# Patient Record
Sex: FEMALE | Race: WHITE | Employment: OTHER | URBAN - METROPOLITAN AREA
[De-identification: names, ages, dates, MRNs, and addresses within clinical notes are randomized per-mention and may not be internally consistent; named-entity substitution may affect disease eponyms.]

---

## 2018-04-09 ENCOUNTER — HOSPITAL ENCOUNTER (OUTPATIENT)
Age: 73
Discharge: HOME | End: 2018-04-11
Payer: MEDICARE

## 2018-04-09 ENCOUNTER — HOSPITAL ENCOUNTER (OUTPATIENT)
Dept: GENERAL RADIOLOGY | Age: 73
Discharge: HOME | End: 2018-04-11
Payer: MEDICARE

## 2018-04-09 DIAGNOSIS — M19.011 OSTEOARTHRITIS OF RIGHT SHOULDER, UNSPECIFIED OSTEOARTHRITIS TYPE: ICD-10-CM

## 2018-04-09 PROCEDURE — 73030 X-RAY EXAM OF SHOULDER: CPT

## 2018-11-14 ENCOUNTER — HOSPITAL ENCOUNTER (OUTPATIENT)
Age: 73
Discharge: HOME | End: 2018-11-16
Payer: MEDICARE

## 2018-11-14 PROCEDURE — 88304 TISSUE EXAM BY PATHOLOGIST: CPT

## 2019-10-31 ENCOUNTER — PREP FOR PROCEDURE (OUTPATIENT)
Dept: SURGERY | Age: 74
End: 2019-10-31

## 2019-10-31 ENCOUNTER — OFFICE VISIT (OUTPATIENT)
Dept: SURGERY | Age: 74
End: 2019-10-31
Payer: MEDICARE

## 2019-10-31 VITALS
OXYGEN SATURATION: 96 % | DIASTOLIC BLOOD PRESSURE: 68 MMHG | TEMPERATURE: 98.7 F | WEIGHT: 117 LBS | RESPIRATION RATE: 18 BRPM | BODY MASS INDEX: 22.97 KG/M2 | HEART RATE: 65 BPM | HEIGHT: 60 IN | SYSTOLIC BLOOD PRESSURE: 135 MMHG

## 2019-10-31 DIAGNOSIS — K59.02 CONSTIPATION DUE TO OUTLET DYSFUNCTION: Primary | ICD-10-CM

## 2019-10-31 PROCEDURE — 99203 OFFICE O/P NEW LOW 30 MIN: CPT | Performed by: SURGERY

## 2019-10-31 RX ORDER — SODIUM CHLORIDE 0.9 % (FLUSH) 0.9 %
10 SYRINGE (ML) INJECTION PRN
Status: CANCELLED | OUTPATIENT
Start: 2019-10-31

## 2019-10-31 RX ORDER — SODIUM CHLORIDE 0.9 % (FLUSH) 0.9 %
10 SYRINGE (ML) INJECTION EVERY 12 HOURS SCHEDULED
Status: CANCELLED | OUTPATIENT
Start: 2019-10-31

## 2019-10-31 RX ORDER — SODIUM CHLORIDE 9 MG/ML
INJECTION, SOLUTION INTRAVENOUS CONTINUOUS
Status: CANCELLED | OUTPATIENT
Start: 2019-10-31

## 2019-11-13 ENCOUNTER — HOSPITAL ENCOUNTER (OUTPATIENT)
Age: 74
Discharge: HOME | End: 2019-11-15

## 2019-11-13 PROCEDURE — 88305 TISSUE EXAM BY PATHOLOGIST: CPT

## 2019-11-27 RX ORDER — ACETAMINOPHEN 325 MG/1
650 TABLET ORAL EVERY 6 HOURS PRN
COMMUNITY

## 2019-12-04 ENCOUNTER — ANESTHESIA EVENT (OUTPATIENT)
Dept: ENDOSCOPY | Age: 74
End: 2019-12-04
Payer: MEDICARE

## 2019-12-04 ENCOUNTER — ANESTHESIA (OUTPATIENT)
Dept: ENDOSCOPY | Age: 74
End: 2019-12-04
Payer: MEDICARE

## 2019-12-04 ENCOUNTER — HOSPITAL ENCOUNTER (OUTPATIENT)
Age: 74
Setting detail: OUTPATIENT SURGERY
Discharge: HOME | End: 2019-12-04
Attending: SURGERY | Admitting: SURGERY
Payer: MEDICARE

## 2019-12-04 VITALS
OXYGEN SATURATION: 100 % | SYSTOLIC BLOOD PRESSURE: 168 MMHG | RESPIRATION RATE: 18 BRPM | DIASTOLIC BLOOD PRESSURE: 77 MMHG

## 2019-12-04 VITALS
RESPIRATION RATE: 16 BRPM | DIASTOLIC BLOOD PRESSURE: 88 MMHG | HEIGHT: 60 IN | WEIGHT: 112 LBS | HEART RATE: 61 BPM | OXYGEN SATURATION: 95 % | TEMPERATURE: 97.2 F | BODY MASS INDEX: 21.99 KG/M2 | SYSTOLIC BLOOD PRESSURE: 160 MMHG

## 2019-12-04 PROCEDURE — 7100000010 HC PHASE II RECOVERY - FIRST 15 MIN: Performed by: SURGERY

## 2019-12-04 PROCEDURE — 2580000003 HC RX 258: Performed by: SURGERY

## 2019-12-04 PROCEDURE — 3700000001 HC ADD 15 MINUTES (ANESTHESIA): Performed by: SURGERY

## 2019-12-04 PROCEDURE — 2500000003 HC RX 250 WO HCPCS: Performed by: NURSE ANESTHETIST, CERTIFIED REGISTERED

## 2019-12-04 PROCEDURE — 88305 TISSUE EXAM BY PATHOLOGIST: CPT

## 2019-12-04 PROCEDURE — 2709999900 HC NON-CHARGEABLE SUPPLY: Performed by: SURGERY

## 2019-12-04 PROCEDURE — 3700000000 HC ANESTHESIA ATTENDED CARE: Performed by: SURGERY

## 2019-12-04 PROCEDURE — 3609010300 HC COLONOSCOPY W/BIOPSY SINGLE/MULTIPLE: Performed by: SURGERY

## 2019-12-04 PROCEDURE — 7100000011 HC PHASE II RECOVERY - ADDTL 15 MIN: Performed by: SURGERY

## 2019-12-04 PROCEDURE — 6360000002 HC RX W HCPCS: Performed by: NURSE ANESTHETIST, CERTIFIED REGISTERED

## 2019-12-04 PROCEDURE — 45380 COLONOSCOPY AND BIOPSY: CPT | Performed by: SURGERY

## 2019-12-04 RX ORDER — PROPOFOL 10 MG/ML
INJECTION, EMULSION INTRAVENOUS PRN
Status: DISCONTINUED | OUTPATIENT
Start: 2019-12-04 | End: 2019-12-04 | Stop reason: SDUPTHER

## 2019-12-04 RX ORDER — LIDOCAINE HYDROCHLORIDE 10 MG/ML
INJECTION, SOLUTION EPIDURAL; INFILTRATION; INTRACAUDAL; PERINEURAL PRN
Status: DISCONTINUED | OUTPATIENT
Start: 2019-12-04 | End: 2019-12-04 | Stop reason: SDUPTHER

## 2019-12-04 RX ORDER — SODIUM CHLORIDE 9 MG/ML
INJECTION, SOLUTION INTRAVENOUS CONTINUOUS
Status: DISCONTINUED | OUTPATIENT
Start: 2019-12-04 | End: 2019-12-04 | Stop reason: HOSPADM

## 2019-12-04 RX ORDER — SODIUM CHLORIDE 0.9 % (FLUSH) 0.9 %
10 SYRINGE (ML) INJECTION EVERY 12 HOURS SCHEDULED
Status: DISCONTINUED | OUTPATIENT
Start: 2019-12-04 | End: 2019-12-04 | Stop reason: HOSPADM

## 2019-12-04 RX ORDER — SODIUM CHLORIDE 0.9 % (FLUSH) 0.9 %
10 SYRINGE (ML) INJECTION PRN
Status: DISCONTINUED | OUTPATIENT
Start: 2019-12-04 | End: 2019-12-04 | Stop reason: HOSPADM

## 2019-12-04 RX ADMIN — PROPOFOL 30 MG: 10 INJECTION, EMULSION INTRAVENOUS at 09:14

## 2019-12-04 RX ADMIN — PROPOFOL 50 MG: 10 INJECTION, EMULSION INTRAVENOUS at 09:35

## 2019-12-04 RX ADMIN — PROPOFOL 100 MG: 10 INJECTION, EMULSION INTRAVENOUS at 09:12

## 2019-12-04 RX ADMIN — LIDOCAINE HYDROCHLORIDE 20 MG: 10 INJECTION, SOLUTION EPIDURAL; INFILTRATION; INTRACAUDAL; PERINEURAL at 09:12

## 2019-12-04 RX ADMIN — PROPOFOL 50 MG: 10 INJECTION, EMULSION INTRAVENOUS at 09:23

## 2019-12-04 RX ADMIN — PROPOFOL 20 MG: 10 INJECTION, EMULSION INTRAVENOUS at 09:21

## 2019-12-04 RX ADMIN — SODIUM CHLORIDE: 9 INJECTION, SOLUTION INTRAVENOUS at 08:26

## 2019-12-04 RX ADMIN — PROPOFOL 20 MG: 10 INJECTION, EMULSION INTRAVENOUS at 09:16

## 2019-12-04 ASSESSMENT — PAIN SCALES - GENERAL
PAINLEVEL_OUTOF10: 0

## 2019-12-04 ASSESSMENT — PAIN DESCRIPTION - LOCATION
LOCATION: ABDOMEN

## 2019-12-04 ASSESSMENT — PAIN - FUNCTIONAL ASSESSMENT: PAIN_FUNCTIONAL_ASSESSMENT: 0-10

## 2019-12-10 ENCOUNTER — TELEPHONE (OUTPATIENT)
Dept: SURGERY | Age: 74
End: 2019-12-10

## 2020-02-07 RX ORDER — LANOLIN ALCOHOL/MO/W.PET/CERES
3 CREAM (GRAM) TOPICAL NIGHTLY PRN
COMMUNITY
End: 2021-04-13

## 2020-02-07 RX ORDER — DOCUSATE SODIUM 100 MG/1
100 CAPSULE, LIQUID FILLED ORAL 2 TIMES DAILY PRN
COMMUNITY
End: 2021-10-27 | Stop reason: ALTCHOICE

## 2020-02-10 ENCOUNTER — ANESTHESIA EVENT (OUTPATIENT)
Dept: OPERATING ROOM | Age: 75
End: 2020-02-10
Payer: MEDICARE

## 2020-02-10 ENCOUNTER — PREP FOR PROCEDURE (OUTPATIENT)
Dept: SURGERY | Age: 75
End: 2020-02-10

## 2020-02-10 RX ORDER — SODIUM CHLORIDE 0.9 % (FLUSH) 0.9 %
10 SYRINGE (ML) INJECTION EVERY 12 HOURS SCHEDULED
Status: CANCELLED | OUTPATIENT
Start: 2020-02-10

## 2020-02-10 RX ORDER — SODIUM CHLORIDE, SODIUM LACTATE, POTASSIUM CHLORIDE, CALCIUM CHLORIDE 600; 310; 30; 20 MG/100ML; MG/100ML; MG/100ML; MG/100ML
INJECTION, SOLUTION INTRAVENOUS CONTINUOUS
Status: CANCELLED | OUTPATIENT
Start: 2020-02-10

## 2020-02-11 ENCOUNTER — ANESTHESIA (OUTPATIENT)
Dept: OPERATING ROOM | Age: 75
End: 2020-02-11
Payer: MEDICARE

## 2020-02-11 ENCOUNTER — HOSPITAL ENCOUNTER (OUTPATIENT)
Dept: GENERAL RADIOLOGY | Age: 75
Setting detail: OUTPATIENT SURGERY
Discharge: HOME | End: 2020-02-13
Attending: SURGERY
Payer: MEDICARE

## 2020-02-11 ENCOUNTER — APPOINTMENT (OUTPATIENT)
Dept: GENERAL RADIOLOGY | Age: 75
End: 2020-02-11
Attending: SURGERY
Payer: MEDICARE

## 2020-02-11 ENCOUNTER — HOSPITAL ENCOUNTER (OUTPATIENT)
Age: 75
Setting detail: OUTPATIENT SURGERY
Discharge: HOME | End: 2020-02-11
Attending: SURGERY | Admitting: SURGERY
Payer: MEDICARE

## 2020-02-11 VITALS
RESPIRATION RATE: 16 BRPM | HEART RATE: 70 BPM | OXYGEN SATURATION: 95 % | DIASTOLIC BLOOD PRESSURE: 61 MMHG | WEIGHT: 113 LBS | BODY MASS INDEX: 22.78 KG/M2 | HEIGHT: 59 IN | TEMPERATURE: 96.6 F | SYSTOLIC BLOOD PRESSURE: 134 MMHG

## 2020-02-11 VITALS — DIASTOLIC BLOOD PRESSURE: 52 MMHG | OXYGEN SATURATION: 98 % | SYSTOLIC BLOOD PRESSURE: 111 MMHG

## 2020-02-11 PROCEDURE — 6360000002 HC RX W HCPCS: Performed by: SURGERY

## 2020-02-11 PROCEDURE — 71045 X-RAY EXAM CHEST 1 VIEW: CPT

## 2020-02-11 PROCEDURE — 7100000010 HC PHASE II RECOVERY - FIRST 15 MIN: Performed by: SURGERY

## 2020-02-11 PROCEDURE — 6360000002 HC RX W HCPCS

## 2020-02-11 PROCEDURE — 2709999900 HC NON-CHARGEABLE SUPPLY: Performed by: SURGERY

## 2020-02-11 PROCEDURE — 3700000000 HC ANESTHESIA ATTENDED CARE: Performed by: SURGERY

## 2020-02-11 PROCEDURE — 3600000002 HC SURGERY LEVEL 2 BASE: Performed by: SURGERY

## 2020-02-11 PROCEDURE — 2580000003 HC RX 258: Performed by: SURGERY

## 2020-02-11 PROCEDURE — 3600000012 HC SURGERY LEVEL 2 ADDTL 15MIN: Performed by: SURGERY

## 2020-02-11 PROCEDURE — C1788 PORT, INDWELLING, IMP: HCPCS | Performed by: SURGERY

## 2020-02-11 PROCEDURE — 3700000001 HC ADD 15 MINUTES (ANESTHESIA): Performed by: SURGERY

## 2020-02-11 PROCEDURE — 7100000011 HC PHASE II RECOVERY - ADDTL 15 MIN: Performed by: SURGERY

## 2020-02-11 PROCEDURE — 3209999900 FLUORO FOR SURGICAL PROCEDURES

## 2020-02-11 PROCEDURE — 2500000003 HC RX 250 WO HCPCS: Performed by: SURGERY

## 2020-02-11 DEVICE — PORT INFUS 8FR PWR INJ CT FOR VASC ACCS CATH: Type: IMPLANTABLE DEVICE | Site: CHEST | Status: FUNCTIONAL

## 2020-02-11 RX ORDER — PROPOFOL 10 MG/ML
INJECTION, EMULSION INTRAVENOUS CONTINUOUS PRN
Status: DISCONTINUED | OUTPATIENT
Start: 2020-02-11 | End: 2020-02-11 | Stop reason: SDUPTHER

## 2020-02-11 RX ORDER — SODIUM CHLORIDE 9 MG/ML
INJECTION INTRAVENOUS PRN
Status: DISCONTINUED | OUTPATIENT
Start: 2020-02-11 | End: 2020-02-11 | Stop reason: ALTCHOICE

## 2020-02-11 RX ORDER — CEFAZOLIN SODIUM 2 G/50ML
2 SOLUTION INTRAVENOUS
Status: COMPLETED | OUTPATIENT
Start: 2020-02-11 | End: 2020-02-11

## 2020-02-11 RX ORDER — FENTANYL CITRATE 50 UG/ML
50 INJECTION, SOLUTION INTRAMUSCULAR; INTRAVENOUS EVERY 5 MIN PRN
Status: DISCONTINUED | OUTPATIENT
Start: 2020-02-11 | End: 2020-02-11 | Stop reason: HOSPADM

## 2020-02-11 RX ORDER — OXYCODONE HYDROCHLORIDE AND ACETAMINOPHEN 5; 325 MG/1; MG/1
1 TABLET ORAL
Status: DISCONTINUED | OUTPATIENT
Start: 2020-02-11 | End: 2020-02-11 | Stop reason: HOSPADM

## 2020-02-11 RX ORDER — FENTANYL CITRATE 50 UG/ML
INJECTION, SOLUTION INTRAMUSCULAR; INTRAVENOUS PRN
Status: DISCONTINUED | OUTPATIENT
Start: 2020-02-11 | End: 2020-02-11 | Stop reason: SDUPTHER

## 2020-02-11 RX ORDER — FENTANYL CITRATE 50 UG/ML
25 INJECTION, SOLUTION INTRAMUSCULAR; INTRAVENOUS EVERY 5 MIN PRN
Status: DISCONTINUED | OUTPATIENT
Start: 2020-02-11 | End: 2020-02-11 | Stop reason: HOSPADM

## 2020-02-11 RX ORDER — MEPERIDINE HYDROCHLORIDE 25 MG/ML
12.5 INJECTION INTRAMUSCULAR; INTRAVENOUS; SUBCUTANEOUS EVERY 5 MIN PRN
Status: DISCONTINUED | OUTPATIENT
Start: 2020-02-11 | End: 2020-02-11 | Stop reason: HOSPADM

## 2020-02-11 RX ORDER — HYDROCODONE BITARTRATE AND ACETAMINOPHEN 5; 325 MG/1; MG/1
1 TABLET ORAL EVERY 4 HOURS PRN
Qty: 18 TABLET | Refills: 0 | Status: SHIPPED | OUTPATIENT
Start: 2020-02-11 | End: 2020-02-14

## 2020-02-11 RX ORDER — DIPHENHYDRAMINE HYDROCHLORIDE 50 MG/ML
12.5 INJECTION INTRAMUSCULAR; INTRAVENOUS
Status: DISCONTINUED | OUTPATIENT
Start: 2020-02-11 | End: 2020-02-11 | Stop reason: HOSPADM

## 2020-02-11 RX ORDER — ONDANSETRON 2 MG/ML
INJECTION INTRAMUSCULAR; INTRAVENOUS PRN
Status: DISCONTINUED | OUTPATIENT
Start: 2020-02-11 | End: 2020-02-11 | Stop reason: SDUPTHER

## 2020-02-11 RX ORDER — MIDAZOLAM HYDROCHLORIDE 1 MG/ML
INJECTION INTRAMUSCULAR; INTRAVENOUS PRN
Status: DISCONTINUED | OUTPATIENT
Start: 2020-02-11 | End: 2020-02-11 | Stop reason: SDUPTHER

## 2020-02-11 RX ORDER — HEPARIN SODIUM (PORCINE) LOCK FLUSH IV SOLN 100 UNIT/ML 100 UNIT/ML
SOLUTION INTRAVENOUS PRN
Status: DISCONTINUED | OUTPATIENT
Start: 2020-02-11 | End: 2020-02-11 | Stop reason: ALTCHOICE

## 2020-02-11 RX ORDER — SODIUM CHLORIDE 0.9 % (FLUSH) 0.9 %
10 SYRINGE (ML) INJECTION EVERY 12 HOURS SCHEDULED
Status: DISCONTINUED | OUTPATIENT
Start: 2020-02-11 | End: 2020-02-11 | Stop reason: HOSPADM

## 2020-02-11 RX ORDER — LIDOCAINE HYDROCHLORIDE 10 MG/ML
INJECTION, SOLUTION INFILTRATION; PERINEURAL PRN
Status: DISCONTINUED | OUTPATIENT
Start: 2020-02-11 | End: 2020-02-11 | Stop reason: ALTCHOICE

## 2020-02-11 RX ORDER — PROMETHAZINE HYDROCHLORIDE 25 MG/ML
6.25 INJECTION, SOLUTION INTRAMUSCULAR; INTRAVENOUS
Status: DISCONTINUED | OUTPATIENT
Start: 2020-02-11 | End: 2020-02-11 | Stop reason: HOSPADM

## 2020-02-11 RX ORDER — SODIUM CHLORIDE, SODIUM LACTATE, POTASSIUM CHLORIDE, CALCIUM CHLORIDE 600; 310; 30; 20 MG/100ML; MG/100ML; MG/100ML; MG/100ML
INJECTION, SOLUTION INTRAVENOUS CONTINUOUS
Status: DISCONTINUED | OUTPATIENT
Start: 2020-02-11 | End: 2020-02-11 | Stop reason: HOSPADM

## 2020-02-11 RX ADMIN — PROPOFOL 75 MCG/KG/MIN: 10 INJECTION, EMULSION INTRAVENOUS at 10:22

## 2020-02-11 RX ADMIN — FENTANYL CITRATE 50 MCG: 50 INJECTION, SOLUTION INTRAMUSCULAR; INTRAVENOUS at 10:19

## 2020-02-11 RX ADMIN — FENTANYL CITRATE 25 MCG: 50 INJECTION, SOLUTION INTRAMUSCULAR; INTRAVENOUS at 10:36

## 2020-02-11 RX ADMIN — FENTANYL CITRATE 25 MCG: 50 INJECTION, SOLUTION INTRAMUSCULAR; INTRAVENOUS at 10:30

## 2020-02-11 RX ADMIN — CEFAZOLIN SODIUM 2 G: 2 SOLUTION INTRAVENOUS at 10:17

## 2020-02-11 RX ADMIN — ONDANSETRON HYDROCHLORIDE 4 MG: 2 INJECTION, SOLUTION INTRAMUSCULAR; INTRAVENOUS at 10:40

## 2020-02-11 RX ADMIN — MIDAZOLAM 2 MG: 1 INJECTION INTRAMUSCULAR; INTRAVENOUS at 10:17

## 2020-02-11 RX ADMIN — PROPOFOL 50 MCG/KG/MIN: 10 INJECTION, EMULSION INTRAVENOUS at 10:19

## 2020-02-11 RX ADMIN — SODIUM CHLORIDE, POTASSIUM CHLORIDE, SODIUM LACTATE AND CALCIUM CHLORIDE: 600; 310; 30; 20 INJECTION, SOLUTION INTRAVENOUS at 10:18

## 2020-02-11 ASSESSMENT — PAIN SCALES - GENERAL
PAINLEVEL_OUTOF10: 0

## 2020-02-11 NOTE — PROGRESS NOTES
Explained discharge paperwork to patient and family members. All verbalized that they understood. Took out patient IV before being discharged.
registration    [x] You can expect a call the business day prior to procedure to notify you if your arrival time changes    [x] If you receive a survey after surgery we would greatly appreciate your comments    [] Parent/guardian of a minor must accompany their child and remain on the premises  the entire time they are under our care     [] Pediatric patients may bring favorite toy, blanket or comfort item with them    [] A caregiver or family member must remain with the patient during their stay if they are mentally handicapped, have dementia, disoriented or unable to use a call light or would be a safety concern if left unattended    [x] Please notify surgeon if you develop any illness between now and time of surgery (cold, cough, sore throat, fever, nausea, vomiting) or any signs of infections  including skin, wounds, and dental.    [x]  The Outpatient Pharmacy is available to fill your prescription here on your day of surgery, ask your preop nurse for details    [x] Other instructions: Wear comfortable clothing    EDUCATIONAL MATERIALS PROVIDED:    [] PAT Preoperative Education Packet/Booklet     [] Medication List    [] Fluoroscopy Information Pamphlet    [] Transfusion bracelet applied with instructions    [] Joint replacement video reviewed    [] Shower with soap, lather and rinse well, and use CHG wipes provided the evening before surgery as instructed

## 2020-02-11 NOTE — H&P
History and Physical    Patient's Name/Date of Birth: Janeen Solano / [birth year], 76 y.o. yo    Date: February 11, 2020     PCP: Jerald Valdez DO     Chief Complaint: endometrial cancer      History of Present Illness: 76year old female, right hand dominant. Recent surgery for serous carcinoma of uterus. Due to start chemotherapy and radiation. Past Medical History:   Diagnosis Date    Babesiosis 2009    tick disease; treated    Endometrial cancer ([address] 11/21 2019 approx    new dx    Hyperlipidemia     Sleep apnea       Past Surgical History:   Procedure Laterality Date    BUNIONECTOMY  2006 2007    bilateral    CARPAL TUNNEL RELEASE Bilateral     COLONOSCOPY      COLONOSCOPY N/A 12/4/2019    COLONOSCOPY WITH [facility]OPSY [facility]  2010    right hand    FINGER TRIGGER RELEASE Left 12/18/2018    left ring finger.  [facility]    OTHER SURGICAL HISTORY Right 7/3/15    excision mass right upper arm    TONSILLECTOMY  1947    TUBAL LIGATION  1980      Family History   Problem Relation Age of Onset    Other Mother     Other Father      Allergies: Azmacort [triamcinolone];  Doxycycline; Mepron [atovaquone]; and Zithromax [azithromycin]     Current Facility-Administered Medications   Medication Dose Route Frequency Provider Last Rate Last Dose    ceFAZolin (ANCEF) 2 g in dextrose 3 % 50 mL IVPB (duplex)  2 g Intravenous On Call to [address] [place] [address] MD Ivonne        lactated ringers infusion   Intravenous Continuous Theodor MD Mark        sodium chloride flush 0.9 % injection 10 mL  10 mL Intravenous 2 times per day Theodor MD Mark        fentaNYL (SUBLIMAZE) injection 25 mcg  25 mcg Intravenous Q5 Min PRN Sami Yanez MD        fentaNYL (SUBLIMAZE) injection 50 mcg  50 mcg Intravenous Q5 Min PRN Sami Yanez MD        HYDROmorphone (DILAUDID) injection 0.25 mg  0.25 mg Intravenous Q5 Min PRN MD Soy Bonilla HYDROmorphone (DILAUDID) injection 0.5 mg  0.5 mg Intravenous Q5 Min PRN Sami Yanez MD        oxyCODONE-acetaminophen (PERCOCET) 5-325 MG per tablet 1 tablet  1 tablet Oral Once PRN Matteo Pearce MD        diphenhydrAMINE (BENADRYL) injection 12.5 mg  12.5 mg Intravenous Once PRN Sami Yanez MD        promethazine (PHENERGAN) injection 6.25 mg  6.25 mg Intravenous Q15 Min PRN Sami Yanez MD        meperidine (DEMEROL) injection 12.5 mg  12.5 mg Intravenous Q5 Min PRN Matteo Pearce MD           Social History     Tobacco Use    Smoking status: Never Smoker    Smokeless tobacco: Never Used   Substance Use Topics    Alcohol use: Yes     Alcohol/week: 0.0 standard drinks     Comment: rarely        Review of Systems:    Other than stated above in the HPI is negative      Physical exam:     Patient Vitals for the past 24 hrs:   BP Temp Temp src Pulse Resp SpO2 Height Weight   02/11/20 0758 (!) 171/75 98.5 °F (36.9 °C) Temporal 67 18 98 % 4' 11\" (1.499 m) 113 lb (51.3 kg)       General appearance: no acute distress  Head: NCAT, PERRLA, EOMI  Neck: supple, no masses  Lungs: CTABL  Heart: RRR  Abdomen: soft, nondistended, nontender, no guarding or peritoneal signs. Extremities: no rash cyanosis edema or jaundice    Labs:    No results for input(s): WBC, HGB, HCT, PLT in the last 72 hours. No results for input(s): CREATININE, BUN, NA, K, CL, CO2 in the last 72 hours. No results for input(s): AST, ALT, ALB, BILITOT, ALKPHOS in the last 72 hours. No results for input(s): LIPASE, AMYLASE in the last 72 hours. No results for input(s): LACTATE in the last 72 hours. No results for input(s): INR, PTT in the last 72 hours. Invalid input(s): PT    Films:  No results found.         Assessment:  Endometrial cancer      Plan:  Port placement        Ozzy Baumann MD 2/11/2020 at 9:42 AM

## 2020-02-11 NOTE — ANESTHESIA PRE PROCEDURE
Department of Anesthesiology  Preprocedure Note       Name:  Antonietta Hammond   Age:  76 y.o.  :  1945                                          MRN:  [id number]         Date:  2020      Surgeon: Alfredo Sparks):  Julia Olivo MD    Procedure: INSERTION OF A POWER PORT (N/A Chest)    Medications prior to admission:   Prior to Admission medications    Medication Sig Start Date End Date Taking? Authorizing Provider   melatonin 3 MG TABS tablet Take 3 mg by mouth nightly as needed Last dose 20   Yes Historical Provider, MD   docusate sodium (COLACE) 100 MG capsule Take 100 mg by mouth 2 times daily as needed for Constipation   Yes Historical Provider, MD   acetaminophen (TYLENOL) 325 MG tablet Take 650 mg by mouth every 6 hours as needed for Pain   Yes Historical Provider, MD   vitamin D-3 (CHOLECALCIFEROL) 5000 UNITS TABS Take 5,000 Units by mouth daily Last dose 20   Yes Historical Provider, MD   Magnesium 500 MG CAPS Take by mouth every evening Last dose 20   Yes Historical Provider, MD   Cetirizine HCl (ZYRTEC PO) Take 1 tablet by mouth daily    Yes Historical Provider, MD   aspirin 325 MG tablet Take 325 mg by mouth daily Per PCP; last dose 20   Yes Historical Provider, MD       Current medications:    Current Facility-Administered Medications   Medication Dose Route Frequency Provider Last Rate Last Dose    ceFAZolin (ANCEF) 2 g in dextrose 3 % 50 mL IVPB (duplex)  2 g Intravenous On Call to Malik Washington MD        lactated ringers infusion   Intravenous Continuous Julia Olivo MD        sodium chloride flush 0.9 % injection 10 mL  10 mL Intravenous 2 times per day Julia Olivo MD           Allergies:     Allergies   Allergen Reactions    Azmacort [Triamcinolone] Swelling    Doxycycline Rash    Mepron [Atovaquone] Rash    Zithromax [Azithromycin] Rash       Problem List:    Patient Active Problem List   Diagnosis Code    GENNY on CPAP G47.33, Z99.89       Past Medical History: Diagnosis Date    Babesiosis 2009    tick disease; treated    Endometrial cancer ([facility] [address] 11/21 2019 approx    new dx    Hyperlipidemia     Sleep apnea        Past Surgical History:        Procedure Laterality Date    BUNIONECTOMY  2006 2007    bilateral    CARPAL TUNNEL RELEASE Bilateral     COLONOSCOPY      COLONOSCOPY N/A 12/4/2019    COLONOSCOPY WITH BIOPSY performed by Leandro Domingo MD at [facility]  2010    right hand    FINGER TRIGGER RELEASE Left 12/18/2018    left ring finger.  [facility]  [facility]17/2019    [facility]    OTHER SURGICAL HISTORY Right 7/3/15    excision mass right upper arm    TONSILLECTOMY  1947    TUBAL LIGATION  1980       Social History:    Social History     Tobacco Use    Smoking status: Never Smoker    Smokeless tobacco: Never Used   Substance Use Topics    Alcohol use: Yes     Alcohol/week: 0.0 standard drinks     Comment: rarely                                Counseling given: Not Answered      Vital Signs (Current):   Vitals:    02/07/20 1244 02/11/20 0758   BP:  (!) 171/75   Pulse:  67   Resp:  18   Temp:  98.5 °F (36.9 °C)   TempSrc:  Temporal   SpO2:  98%   Weight: 113 lb (51.3 kg) 113 lb (51.3 kg)   Height: 4' 11\" (1.499 m) 4' 11\" (1.499 m)                                              BP Readings from Last 3 Encounters:   02/11/20 (!) 171/75   12/04/19 (!) 160/88   12/04/19 (!) 168/77       NPO Status: Time of last liquid consumption: 2100                        Time of last solid consumption: 2300                        Date of last liquid consumption: 02/10/20                        Date of last solid food consumption: 02/10/20    BMI:   Wt Readings from Last 3 Encounters:   02/11/20 113 lb (51.3 kg)   11/27/19 112 lb (50.8 kg)   10/31/19 117 lb (53.1 kg)     Body mass index is 22.82 kg/m².     CBC:   Lab Results   Component Value Date    WBC 5.6 06/20/2013    RBC 3.90 06/20/2013    HGB 12.3 06/20/2013    HCT 36.9 06/20/2013    MCV 94.6

## 2020-02-11 NOTE — ANESTHESIA POSTPROCEDURE EVALUATION
Department of Anesthesiology  Postprocedure Note    Patient: Jere Barnes  MRN: [id number]  YOB: 1945  Date of evaluation: 2/11/2020  Time:  6:42 PM     Procedure Summary     Date:  02/11/20 Room / Location:  [address]    Anesthesia Start:  1018 Anesthesia Stop:  3739    Procedure:  INSERTION OF A POWER PORT (N/A Chest) Diagnosis:  (ENDOMETRIAL CANCER)    Surgeon:  General Neha MD Responsible Provider:  Waldo Coffey MD    Anesthesia Type:  MAC ASA Status:  3          Anesthesia Type: MAC    Orlin Phase I: Orlin Score: 10    Orlin Phase II: Orlin Score: 10    Last vitals: Reviewed and per EMR flowsheets.        Anesthesia Post Evaluation    Patient location during evaluation: PACU  Patient participation: complete - patient participated  Level of consciousness: awake  Airway patency: patent  Nausea & Vomiting: no vomiting and no nausea  Complications: no  Cardiovascular status: hemodynamically stable  Respiratory status: acceptable  Hydration status: stable

## 2020-10-26 ENCOUNTER — HOSPITAL ENCOUNTER (EMERGENCY)
Age: 75
Discharge: HOME | End: 2020-10-26
Attending: EMERGENCY MEDICINE
Payer: MEDICARE

## 2020-10-26 ENCOUNTER — APPOINTMENT (OUTPATIENT)
Dept: GENERAL RADIOLOGY | Age: 75
End: 2020-10-26
Payer: MEDICARE

## 2020-10-26 ENCOUNTER — APPOINTMENT (OUTPATIENT)
Dept: ULTRASOUND IMAGING | Age: 75
End: 2020-10-26
Payer: MEDICARE

## 2020-10-26 VITALS
TEMPERATURE: 98.1 F | SYSTOLIC BLOOD PRESSURE: 184 MMHG | WEIGHT: 115 LBS | BODY MASS INDEX: 22.58 KG/M2 | HEIGHT: 60 IN | OXYGEN SATURATION: 98 % | DIASTOLIC BLOOD PRESSURE: 81 MMHG | RESPIRATION RATE: 18 BRPM | HEART RATE: 70 BPM

## 2020-10-26 PROCEDURE — 99284 EMERGENCY DEPT VISIT MOD MDM: CPT

## 2020-10-26 PROCEDURE — 73564 X-RAY EXAM KNEE 4 OR MORE: CPT

## 2020-10-26 PROCEDURE — 6370000000 HC RX 637 (ALT 250 FOR IP): Performed by: EMERGENCY MEDICINE

## 2020-10-26 PROCEDURE — 93971 EXTREMITY STUDY: CPT

## 2020-10-26 RX ORDER — NAPROXEN 500 MG/1
500 TABLET ORAL 2 TIMES DAILY
Qty: 14 TABLET | Refills: 0 | Status: SHIPPED | OUTPATIENT
Start: 2020-10-26 | End: 2021-10-27 | Stop reason: ALTCHOICE

## 2020-10-26 RX ORDER — HYDROCODONE BITARTRATE AND ACETAMINOPHEN 5; 325 MG/1; MG/1
1 TABLET ORAL EVERY 6 HOURS PRN
Qty: 6 TABLET | Refills: 0 | Status: SHIPPED | OUTPATIENT
Start: 2020-10-26 | End: 2020-10-29

## 2020-10-26 RX ORDER — NAPROXEN 500 MG/1
500 TABLET ORAL ONCE
Status: COMPLETED | OUTPATIENT
Start: 2020-10-26 | End: 2020-10-26

## 2020-10-26 RX ADMIN — NAPROXEN 500 MG: 500 TABLET ORAL at 08:25

## 2020-10-26 ASSESSMENT — PAIN DESCRIPTION - ORIENTATION: ORIENTATION: RIGHT

## 2020-10-26 ASSESSMENT — PAIN DESCRIPTION - PAIN TYPE: TYPE: ACUTE PAIN

## 2020-10-26 ASSESSMENT — PAIN SCALES - GENERAL
PAINLEVEL_OUTOF10: 3
PAINLEVEL_OUTOF10: 1

## 2020-10-26 ASSESSMENT — PAIN DESCRIPTION - LOCATION: LOCATION: KNEE

## 2020-10-26 ASSESSMENT — PAIN DESCRIPTION - DESCRIPTORS: DESCRIPTORS: DISCOMFORT

## 2020-10-26 NOTE — ED PROVIDER NOTES
HPI:  10/26/20,   Time: 8:23 AM EDT         Janny Pool is a 76 y.o. female presenting to the ED for pain behind her right knee, beginning more than 1 day ago. The complaint has been persistent, moderate in severity, and worsened by nothing. Patient has history of Baker's cyst.  Experiencing more pain today. States the pain radiates up into her thigh. There is no history of injury or fall. She does state that she has been doing a lot of climbing and has been on ladders. ROS:   Pertinent positives and negatives are stated within HPI, all other systems reviewed and are negative.  --------------------------------------------- PAST HISTORY ---------------------------------------------  Past Medical History:  has a past medical history of Babesiosis, Endomet[address] [address] Hyperlipidemia, and Sleep apnea. Past Surgical History:  has a past surgical history that includes Tubal ligation (1980); Tonsillectomy (1947); Bunionectomy (2006 2007); cyst removal (2010); Colonoscopy; other surgical history (Right, 7/3/15); Finger trigger release (Left, 12/18/2018); Colonoscopy (N/A, 12/4/2019); Carpal tunnel release (Bilateral); Hysterectomy (12/17/2019); and Port Surgery (N/A, 2/11/2020). Social History:  reports that she has never smoked. She has never used smokeless tobacco. She reports current alcohol use. She reports that she does not use drugs. Family History: family history includes Other in her father and mother. The patients home medications have been reviewed. Allergies: Azmacort [triamcinolone]; Doxycycline; Mepron [atovaquone]; and Zithromax [azithromycin]    -------------------------------------------------- RESULTS -------------------------------------------------  All laboratory and radiology results have been personally reviewed by myself   LABS:  No results found for this visit on 10/26/20.     RADIOLOGY:  Interpreted by Radiologist.  US DUP LOWER EXTREMITY RIGHT NEVA   Final Result No evidence of DVT in the right lower extremity. Popliteal fossa cyst is likely a Baker's cyst.      Fluid is seen anteriorly at the level of the knee, which could be a joint   effusion or related to bursal fluid. XR KNEE RIGHT (MIN 4 VIEWS)   Final Result   No fracture or joint dislocation.             ------------------------- NURSING NOTES AND VITALS REVIEWED ---------------------------   The nursing notes within the ED encounter and vital signs as below have been reviewed. BP (!) 189/68   Pulse 75   Temp 98.1 °F (36.7 °C) (Oral)   Resp 18   Ht 5' (1.524 m)   Wt 115 lb (52.2 kg)   SpO2 98%   BMI 22.46 kg/m²   Oxygen Saturation Interpretation: Normal      ---------------------------------------------------PHYSICAL EXAM------------------------------------  Constitutional/General: Alert and oriented x3, well appearing, non toxic in NAD  Head: NC/AT  Eyes: PERRL, EOMI  Mouth: Oropharynx clear, handling secretions, no trismus  Neck: Supple, full ROM, no meningeal signs  Pulmonary: Lungs clear to auscultation bilaterally, no wheezes, rales, or rhonchi. Not in respiratory distress  Cardiovascular:  Regular rate and rhythm, no murmurs, gallops, or rubs. 2+ distal pulses  Abdomen: Soft, non tender, non distended,   Extremities: Moves all extremities x 4. Warm and well perfused; patient has a palpable what appears to be a cyst behind her right knee approximately 3 cm's firm the right lower extremity does not appear swollen there is good color warmth. There is good capillary refill. There is no bony deformity.   Knee examination finds the knee slightly swollen there is no redness there is no instability drawer sign is negative  Skin: warm and dry without rash  Neurologic: GCS 15,  Psych: Normal Affect      ------------------------------ ED COURSE/MEDICAL DECISION MAKING----------------------  Medications   naproxen (NAPROSYN) tablet 500 mg (500 mg Oral Given 10/26/20 0626)         Medical Decision Making: We will check knee x-ray and will do ultrasound of right lower extremity to rule out DVT  X-ray shows no fracture; ultrasound is negative for DVT but does show a rather large Baker's cyst noted. Patient is advised to follow-up with orthopedics and that this may need to be drained. She will be discharged on anti-inflammatories with few pain pills     Counseling: The emergency provider has spoken with the patient and discussed todays results, in addition to providing specific details for the plan of care and counseling regarding the diagnosis and prognosis. Questions are answered at this time and they are agreeable with the plan.      --------------------------------- IMPRESSION AND DISPOSITION ---------------------------------    IMPRESSION  1. Chronic pain of right knee    2.  Baker's cyst of knee, right        DISPOSITION  Disposition: Discharge to home  Patient condition is fair                 Sohail Daniels MD  10/26/20 0003

## 2021-04-13 ENCOUNTER — OFFICE VISIT (OUTPATIENT)
Dept: FAMILY MEDICINE CLINIC | Age: 76
End: 2021-04-13
Payer: MEDICARE

## 2021-04-13 VITALS
RESPIRATION RATE: 18 BRPM | BODY MASS INDEX: 22.52 KG/M2 | HEART RATE: 66 BPM | WEIGHT: 114.7 LBS | HEIGHT: 60 IN | TEMPERATURE: 98 F | OXYGEN SATURATION: 100 % | DIASTOLIC BLOOD PRESSURE: 67 MMHG | SYSTOLIC BLOOD PRESSURE: 139 MMHG

## 2021-04-13 DIAGNOSIS — I65.23 CAROTID STENOSIS, ASYMPTOMATIC, BILATERAL: ICD-10-CM

## 2021-04-13 DIAGNOSIS — Z00.00 HEALTH MAINTENANCE EXAMINATION: Primary | ICD-10-CM

## 2021-04-13 DIAGNOSIS — E78.2 MIXED HYPERLIPIDEMIA: ICD-10-CM

## 2021-04-13 PROCEDURE — 99387 INIT PM E/M NEW PAT 65+ YRS: CPT | Performed by: NURSE PRACTITIONER

## 2021-04-13 SDOH — ECONOMIC STABILITY: TRANSPORTATION INSECURITY
IN THE PAST 12 MONTHS, HAS LACK OF TRANSPORTATION KEPT YOU FROM MEETINGS, WORK, OR FROM GETTING THINGS NEEDED FOR DAILY LIVING?: NO

## 2021-04-13 SDOH — ECONOMIC STABILITY: FOOD INSECURITY: WITHIN THE PAST 12 MONTHS, YOU WORRIED THAT YOUR FOOD WOULD RUN OUT BEFORE YOU GOT MONEY TO BUY MORE.: NEVER TRUE

## 2021-04-13 SDOH — ECONOMIC STABILITY: TRANSPORTATION INSECURITY
IN THE PAST 12 MONTHS, HAS THE LACK OF TRANSPORTATION KEPT YOU FROM MEDICAL APPOINTMENTS OR FROM GETTING MEDICATIONS?: NO

## 2021-04-13 ASSESSMENT — ENCOUNTER SYMPTOMS
CHOKING: 0
COLOR CHANGE: 0
CONSTIPATION: 0
TROUBLE SWALLOWING: 0
RECTAL PAIN: 0
RHINORRHEA: 0
EYE ITCHING: 0
PHOTOPHOBIA: 0
NAUSEA: 0
SORE THROAT: 0
ABDOMINAL DISTENTION: 0
APNEA: 0
CHEST TIGHTNESS: 0
STRIDOR: 0
ABDOMINAL PAIN: 0
SINUS PRESSURE: 0
EYE REDNESS: 0
FACIAL SWELLING: 0
COUGH: 0
VOMITING: 0
ANAL BLEEDING: 0
BLOOD IN STOOL: 0
EYE DISCHARGE: 0
DIARRHEA: 0
VOICE CHANGE: 0
EYE PAIN: 0
SHORTNESS OF BREATH: 0
WHEEZING: 0
SINUS PAIN: 0

## 2021-04-13 ASSESSMENT — PATIENT HEALTH QUESTIONNAIRE - PHQ9
SUM OF ALL RESPONSES TO PHQ QUESTIONS 1-9: 0
2. FEELING DOWN, DEPRESSED OR HOPELESS: 0
1. LITTLE INTEREST OR PLEASURE IN DOING THINGS: 0
SUM OF ALL RESPONSES TO PHQ9 QUESTIONS 1 & 2: 0

## 2021-04-13 NOTE — PROGRESS NOTES
hyperlipidemia  Advised to get Lipid Panel drawn in 3 months-see HPI  'Advised on lifestyle modifications  -     Lipid Panel; Future    Carotid stenosis, asymptomatic, bilateral  UA as ordered  -     US CAROTID ARTERY BILATERAL; Future         Call or go to ED immediately if symptoms worsen or persist.    Return in about 6 months (around 10/13/2021) for f/u Hyperlipidemia. , or sooner if necessary. Educational materials and/or home exercises printed for patient's review and were included in patient instructions on his/her After Visit Summary and given to patient at the end of visit. Counseled regarding above diagnosis, including possible risks and complications,  especially if left uncontrolled. Counseled regarding the possible side effects, risks, benefits and alternatives to treatment; patient and/or guardian verbalizes understanding, agrees, feels comfortable with and wishes to proceed with above treatment plan. Advised patient to call with any new medication issues, and read all Rx info from pharmacy to assure aware of all possible risks and side effects of medication before taking. Reviewed age and gender appropriate health screening exams and vaccinations. Advised patient regarding importance of keeping up with recommended health maintenance and to schedule as soon as possible if overdue, as this is important in assessing for undiagnosed pathology, especially cancer, as well as protecting against potentially harmful/life threatening disease. Patient and/or guardian verbalizes understanding and agrees with above counseling, assessment and plan. All questions answered.     LINO Rogers - CNP

## 2021-04-21 ENCOUNTER — HOSPITAL ENCOUNTER (OUTPATIENT)
Dept: ULTRASOUND IMAGING | Age: 76
Discharge: HOME | End: 2021-04-23
Payer: MEDICARE

## 2021-04-21 DIAGNOSIS — I65.23 CAROTID STENOSIS, ASYMPTOMATIC, BILATERAL: ICD-10-CM

## 2021-04-21 PROCEDURE — 93880 EXTRACRANIAL BILAT STUDY: CPT

## 2021-04-29 ENCOUNTER — TELEPHONE (OUTPATIENT)
Dept: FAMILY MEDICINE CLINIC | Age: 76
End: 2021-04-29

## 2021-04-29 DIAGNOSIS — Z20.822 EXPOSURE TO 2019 NOVEL CORONAVIRUS: Primary | ICD-10-CM

## 2021-04-29 NOTE — TELEPHONE ENCOUNTER
Pt is asking if you can place an order for a Covid antibody test , she has had no symptoms or don't think she has been exposed she does not want the vaccine so she would like to see if she has the antibodies.  Please advise

## 2021-10-18 ENCOUNTER — OFFICE VISIT (OUTPATIENT)
Dept: FAMILY MEDICINE CLINIC | Age: 76
End: 2021-10-18
Payer: MEDICARE

## 2021-10-18 ENCOUNTER — HOSPITAL ENCOUNTER (OUTPATIENT)
Age: 76
Discharge: HOME | End: 2021-10-18
Payer: MEDICARE

## 2021-10-18 VITALS
BODY MASS INDEX: 22.58 KG/M2 | HEART RATE: 72 BPM | OXYGEN SATURATION: 99 % | SYSTOLIC BLOOD PRESSURE: 139 MMHG | TEMPERATURE: 97.9 F | DIASTOLIC BLOOD PRESSURE: 71 MMHG | HEIGHT: 60 IN | RESPIRATION RATE: 18 BRPM | WEIGHT: 115 LBS

## 2021-10-18 DIAGNOSIS — G47.33 OSA ON CPAP: ICD-10-CM

## 2021-10-18 DIAGNOSIS — R53.82 CHRONIC FATIGUE: ICD-10-CM

## 2021-10-18 DIAGNOSIS — Z23 NEED FOR 23-POLYVALENT PNEUMOCOCCAL POLYSACCHARIDE VACCINE: ICD-10-CM

## 2021-10-18 DIAGNOSIS — R73.09 OTHER ABNORMAL GLUCOSE: ICD-10-CM

## 2021-10-18 DIAGNOSIS — M81.6 LOCALIZED OSTEOPOROSIS (LEQUESNE): ICD-10-CM

## 2021-10-18 DIAGNOSIS — C55 MALIGNANT NEOPLASM OF UTERUS, UNSPECIFIED SITE (HCC): ICD-10-CM

## 2021-10-18 DIAGNOSIS — E78.2 MIXED HYPERLIPIDEMIA: ICD-10-CM

## 2021-10-18 DIAGNOSIS — B86 SCABIES: ICD-10-CM

## 2021-10-18 DIAGNOSIS — Z99.89 OSA ON CPAP: ICD-10-CM

## 2021-10-18 DIAGNOSIS — E78.2 MIXED HYPERLIPIDEMIA: Primary | ICD-10-CM

## 2021-10-18 DIAGNOSIS — I65.23 CAROTID STENOSIS, ASYMPTOMATIC, BILATERAL: ICD-10-CM

## 2021-10-18 LAB
ALBUMIN SERPL-MCNC: 5.3 G/DL (ref 3.5–5.2)
ALP BLD-CCNC: 86 U/L (ref 35–104)
ALT SERPL-CCNC: 21 U/L (ref 0–32)
ANION GAP SERPL CALCULATED.3IONS-SCNC: 9 MMOL/L (ref 7–16)
AST SERPL-CCNC: 29 U/L (ref 0–31)
BASOPHILS ABSOLUTE: 0.03 E9/L (ref 0–0.2)
BASOPHILS RELATIVE PERCENT: 0.6 % (ref 0–2)
BILIRUB SERPL-MCNC: 0.3 MG/DL (ref 0–1.2)
BUN BLDV-MCNC: 9 MG/DL (ref 6–23)
CALCIUM SERPL-MCNC: 9.9 MG/DL (ref 8.6–10.2)
CHLORIDE BLD-SCNC: 101 MMOL/L (ref 98–107)
CHOLESTEROL, TOTAL: 247 MG/DL (ref 0–199)
CO2: 27 MMOL/L (ref 22–29)
CREAT SERPL-MCNC: 0.8 MG/DL (ref 0.5–1)
EOSINOPHILS ABSOLUTE: 0.36 E9/L (ref 0.05–0.5)
EOSINOPHILS RELATIVE PERCENT: 7 % (ref 0–6)
GFR AFRICAN AMERICAN: >60
GFR NON-AFRICAN AMERICAN: >60 ML/MIN/1.73
GLUCOSE BLD-MCNC: 95 MG/DL (ref 74–99)
HBA1C MFR BLD: 5.3 % (ref 4–5.6)
HCT VFR BLD CALC: 34.2 % (ref 34–48)
HDLC SERPL-MCNC: 76 MG/DL
HEMOGLOBIN: 11.7 G/DL (ref 11.5–15.5)
IMMATURE GRANULOCYTES #: 0.01 E9/L
IMMATURE GRANULOCYTES %: 0.2 % (ref 0–5)
LDL CHOLESTEROL CALCULATED: 151 MG/DL (ref 0–99)
LYMPHOCYTES ABSOLUTE: 1.41 E9/L (ref 1.5–4)
LYMPHOCYTES RELATIVE PERCENT: 27.5 % (ref 20–42)
MCH RBC QN AUTO: 31.8 PG (ref 26–35)
MCHC RBC AUTO-ENTMCNC: 34.2 % (ref 32–34.5)
MCV RBC AUTO: 92.9 FL (ref 80–99.9)
MONOCYTES ABSOLUTE: 0.56 E9/L (ref 0.1–0.95)
MONOCYTES RELATIVE PERCENT: 10.9 % (ref 2–12)
NEUTROPHILS ABSOLUTE: 2.76 E9/L (ref 1.8–7.3)
NEUTROPHILS RELATIVE PERCENT: 53.8 % (ref 43–80)
PDW BLD-RTO: 13.1 FL (ref 11.5–15)
PLATELET # BLD: 266 E9/L (ref 130–450)
PMV BLD AUTO: 9.1 FL (ref 7–12)
POTASSIUM SERPL-SCNC: 4.4 MMOL/L (ref 3.5–5)
RBC # BLD: 3.68 E12/L (ref 3.5–5.5)
SODIUM BLD-SCNC: 137 MMOL/L (ref 132–146)
TOTAL PROTEIN: 7.2 G/DL (ref 6.4–8.3)
TRIGL SERPL-MCNC: 101 MG/DL (ref 0–149)
TSH SERPL DL<=0.05 MIU/L-ACNC: 1.27 UIU/ML (ref 0.27–4.2)
VITAMIN B-12: 1052 PG/ML (ref 211–946)
VITAMIN D 25-HYDROXY: 71 NG/ML (ref 30–100)
VLDLC SERPL CALC-MCNC: 20 MG/DL
WBC # BLD: 5.1 E9/L (ref 4.5–11.5)

## 2021-10-18 PROCEDURE — G8420 CALC BMI NORM PARAMETERS: HCPCS | Performed by: NURSE PRACTITIONER

## 2021-10-18 PROCEDURE — G8400 PT W/DXA NO RESULTS DOC: HCPCS | Performed by: NURSE PRACTITIONER

## 2021-10-18 PROCEDURE — 80061 LIPID PANEL: CPT

## 2021-10-18 PROCEDURE — 1090F PRES/ABSN URINE INCON ASSESS: CPT | Performed by: NURSE PRACTITIONER

## 2021-10-18 PROCEDURE — 82607 VITAMIN B-12: CPT

## 2021-10-18 PROCEDURE — 84443 ASSAY THYROID STIM HORMONE: CPT

## 2021-10-18 PROCEDURE — 99214 OFFICE O/P EST MOD 30 MIN: CPT | Performed by: NURSE PRACTITIONER

## 2021-10-18 PROCEDURE — 1036F TOBACCO NON-USER: CPT | Performed by: NURSE PRACTITIONER

## 2021-10-18 PROCEDURE — G8427 DOCREV CUR MEDS BY ELIG CLIN: HCPCS | Performed by: NURSE PRACTITIONER

## 2021-10-18 PROCEDURE — 90732 PPSV23 VACC 2 YRS+ SUBQ/IM: CPT | Performed by: NURSE PRACTITIONER

## 2021-10-18 PROCEDURE — 80053 COMPREHEN METABOLIC PANEL: CPT

## 2021-10-18 PROCEDURE — 83036 HEMOGLOBIN GLYCOSYLATED A1C: CPT

## 2021-10-18 PROCEDURE — 36415 COLL VENOUS BLD VENIPUNCTURE: CPT

## 2021-10-18 PROCEDURE — G0009 ADMIN PNEUMOCOCCAL VACCINE: HCPCS | Performed by: NURSE PRACTITIONER

## 2021-10-18 PROCEDURE — 82306 VITAMIN D 25 HYDROXY: CPT

## 2021-10-18 PROCEDURE — 85025 COMPLETE CBC W/AUTO DIFF WBC: CPT

## 2021-10-18 PROCEDURE — 4040F PNEUMOC VAC/ADMIN/RCVD: CPT | Performed by: NURSE PRACTITIONER

## 2021-10-18 PROCEDURE — 1123F ACP DISCUSS/DSCN MKR DOCD: CPT | Performed by: NURSE PRACTITIONER

## 2021-10-18 PROCEDURE — G8484 FLU IMMUNIZE NO ADMIN: HCPCS | Performed by: NURSE PRACTITIONER

## 2021-10-18 RX ORDER — IVERMECTIN 3 MG/1
TABLET ORAL
Qty: 15 TABLET | Refills: 0 | Status: SHIPPED | OUTPATIENT
Start: 2021-10-18 | End: 2021-10-27 | Stop reason: ALTCHOICE

## 2021-10-18 RX ORDER — MULTIVIT WITH MINERALS/LUTEIN
1000 TABLET ORAL DAILY
COMMUNITY

## 2021-10-18 ASSESSMENT — ENCOUNTER SYMPTOMS
ABDOMINAL PAIN: 0
FACIAL SWELLING: 0
VOMITING: 0
BLOOD IN STOOL: 0
EYE ITCHING: 0
APNEA: 0
SORE THROAT: 0
WHEEZING: 0
STRIDOR: 0
CHEST TIGHTNESS: 0
COUGH: 0
SINUS PRESSURE: 0
ABDOMINAL DISTENTION: 0
EYE DISCHARGE: 0
PHOTOPHOBIA: 0
COLOR CHANGE: 0
SHORTNESS OF BREATH: 0
CHOKING: 0
ANAL BLEEDING: 0
NAUSEA: 0
DIARRHEA: 0
RECTAL PAIN: 0
EYE PAIN: 0
TROUBLE SWALLOWING: 0
EYE REDNESS: 0
CONSTIPATION: 0
VOICE CHANGE: 0
RHINORRHEA: 0
SINUS PAIN: 0

## 2021-10-18 NOTE — PROGRESS NOTES
Jackie Chang is a 68 y.o. female who presents today for   Chief Complaint   Patient presents with    Hyperlipidemia    Sleep Apnea    Rash    Fatigue     chronic    Immunizations     Pneumococcal 23         HPI    Hyperlipidemia:  Patient is  following a low fat, low cholesterol diet. She is  exercising regularly. OTC Supplements:yes. Pt is due for Lipid Panel previous labs not completed, pt has a h/o asymptomatic bilateral carotis stenosis    GENNY on CPAP:  Current treatment: cPAP. Compliance: compliant all of the time. Residual symptoms include: none. Pt is following w/Pulmonology    Rash  Pt c/o itchy reddened rash to BUE/BLE,waist band that started a few weeks ago, pt has tried OTC Hydrocortisone cream w/no improvement, denies any other family member w/similar rash, denies new soaps, foods, medications, lotions or recent travelling. Fatigue  Pt c/o chronic fatigue over the past few months, denies any changes in sleep patterns. Pt is requesting vitamin d and B12 levels today    Pt is due for Preventative Labs today, will add A1C d/t history of Elevated Glucose. Pt is also due for Pneumococcal 23 Vaccine    [facility]:  Patient's past medical, surgical, social and/or family history reviewed, updated in chart, and are non-contributory (unless otherwise stated). Medications and allergies also reviewed and updated in chart. Review of Systems  Review of Systems   Constitutional: Positive for fatigue (chronic). Negative for activity change, appetite change, chills, diaphoresis, fever and unexpected weight change. HENT: Negative for congestion, ear discharge, ear pain, facial swelling, hearing loss, mouth sores, nosebleeds, postnasal drip, rhinorrhea, sinus pressure, sinus pain, sneezing, sore throat, trouble swallowing and voice change. Eyes: Negative for photophobia, pain, discharge, redness, itching and visual disturbance.    Respiratory: Negative for apnea, cough, choking, chest tightness, shortness of breath, wheezing and stridor. GENNY on CPAP-follows w/Pulmonology   Cardiovascular: Negative for chest pain, palpitations and leg swelling. H/o asymptomatic bilateral carotid stenosis   Gastrointestinal: Negative for abdominal distention, abdominal pain, anal bleeding, blood in stool, constipation, diarrhea, nausea, rectal pain and vomiting. Endocrine: Negative for cold intolerance, heat intolerance, polydipsia, polyphagia and polyuria. H/o elevated glucose   Genitourinary: Negative for decreased urine volume, difficulty urinating, enuresis, flank pain, frequency, hematuria and urgency. Uterine Cancer   Musculoskeletal: Negative for arthralgias, joint swelling and myalgias. H/o right knee arthroscopy, h/o carpal tunnel, h/o localized Osteoporosis   Skin: Negative for color change, pallor and rash. Neurological: Negative for dizziness, tremors, seizures, syncope, facial asymmetry, speech difficulty, weakness, light-headedness, numbness and headaches. Hematological: Negative for adenopathy. Does not bruise/bleed easily. Psychiatric/Behavioral: Negative for decreased concentration, dysphoric mood, self-injury, sleep disturbance and suicidal ideas. The patient is not nervous/anxious. Physical Exam:    VS:  /71 (Site: Right Upper Arm, Position: Sitting, Cuff Size: Medium Adult)   Pulse 72   Temp 97.9 °F (36.6 °C) (Temporal)   Resp 18   Ht 5' (1.524 m)   Wt 115 lb (52.2 kg)   SpO2 99%   BMI 22.46 kg/m²   LAST WEIGHT:  Wt Readings from Last 3 Encounters:   10/18/21 115 lb (52.2 kg)   04/13/21 114 lb 11.2 oz (52 kg)   10/26/20 115 lb (52.2 kg)     Physical Exam  Vitals and nursing note reviewed. Constitutional:       General: She is not in acute distress. Appearance: Normal appearance. She is well-developed and normal weight. She is not ill-appearing, toxic-appearing or diaphoretic. HENT:      Head: Normocephalic and atraumatic.       Right Ear: Tympanic membrane, ear canal and external ear normal.      Left Ear: Tympanic membrane, ear canal and external ear normal.      Nose: Nose normal. No congestion or rhinorrhea. Mouth/Throat:      Mouth: Mucous membranes are moist.      Pharynx: Oropharynx is clear. No oropharyngeal exudate or posterior oropharyngeal erythema. Eyes:      General:         Right eye: No discharge. Left eye: No discharge. Conjunctiva/sclera: Conjunctivae normal.   Neck:      Thyroid: No thyromegaly. Vascular: No JVD. Cardiovascular:      Rate and Rhythm: Normal rate and regular rhythm. Pulses: Normal pulses. Heart sounds: Normal heart sounds. No murmur heard. No friction rub. Comments: No peripheral edema  Pulmonary:      Effort: Pulmonary effort is normal. No respiratory distress. Breath sounds: Normal breath sounds. No stridor. No wheezing, rhonchi or rales. Chest:      Chest wall: No tenderness. Abdominal:      Palpations: Abdomen is soft. Tenderness: There is no abdominal tenderness. Musculoskeletal:         General: No tenderness or deformity. Normal range of motion. Cervical back: Normal range of motion and neck supple. No rigidity. No muscular tenderness. Right lower leg: No edema. Left lower leg: No edema. Lymphadenopathy:      Cervical: No cervical adenopathy. Skin:     General: Skin is warm and dry. Coloration: Skin is not jaundiced or pale. Findings: Rash (fine, difuse,reddened rash present to BUE/BLE and waistband, tracking present) present. No bruising, erythema or lesion. Neurological:      Mental Status: She is alert and oriented to person, place, and time. Mental status is at baseline. Cranial Nerves: No cranial nerve deficit. Motor: No weakness or abnormal muscle tone. Gait: Gait normal.   Psychiatric:         Mood and Affect: Mood normal.         Behavior: Behavior normal.         Thought Content:  Thought content normal.         Judgment: Judgment normal.       Assessment / Plan:      Kalina Cullen was seen today for hyperlipidemia, sleep apnea, rash, fatigue and immunizations. Diagnoses and all orders for this visit:    Mixed hyperlipidemia  Continue Lifestyle Modifications  -     CBC Auto Differential; Future  -     Comprehensive Metabolic Panel; Future  -     TSH without Reflex; Future  -     Lipid Panel; Future    Carotid stenosis, asymptomatic, bilateral    GENNY on CPAP  continue current treatment plan according to Pulmonology    Chronic fatigue  -     Vitamin D 25 Hydroxy; Future  -     VITAMIN B12; Future  -     Hemoglobin A1C; Future    Malignant neoplasm of uterus, unspecified site (HCC)  -     CBC Auto Differential; Future  -     Comprehensive Metabolic Panel; Future  -     TSH without Reflex; Future  -     Lipid Panel; Future  -     Hemoglobin A1C; Future    Localized osteoporosis ([place])   Continue OTC Vitamin D and Calcium  Remain active daily  -     Vitamin D 25 Hydroxy; Future    Other abnormal glucose   -     Hemoglobin A1C; Future    Scabies  Will trial Ivermectin-see HPI d/t no improvement w/OTC creams  -     ivermectin 3 MG tablet; 3 tabs po daily x 5 days    Need for 23-polyvalent pneumococcal polysaccharide vaccine  -     Pneumococcal polysaccharide vaccine 23-valent greater than or equal to 1yo subcutaneous/IM         Call or go to ED immediately if symptoms worsen or persist.    Return in about 6 months (around 4/18/2022) for f/u Hyperlipidemia/GENNY. , or sooner if necessary. Educational materials and/or home exercises printed for patient's review and were included in patient instructions on his/her After Visit Summary and given to patient at the end of visit. Counseled regarding above diagnosis, including possible risks and complications,  especially if left uncontrolled.     Counseled regarding the possible side effects, risks, benefits and alternatives to treatment; patient and/or guardian verbalizes understanding, agrees, feels comfortable with and wishes to proceed with above treatment plan. Advised patient to call with any new medication issues, and read all Rx info from pharmacy to assure aware of all possible risks and side effects of medication before taking. Reviewed age and gender appropriate health screening exams and vaccinations. Advised patient regarding importance of keeping up with recommended health maintenance and to schedule as soon as possible if overdue, as this is important in assessing for undiagnosed pathology, especially cancer, as well as protecting against potentially harmful/life threatening disease. Patient and/or guardian verbalizes understanding and agrees with above counseling, assessment and plan. All questions answered.     Yarelis Carr, APRN - CNP

## 2021-10-27 PROBLEM — C54.1 ENDOMETRIAL CANCER (HCC): Status: ACTIVE | Noted: 2019-11-21

## 2021-11-17 LAB

## 2021-12-13 ENCOUNTER — NURSE ONLY (OUTPATIENT)
Dept: FAMILY MEDICINE CLINIC | Age: 76
End: 2021-12-13
Payer: MEDICARE

## 2021-12-13 DIAGNOSIS — Z23 NEED FOR PNEUMOCOCCAL VACCINATION: Primary | ICD-10-CM

## 2021-12-13 PROCEDURE — 90670 PCV13 VACCINE IM: CPT | Performed by: NURSE PRACTITIONER

## 2021-12-13 PROCEDURE — G0009 ADMIN PNEUMOCOCCAL VACCINE: HCPCS | Performed by: NURSE PRACTITIONER

## 2021-12-13 PROCEDURE — 99999 PR OFFICE/OUTPT VISIT,PROCEDURE ONLY: CPT | Performed by: NURSE PRACTITIONER

## 2021-12-30 ENCOUNTER — OFFICE VISIT (OUTPATIENT)
Dept: PRIMARY CARE CLINIC | Age: 76
End: 2021-12-30
Payer: MEDICARE

## 2021-12-30 VITALS
OXYGEN SATURATION: 99 % | DIASTOLIC BLOOD PRESSURE: 77 MMHG | TEMPERATURE: 97.2 F | HEIGHT: 60 IN | SYSTOLIC BLOOD PRESSURE: 166 MMHG | RESPIRATION RATE: 18 BRPM | HEART RATE: 75 BPM | WEIGHT: 118 LBS | BODY MASS INDEX: 23.16 KG/M2

## 2021-12-30 DIAGNOSIS — J02.9 SORE THROAT: ICD-10-CM

## 2021-12-30 DIAGNOSIS — R05.9 COUGH: ICD-10-CM

## 2021-12-30 DIAGNOSIS — J06.9 VIRAL URI: ICD-10-CM

## 2021-12-30 LAB
Lab: NORMAL
PERFORMING INSTRUMENT: NORMAL
QC PASS/FAIL: NORMAL
SARS-COV-2, POC: NORMAL

## 2021-12-30 PROCEDURE — G8420 CALC BMI NORM PARAMETERS: HCPCS | Performed by: NURSE PRACTITIONER

## 2021-12-30 PROCEDURE — G8484 FLU IMMUNIZE NO ADMIN: HCPCS | Performed by: NURSE PRACTITIONER

## 2021-12-30 PROCEDURE — 1036F TOBACCO NON-USER: CPT | Performed by: NURSE PRACTITIONER

## 2021-12-30 PROCEDURE — 1090F PRES/ABSN URINE INCON ASSESS: CPT | Performed by: NURSE PRACTITIONER

## 2021-12-30 PROCEDURE — G8400 PT W/DXA NO RESULTS DOC: HCPCS | Performed by: NURSE PRACTITIONER

## 2021-12-30 PROCEDURE — 1123F ACP DISCUSS/DSCN MKR DOCD: CPT | Performed by: NURSE PRACTITIONER

## 2021-12-30 PROCEDURE — 99213 OFFICE O/P EST LOW 20 MIN: CPT | Performed by: NURSE PRACTITIONER

## 2021-12-30 PROCEDURE — 4040F PNEUMOC VAC/ADMIN/RCVD: CPT | Performed by: NURSE PRACTITIONER

## 2021-12-30 PROCEDURE — 87426 SARSCOV CORONAVIRUS AG IA: CPT | Performed by: NURSE PRACTITIONER

## 2021-12-30 PROCEDURE — G8427 DOCREV CUR MEDS BY ELIG CLIN: HCPCS | Performed by: NURSE PRACTITIONER

## 2022-04-18 ENCOUNTER — OFFICE VISIT (OUTPATIENT)
Dept: FAMILY MEDICINE CLINIC | Age: 77
End: 2022-04-18
Payer: MEDICARE

## 2022-04-18 VITALS
OXYGEN SATURATION: 98 % | HEIGHT: 60 IN | WEIGHT: 119 LBS | SYSTOLIC BLOOD PRESSURE: 152 MMHG | RESPIRATION RATE: 20 BRPM | DIASTOLIC BLOOD PRESSURE: 70 MMHG | BODY MASS INDEX: 23.36 KG/M2 | TEMPERATURE: 97.7 F | HEART RATE: 67 BPM

## 2022-04-18 DIAGNOSIS — I10 WHITE COAT SYNDROME WITH DIAGNOSIS OF HYPERTENSION: ICD-10-CM

## 2022-04-18 DIAGNOSIS — C55 MALIGNANT NEOPLASM OF UTERUS, UNSPECIFIED SITE (HCC): ICD-10-CM

## 2022-04-18 DIAGNOSIS — Z23 NEED FOR PROPHYLACTIC VACCINATION AND INOCULATION AGAINST VARICELLA: ICD-10-CM

## 2022-04-18 DIAGNOSIS — E78.00 PURE HYPERCHOLESTEROLEMIA: ICD-10-CM

## 2022-04-18 DIAGNOSIS — Z00.00 INITIAL MEDICARE ANNUAL WELLNESS VISIT: Primary | ICD-10-CM

## 2022-04-18 PROBLEM — E78.1 PURE HYPERTRIGLYCERIDEMIA: Status: ACTIVE | Noted: 2022-04-18

## 2022-04-18 PROCEDURE — 1123F ACP DISCUSS/DSCN MKR DOCD: CPT | Performed by: STUDENT IN AN ORGANIZED HEALTH CARE EDUCATION/TRAINING PROGRAM

## 2022-04-18 PROCEDURE — G0438 PPPS, INITIAL VISIT: HCPCS | Performed by: STUDENT IN AN ORGANIZED HEALTH CARE EDUCATION/TRAINING PROGRAM

## 2022-04-18 PROCEDURE — 4040F PNEUMOC VAC/ADMIN/RCVD: CPT | Performed by: STUDENT IN AN ORGANIZED HEALTH CARE EDUCATION/TRAINING PROGRAM

## 2022-04-18 SDOH — ECONOMIC STABILITY: FOOD INSECURITY: WITHIN THE PAST 12 MONTHS, YOU WORRIED THAT YOUR FOOD WOULD RUN OUT BEFORE YOU GOT MONEY TO BUY MORE.: NEVER TRUE

## 2022-04-18 SDOH — ECONOMIC STABILITY: FOOD INSECURITY: WITHIN THE PAST 12 MONTHS, THE FOOD YOU BOUGHT JUST DIDN'T LAST AND YOU DIDN'T HAVE MONEY TO GET MORE.: NEVER TRUE

## 2022-04-18 ASSESSMENT — VISUAL ACUITY
OS_CC: 20/40
OD_CC: 20/50

## 2022-04-18 ASSESSMENT — PATIENT HEALTH QUESTIONNAIRE - PHQ9
SUM OF ALL RESPONSES TO PHQ QUESTIONS 1-9: 0
4. FEELING TIRED OR HAVING LITTLE ENERGY: 0
10. IF YOU CHECKED OFF ANY PROBLEMS, HOW DIFFICULT HAVE THESE PROBLEMS MADE IT FOR YOU TO DO YOUR WORK, TAKE CARE OF THINGS AT HOME, OR GET ALONG WITH OTHER PEOPLE: 0
8. MOVING OR SPEAKING SO SLOWLY THAT OTHER PEOPLE COULD HAVE NOTICED. OR THE OPPOSITE, BEING SO FIGETY OR RESTLESS THAT YOU HAVE BEEN MOVING AROUND A LOT MORE THAN USUAL: 0
SUM OF ALL RESPONSES TO PHQ9 QUESTIONS 1 & 2: 0
SUM OF ALL RESPONSES TO PHQ QUESTIONS 1-9: 0
6. FEELING BAD ABOUT YOURSELF - OR THAT YOU ARE A FAILURE OR HAVE LET YOURSELF OR YOUR FAMILY DOWN: 0
SUM OF ALL RESPONSES TO PHQ QUESTIONS 1-9: 0
3. TROUBLE FALLING OR STAYING ASLEEP: 0
SUM OF ALL RESPONSES TO PHQ QUESTIONS 1-9: 0
5. POOR APPETITE OR OVEREATING: 0
2. FEELING DOWN, DEPRESSED OR HOPELESS: 0
7. TROUBLE CONCENTRATING ON THINGS, SUCH AS READING THE NEWSPAPER OR WATCHING TELEVISION: 0
1. LITTLE INTEREST OR PLEASURE IN DOING THINGS: 0
9. THOUGHTS THAT YOU WOULD BE BETTER OFF DEAD, OR OF HURTING YOURSELF: 0

## 2022-04-18 ASSESSMENT — SOCIAL DETERMINANTS OF HEALTH (SDOH): HOW HARD IS IT FOR YOU TO PAY FOR THE VERY BASICS LIKE FOOD, HOUSING, MEDICAL CARE, AND HEATING?: NOT HARD AT ALL

## 2022-04-18 NOTE — PATIENT INSTRUCTIONS
Personalized Preventive Plan for Jessica Alberts - 4/18/2022  Medicare offers a range of preventive health benefits. Some of the tests and screenings are paid in full while other may be subject to a deductible, co-insurance, and/or copay. Some of these benefits include a comprehensive review of your medical history including lifestyle, illnesses that may run in your family, and various assessments and screenings as appropriate. After reviewing your medical record and screening and assessments performed today your provider may have ordered immunizations, labs, imaging, and/or referrals for you. A list of these orders (if applicable) as well as your Preventive Care list are included within your After Visit Summary for your review. Other Preventive Recommendations:    · A preventive eye exam performed by an eye specialist is recommended every 1-2 years to screen for glaucoma; cataracts, macular degeneration, and other eye disorders. · A preventive dental visit is recommended every 6 months. · Try to get at least 150 minutes of exercise per week or 10,000 steps per day on a pedometer . · Order or download the FREE \"Exercise & Physical Activity: Your Everyday Guide\" from [employer]. Call 8-545.935.5868 or search [employer] online. · You need 6635-0860 mg of calcium and 1157-5262 IU of vitamin D per day. It is possible to meet your calcium requirement with diet alone, but a vitamin D supplement is usually necessary to meet this goal.  · When exposed to the sun, use a sunscreen that protects against both UVA and UVB radiation with an SPF of 30 or greater. Reapply every 2 to 3 hours or after sweating, drying off with a towel, or swimming. · Always wear a seat belt when traveling in a car. Always wear a helmet when riding a bicycle or motorcycle.

## 2022-04-18 NOTE — PROGRESS NOTES
Medicare Annual Wellness Visit    Zahra Pablo is here for [facility] [place] Care and Medicare AWV    Assessment & Plan      1. Initial Medicare annual wellness visit  2. Malignant neoplasm of uterus, unspecified site [facility])  Chronic, following with oncology, slight twinge in her right lower quadrant, gets repeat CT scan in June, thoroughly discussed healthy lifestyle and help with recurrent cancers, she does have plans to increase activity and decrease cookies, recommended phytonutrients, healthy proteins  3. White coat syndrome with diagnosis of hypertension  Chronic, notes at home it is 120s 130s over 60s, declined starting medication, discussed using mind-body techniques when she is going to doctor visits in order to help reduce blood pressure, continue home readings intermittently, healthy lifestyle  4. Need for prophylactic vaccination and inoculation against varicella  -     zoster recombinant adjuvanted vaccine [facility]) 50 MCG/0.5ML SUSR injection; Inject 0.5 mLs into the muscle once for 1 dose, Disp-0.5 mL, R-0Print  5. Pure hypercholesterolemia  Chronic, 27% ASCVD risk, that is with her elevated blood pressure here in the office, Patient declined statin medication, discussed with patient that ASCVD risk is high for heart attack and stroke in the next 10 years and that guideline directed medical therapy based on evidence recommends the high intensity statin. Shared decision making used and evidence shared with patient as well as mechanism of action to prevent stroke and heart attack of the statin.   Patient endorsed understanding is willing to incur risk and will follow strict lifestyle modifications including cardio metabolic food plan, weight loss, 150 minutes of cardiovascular exercise a week, and stress reduction techniques        Recommendations for Preventive Services Due: see orders and patient instructions/AVS.  Recommended screening schedule for the next 5-10 years is provided to the patient in written form: see Patient Instructions/AVS.     Return in 6 months (on 10/21/2022) for 6 month for chroinc disease, Medicare Annual Wellness Visit in 1 year. Subjective   The following acute and/or chronic problems were also addressed today:  See above      Patient's complete Health Risk Assessment and screening values have been reviewed and are found in Flowsheets. The following problems were reviewed today and where indicated follow up appointments were made and/or referrals ordered. Positive Risk Factor Screenings with Interventions:    Fall Risk:  Do you feel unsteady or are you worried about falling? : (!) yes (once in a while pt states she having pain rt knee)  2 or more falls in past year?: no  Fall with injury in past year?: no     Fall Risk Interventions:  Had arthroscopy and sees ortho. Offered steroid shot.    · Home exercises provided to promote strength and balance              General Health and ACP:  General  In general, how would you say your health is?: Very Good  In the past 7 days, have you experienced any of the following: New or Increased Pain, New or Increased Fatigue, Loneliness, Social Isolation, Stress or Anger?: (!) Yes  Select all that apply: (!) New or Increased Pain  Do you get the social and emotional support that you need?: Yes  Do you have a Living Will?: Yes    Advance Directives     Power of  Living Will ACP-Advance Directive ACP-Power of     Not on File Filed on 06/20/13 Filed Not on File      General Health Risk Interventions:  · Pain issues: rigth knee pain and sees chirpproator        Hearing/Vision:  Do you or your family notice any trouble with your hearing that hasn't been managed with hearing aids?: No  Do you have difficulty driving, watching TV, or doing any of your daily activities because of your eyesight?: No  Have you had an eye exam within the past year?: Yes   Visual Acuity Screening    Right eye Left eye Both eyes   Without correction: With correction: 20/50 20/40 20/40     Hearing/Vision Interventions:  · Hearing concerns:  patient declines any further evaluation/treatment for hearing issues    Safety:  Do you have working smoke detectors?: Yes  Do you have any tripping hazards - loose or unsecured carpets or rugs?: (!) Yes  Do you have any tripping hazards - clutter in doorways, halls, or stairs?: No  Do you have either shower bars, grab bars, non-slip mats or non-slip surfaces in your shower or bathtub?: Yes  Do all of your stairways have a railing or banister?: Yes  Do you always fasten your seatbelt when you are in a car?: Yes    Safety Interventions:  · Home safety tips provided           Objective   Vitals:    04/18/22 1112 04/18/22 1113 04/18/22 1146   BP: (!) 154/82 (!) 161/74 (!) 152/70   Site: Left Upper Arm Left Upper Arm Left Upper Arm   Position: Sitting Sitting Sitting   Cuff Size: Medium Adult Medium Adult Medium Adult   Pulse: 67     Resp: 20     Temp: 97.7 °F (36.5 °C)     TempSrc: Temporal     SpO2: 98%     Weight: 119 lb (54 kg)     Height: 5' (1.524 m)        Body mass index is 23.24 kg/m².         General Appearance: alert and oriented to person, place and time, well developed and well- nourished, in no acute distress  Skin: warm and dry, no rash or erythema  Head: normocephalic and atraumatic  Eyes: pupils equal, round, and reactive to light, extraocular eye movements intact, conjunctivae normal  ENT: tympanic membrane, external ear and ear canal normal bilaterally, nose without deformity, nasal mucosa and turbinates normal without polyps  Neck: supple and non-tender without mass, no thyromegaly or thyroid nodules, no cervical lymphadenopathy  Pulmonary/Chest: clear to auscultation bilaterally- no wheezes, rales or rhonchi, normal air movement, no respiratory distress  Cardiovascular: normal rate, regular rhythm, normal S1 and S2, no murmurs, rubs, clicks, or gallops, distal pulses intact, no carotid bruits  Abdomen: soft, non-tender, non-distended, normal bowel sounds, no masses or organomegaly  Extremities: no cyanosis, clubbing or edema  Musculoskeletal: normal range of motion, no joint swelling, deformity or tenderness  Neurologic:, no cranial nerve deficit, gait, coordination and speech normal       Allergies   Allergen Reactions    Azmacort [Triamcinolone] Swelling    Doxycycline Rash    Mepron [Atovaquone] Rash    Zithromax [Azithromycin] Rash     Prior to Visit Medications    Medication Sig Taking?  Authorizing Provider   zoster recombinant adjuvanted vaccine [facility]) 50 MCG/0.5ML SUSR injection Inject 0.5 mLs into the muscle once for 1 dose Yes Erik Stroud MD   vitamin E 400 UNIT capsule Take 400 Units by mouth daily Yes Historical Provider, MD   Nutritional Supplements ([place]/SWEET POTATOES/PEACHES PO) Take by mouth daily Indications: [place] tail Yes Historical Provider, MD   Vitamin E (NUTRA-E EX) Apply topically daily Yes Historical Provider, MD   Mart[place] Stain Oil (OMEGA-3) 500 MG CAPS Take 500 mg by mouth daily Yes Historical Provider, MD   Ascorbic Acid (VITAMIN C) 1000 MG tablet Take 1,000 mg by mouth daily Yes Historical Provider, MD   QUERCETIN PO Take 800 mg by mouth Yes Historical Provider, MD   Multiple Vitamins-Minerals (THERAPEUTIC MULTIVITAMIN-MINERALS) tablet Take 1 tablet by mouth daily Yes Historical Provider, MD   acetaminophen (TYLENOL) 325 MG tablet Take 650 mg by mouth every 6 hours as needed for Pain Yes Historical Provider, MD   vitamin D-3 (CHOLECALCIFEROL) 5000 UNITS TABS Take 5,000 Units by mouth daily Last dose 2-6-20 Yes Historical Provider, MD   Magnesium 500 MG CAPS Take by mouth every evening Last dose 2-6-20 Yes Historical Provider, MD   Cetirizine HCl (ZYRTEC PO) Take 1 tablet by mouth daily  Yes Historical Provider, MD   aspirin 81 MG EC tablet Take 81 mg by mouth daily Per PCP; last dose 2-6-20 Yes Historical Provider, MD [place] (Including outside providers/suppliers regularly involved in providing care):   Patient Care Team:  Moustapha Villanueva MD as PCP - General (Family Medicine)  LINO Cottrell CNP as PCP - [facility] Ellie Flores Provider    Reviewed and updated this visit:  Tobacco  Allergies  Meds  Problems  Med Hx  Surg Hx  Soc Hx  Fam Hx        The 10-year ASCVD risk score (Patsy Yip, et al., 2013) is: 24.6%    Values used to calculate the score:      Age: 68 years      Sex: Female      Is Non- : No      Diabetic: No      Tobacco smoker: No      Systolic Blood Pressure: 935 mmHg      Is BP treated: No      HDL Cholesterol: 76 mg/dL      Total Cholesterol: 247 mg/dL           Advance Care Planning   Advanced Care Planning: Discussed the patients choices for care and treatment in case of a health event that adversely affects decision-making abilities. Also discussed the patients long-term treatment options. Reviewed with the patient the appropriate state-specific advance directive documents. Reviewed the process of designating a competent adult as an Agent (or -in-fact) that could take make health care decisions for the patient if incompetent. Patient was asked to complete the declaration forms, either acknowledge the forms by a public notary or an eligible witness and provide a signed copy to the practice office.   Time spent (minutes):1-she has documents completed and in the record

## 2022-10-06 DIAGNOSIS — I10 WHITE COAT SYNDROME WITH DIAGNOSIS OF HYPERTENSION: ICD-10-CM

## 2022-10-06 DIAGNOSIS — E78.2 MIXED HYPERLIPIDEMIA: Primary | ICD-10-CM

## 2022-10-06 DIAGNOSIS — Z00.00 HEALTH MAINTENANCE EXAMINATION: ICD-10-CM

## 2022-10-06 DIAGNOSIS — R73.09 OTHER ABNORMAL GLUCOSE: ICD-10-CM

## 2022-10-06 DIAGNOSIS — Z11.59 NEED FOR HEPATITIS C SCREENING TEST: ICD-10-CM

## 2022-10-10 ENCOUNTER — TELEPHONE (OUTPATIENT)
Dept: FAMILY MEDICINE CLINIC | Age: 77
End: 2022-10-10

## 2022-10-19 ENCOUNTER — OFFICE VISIT (OUTPATIENT)
Dept: FAMILY MEDICINE CLINIC | Age: 77
End: 2022-10-19
Payer: MEDICARE

## 2022-10-19 VITALS
DIASTOLIC BLOOD PRESSURE: 81 MMHG | HEART RATE: 78 BPM | WEIGHT: 118.8 LBS | TEMPERATURE: 96.7 F | OXYGEN SATURATION: 99 % | RESPIRATION RATE: 20 BRPM | HEIGHT: 60 IN | BODY MASS INDEX: 23.32 KG/M2 | SYSTOLIC BLOOD PRESSURE: 162 MMHG

## 2022-10-19 DIAGNOSIS — G89.29 CHRONIC BILATERAL LOW BACK PAIN WITHOUT SCIATICA: Primary | ICD-10-CM

## 2022-10-19 DIAGNOSIS — R73.09 OTHER ABNORMAL GLUCOSE: ICD-10-CM

## 2022-10-19 DIAGNOSIS — E78.2 MIXED HYPERLIPIDEMIA: ICD-10-CM

## 2022-10-19 DIAGNOSIS — I10 WHITE COAT SYNDROME WITH DIAGNOSIS OF HYPERTENSION: ICD-10-CM

## 2022-10-19 DIAGNOSIS — C54.1 ENDOMETRIAL CANCER (HCC): ICD-10-CM

## 2022-10-19 DIAGNOSIS — M54.50 CHRONIC BILATERAL LOW BACK PAIN WITHOUT SCIATICA: Primary | ICD-10-CM

## 2022-10-19 DIAGNOSIS — Z11.59 NEED FOR HEPATITIS C SCREENING TEST: ICD-10-CM

## 2022-10-19 DIAGNOSIS — M54.17 LUMBOSACRAL RADICULOPATHY: ICD-10-CM

## 2022-10-19 DIAGNOSIS — G47.33 OSA ON CPAP: ICD-10-CM

## 2022-10-19 DIAGNOSIS — Z99.89 OSA ON CPAP: ICD-10-CM

## 2022-10-19 LAB
ALBUMIN SERPL-MCNC: 4.5 G/DL (ref 3.5–5.2)
ALP BLD-CCNC: 125 U/L (ref 35–104)
ALT SERPL-CCNC: 28 U/L (ref 0–32)
ANION GAP SERPL CALCULATED.3IONS-SCNC: 14 MMOL/L (ref 7–16)
AST SERPL-CCNC: 31 U/L (ref 0–31)
BASOPHILS ABSOLUTE: 0.02 E9/L (ref 0–0.2)
BASOPHILS RELATIVE PERCENT: 0.5 % (ref 0–2)
BILIRUB SERPL-MCNC: <0.2 MG/DL (ref 0–1.2)
BUN BLDV-MCNC: 17 MG/DL (ref 6–23)
CALCIUM SERPL-MCNC: 9.9 MG/DL (ref 8.6–10.2)
CHLORIDE BLD-SCNC: 98 MMOL/L (ref 98–107)
CHOLESTEROL, TOTAL: 256 MG/DL (ref 0–199)
CO2: 23 MMOL/L (ref 22–29)
CREAT SERPL-MCNC: 0.8 MG/DL (ref 0.5–1)
EOSINOPHILS ABSOLUTE: 0.05 E9/L (ref 0.05–0.5)
EOSINOPHILS RELATIVE PERCENT: 1.2 % (ref 0–6)
GFR SERPL CREATININE-BSD FRML MDRD: >60 ML/MIN/1.73
GLUCOSE BLD-MCNC: 100 MG/DL (ref 74–99)
HBA1C MFR BLD: 5.7 % (ref 4–5.6)
HCT VFR BLD CALC: 37 % (ref 34–48)
HDLC SERPL-MCNC: 81 MG/DL
HEMOGLOBIN: 12.7 G/DL (ref 11.5–15.5)
IMMATURE GRANULOCYTES #: 0.01 E9/L
IMMATURE GRANULOCYTES %: 0.2 % (ref 0–5)
LDL CHOLESTEROL CALCULATED: 155 MG/DL (ref 0–99)
LYMPHOCYTES ABSOLUTE: 1.08 E9/L (ref 1.5–4)
LYMPHOCYTES RELATIVE PERCENT: 26 % (ref 20–42)
MCH RBC QN AUTO: 32 PG (ref 26–35)
MCHC RBC AUTO-ENTMCNC: 34.3 % (ref 32–34.5)
MCV RBC AUTO: 93.2 FL (ref 80–99.9)
MONOCYTES ABSOLUTE: 0.52 E9/L (ref 0.1–0.95)
MONOCYTES RELATIVE PERCENT: 12.5 % (ref 2–12)
NEUTROPHILS ABSOLUTE: 2.47 E9/L (ref 1.8–7.3)
NEUTROPHILS RELATIVE PERCENT: 59.6 % (ref 43–80)
PDW BLD-RTO: 12.6 FL (ref 11.5–15)
PLATELET # BLD: 313 E9/L (ref 130–450)
PMV BLD AUTO: 9.7 FL (ref 7–12)
POTASSIUM SERPL-SCNC: 4.7 MMOL/L (ref 3.5–5)
RBC # BLD: 3.97 E12/L (ref 3.5–5.5)
SODIUM BLD-SCNC: 135 MMOL/L (ref 132–146)
TOTAL PROTEIN: 7.2 G/DL (ref 6.4–8.3)
TRIGL SERPL-MCNC: 102 MG/DL (ref 0–149)
TSH SERPL DL<=0.05 MIU/L-ACNC: 1.27 UIU/ML (ref 0.27–4.2)
VLDLC SERPL CALC-MCNC: 20 MG/DL
WBC # BLD: 4.2 E9/L (ref 4.5–11.5)

## 2022-10-19 PROCEDURE — G8420 CALC BMI NORM PARAMETERS: HCPCS | Performed by: STUDENT IN AN ORGANIZED HEALTH CARE EDUCATION/TRAINING PROGRAM

## 2022-10-19 PROCEDURE — G8400 PT W/DXA NO RESULTS DOC: HCPCS | Performed by: STUDENT IN AN ORGANIZED HEALTH CARE EDUCATION/TRAINING PROGRAM

## 2022-10-19 PROCEDURE — 99214 OFFICE O/P EST MOD 30 MIN: CPT | Performed by: STUDENT IN AN ORGANIZED HEALTH CARE EDUCATION/TRAINING PROGRAM

## 2022-10-19 PROCEDURE — 1090F PRES/ABSN URINE INCON ASSESS: CPT | Performed by: STUDENT IN AN ORGANIZED HEALTH CARE EDUCATION/TRAINING PROGRAM

## 2022-10-19 PROCEDURE — 1123F ACP DISCUSS/DSCN MKR DOCD: CPT | Performed by: STUDENT IN AN ORGANIZED HEALTH CARE EDUCATION/TRAINING PROGRAM

## 2022-10-19 PROCEDURE — G8427 DOCREV CUR MEDS BY ELIG CLIN: HCPCS | Performed by: STUDENT IN AN ORGANIZED HEALTH CARE EDUCATION/TRAINING PROGRAM

## 2022-10-19 PROCEDURE — 1036F TOBACCO NON-USER: CPT | Performed by: STUDENT IN AN ORGANIZED HEALTH CARE EDUCATION/TRAINING PROGRAM

## 2022-10-19 PROCEDURE — G8484 FLU IMMUNIZE NO ADMIN: HCPCS | Performed by: STUDENT IN AN ORGANIZED HEALTH CARE EDUCATION/TRAINING PROGRAM

## 2022-10-19 ASSESSMENT — ENCOUNTER SYMPTOMS
SHORTNESS OF BREATH: 0
COUGH: 0
BACK PAIN: 1
ABDOMINAL PAIN: 0
ABDOMINAL DISTENTION: 0
WHEEZING: 0

## 2022-10-19 NOTE — PROGRESS NOTES
Isi Chin (:  1945) is a 68 y.o. female, established patient follow up , here for evaluation of the followin Month Follow-Up         ASSESSMENT/PLAN  She did have questions about today about her health, we did discuss her risk of 30% recommendation for statin, she continues to decline, did discuss signs and symptoms of heart disease which she denied and that if she does have these we could send her for stress test, also discussed of her palpitations that happen once a month became more regular we could do monitor, answered all her questions and she felt comfortable without any further cardiac work-up    1. Chronic bilateral low back pain without sciatica  Chronic, with radicular symptoms, has gone to chiropractor her 12 weeks of conservative treatment, will get x-ray to make sure she does not have any compression fractures and then order MRI, continue her current exercises from chiropractor, TENS, over-the-counter Tylenol and ibuprofen could consider vascular cause as she does have atherosclerotic plaques in her arteries on the CT scan pelvis from [facility]  -     XR LUMBAR SPINE (MIN 4 VIEWS); Future  -     MRI LUMBAR SPINE WO CONTRAST; Future  2. Lumbosacral radiculopathy  -     XR LUMBAR SPINE (MIN 4 VIEWS); Future  -     MRI LUMBAR SPINE WO CONTRAST; Future  3. GENNY on CPAP  Chronic, compliant, continue  4. Endometrial cancer ([address]  Chronic, no further signs of malignancy, continues to follow with [facility] at imaging in , mostly normal with some uptake in her jaw  5. White coat syndrome with diagnosis of hypertension  Chronic, continues to be elevated on recheck, will use her home readings, no medication changes  Return in about 6 months (around 2023) for Follow up chronic disease. Subjective   SUBJECTIVE/OBJECTIVE:  HPI    She is here for follow up. She need labs done. She has low back pain that radiates bilaterally down to back mid thigh. Started in July.  She was working outside and does nto remember specific injury possibly over use. Sometimes pain goes al the way her legs. She notes worse with walking. She has been to 12 weeks of PT, MT, conservative treatment and TENS with chiropractor. She has a not from him today. She wonders if she needs a cardiologist. She feels that she sometimes has extra beats of heart which takes her breath away. Happens once a month. She check BP at home and is 130/65 on average     Declined preventative screening identified as care gaps unless ordered through this visit    PHQ2/PHQ9      No data recorded     Past Medical History:  has a past medical history of Babesiosis, Endometrial cancer ([facility] [address] Hyperlipidemia, and Sleep apnea. Past Surgical History:  has a past surgical history that includes Tubal ligation (1980); Tonsillectomy (1947); Bunionectomy (2006 2007); cyst removal (2010); Colonoscopy; other surgical history (Right, 7/3/15); Finger trigger release (Left, 12/18/2018); Colonoscopy (N/A, 12/4/2019); Carpal tunnel release (Bilateral); Hysterectomy (12/17/2019); Port Surgery (N/A, 2/11/2020); and Injection (Right, 06/2021). Social History:  reports that she has never smoked. She has never used smokeless tobacco. She reports current alcohol use. She reports that she does not use drugs. Family History: family history includes Other in her father and mother.   Allergies: Azmacort [triamcinolone], Doxycycline, Mepron [atovaquone], and Zithromax [azithromycin]  Medications:   Current Outpatient Medications   Medication Sig Dispense Refill    Misc Natural Products (GLUCOSAMINE CHOND COMPLEX/MSM) TABS Take 1 tablet by mouth daily      vitamin E 400 UNIT capsule Take 400 Units by mouth daily      Vitamin E (NUTRA-E EX) Apply topically daily      Krill Oil (OMEGA-3) 500 MG CAPS Take 500 mg by mouth daily      Ascorbic Acid (VITAMIN C) 1000 MG tablet Take 1,000 mg by mouth daily      QUERCETIN PO Take 800 mg by mouth acetaminophen (TYLENOL) 325 MG tablet Take 650 mg by mouth every 6 hours as needed for Pain      vitamin D-3 (CHOLECALCIFEROL) 5000 UNITS TABS Take 5,000 Units by mouth daily Last dose 2-6-20      Magnesium 500 MG CAPS Take by mouth every evening Last dose 2-6-20      Cetirizine HCl (ZYRTEC PO) Take 1 tablet by mouth daily       aspirin 81 MG EC tablet Take 81 mg by mouth daily Per PCP; last dose 2-6-20       No current facility-administered medications for this visit. Allergies: Azmacort [triamcinolone], Doxycycline, Mepron [atovaquone], and Zithromax [azithromycin]     Review of Systems   Constitutional:  Negative for chills, fatigue, fever and unexpected weight change. Respiratory:  Negative for cough, shortness of breath and wheezing. Cardiovascular:  Negative for chest pain, palpitations and leg swelling. Gastrointestinal:  Negative for abdominal distention and abdominal pain. Genitourinary:  Negative for dysuria. Musculoskeletal:  Positive for back pain. Negative for arthralgias. Neurological:  Negative for weakness, light-headedness, numbness and headaches. All other systems reviewed and are negative.        Objective   BP (!) 162/81   Pulse 78   Temp (!) 96.7 °F (35.9 °C) (Temporal)   Resp 20   Ht 5' (1.524 m)   Wt 118 lb 12.8 oz (53.9 kg)   SpO2 99%   BMI 23.20 kg/m²       Lab Results   Component Value Date    LABA1C 5.3 10/18/2021     Lab Results   Component Value Date    CHOL 247 (H) 10/18/2021     Lab Results   Component Value Date    TRIG 101 10/18/2021     Lab Results   Component Value Date    HDL 76 10/18/2021     Lab Results   Component Value Date    LDLCALC 151 (H) 10/18/2021     Lab Results   Component Value Date    LABVLDL 20 10/18/2021     No results found for: CHOLHDLRATIO   Creatinine   Date Value Ref Range Status   10/18/2021 0.8 0.5 - 1.0 mg/dL Final   06/20/2013 0.6 0.5 - 1.1 mg/dL Final       The 10-year ASCVD risk score (Mary ALEJO, et al., 2019) is: 30.3% Values used to calculate the score:      Age: 68 years      Sex: Female      Is Non- : No      Diabetic: No      Tobacco smoker: No      Systolic Blood Pressure: 739 mmHg      Is BP treated: No      HDL Cholesterol: 76 mg/dL      Total Cholesterol: 247 mg/dL     Physical Exam  Constitutional:       General: She is not in acute distress. Appearance: Normal appearance. HENT:      Head: Normocephalic and atraumatic. Right Ear: External ear normal.      Nose: Nose normal.      Mouth/Throat:      Mouth: Mucous membranes are moist.   Eyes:      Extraocular Movements: Extraocular movements intact. Conjunctiva/sclera: Conjunctivae normal.   Cardiovascular:      Rate and Rhythm: Normal rate and regular rhythm. Pulses: Normal pulses. Heart sounds: No murmur heard. Pulmonary:      Effort: Pulmonary effort is normal.      Breath sounds: Normal breath sounds. No wheezing. Musculoskeletal:         General: Normal range of motion. Right lower leg: No edema. Left lower leg: No edema. Comments: No Midline tenderness of cervical thoracic lumbar spine, negative straight leg testing tenderness around sacroiliac joints neurovascularly intact, no gait abnormality   Neurological:      Mental Status: She is alert. Psychiatric:         Mood and Affect: Mood normal.         Behavior: Behavior normal.           An electronic signature was used to authenticate this note. --Maycol Winston MD       *NOTE: This report was transcribed using voice recognition software. Every effort was made to ensure accuracy; however, inadvertent computerized transcription errors may be present.

## 2022-10-20 LAB — HEPATITIS C ANTIBODY INTERPRETATION: NORMAL

## 2022-11-11 ENCOUNTER — HOSPITAL ENCOUNTER (OUTPATIENT)
Dept: MRI IMAGING | Age: 77
Discharge: HOME | End: 2022-11-13
Payer: MEDICARE

## 2022-11-11 DIAGNOSIS — M54.17 LUMBOSACRAL RADICULOPATHY: ICD-10-CM

## 2022-11-11 DIAGNOSIS — G89.29 CHRONIC BILATERAL LOW BACK PAIN WITHOUT SCIATICA: ICD-10-CM

## 2022-11-11 DIAGNOSIS — M54.50 CHRONIC BILATERAL LOW BACK PAIN WITHOUT SCIATICA: ICD-10-CM

## 2022-11-11 PROCEDURE — 72148 MRI LUMBAR SPINE W/O DYE: CPT

## 2022-11-14 DIAGNOSIS — M54.17 LUMBOSACRAL RADICULOPATHY: Primary | ICD-10-CM

## 2022-11-14 DIAGNOSIS — R93.7 ABNORMAL MRI, LUMBAR SPINE: ICD-10-CM

## 2022-11-14 NOTE — PROGRESS NOTES
I did call the patient to review her MRI with her, she does have 1 area of abnormality that recommended bone scan, she notes she did have bone scan June 28 at [place] [address], she is worried about her cancer coming back potentially with a metastasis to the back, she we will get these records and see if there is any kind of uptake or mention of the lumbar region, will refer her to neurosurgery especially with the anterolisthesis to see if she would need any kind of surgical intervention versus pain management, the MRI was printed off and she will give it to her chiropractor to make sure none of the chiropractic techniques would be exacerbating her current conditions, she would like to hold off on getting another bone scan now, she will talk with the [facility]    1. Lumbosacral radiculopathy  -     Timo Weston MD, Neurosurgery, [place]  2.  Abnormal MRI, lumbar spine  -     Timo Weston MD, Neurosurgery, [place]

## 2022-12-02 ENCOUNTER — OFFICE VISIT (OUTPATIENT)
Dept: NEUROSURGERY | Age: 77
End: 2022-12-02
Payer: MEDICARE

## 2022-12-02 VITALS
SYSTOLIC BLOOD PRESSURE: 187 MMHG | OXYGEN SATURATION: 97 % | WEIGHT: 118 LBS | HEIGHT: 60 IN | BODY MASS INDEX: 23.16 KG/M2 | DIASTOLIC BLOOD PRESSURE: 71 MMHG | HEART RATE: 70 BPM

## 2022-12-02 DIAGNOSIS — M48.062 SPINAL STENOSIS OF LUMBAR REGION WITH NEUROGENIC CLAUDICATION: Primary | ICD-10-CM

## 2022-12-02 PROCEDURE — G8420 CALC BMI NORM PARAMETERS: HCPCS | Performed by: NEUROLOGICAL SURGERY

## 2022-12-02 PROCEDURE — 1090F PRES/ABSN URINE INCON ASSESS: CPT | Performed by: NEUROLOGICAL SURGERY

## 2022-12-02 PROCEDURE — G8427 DOCREV CUR MEDS BY ELIG CLIN: HCPCS | Performed by: NEUROLOGICAL SURGERY

## 2022-12-02 PROCEDURE — G8484 FLU IMMUNIZE NO ADMIN: HCPCS | Performed by: NEUROLOGICAL SURGERY

## 2022-12-02 PROCEDURE — 99204 OFFICE O/P NEW MOD 45 MIN: CPT | Performed by: NEUROLOGICAL SURGERY

## 2022-12-02 PROCEDURE — 1036F TOBACCO NON-USER: CPT | Performed by: NEUROLOGICAL SURGERY

## 2022-12-02 PROCEDURE — G8400 PT W/DXA NO RESULTS DOC: HCPCS | Performed by: NEUROLOGICAL SURGERY

## 2022-12-02 PROCEDURE — 1123F ACP DISCUSS/DSCN MKR DOCD: CPT | Performed by: NEUROLOGICAL SURGERY

## 2022-12-02 PROCEDURE — 99202 OFFICE O/P NEW SF 15 MIN: CPT

## 2022-12-02 PROCEDURE — 3074F SYST BP LT 130 MM HG: CPT | Performed by: NEUROLOGICAL SURGERY

## 2022-12-02 PROCEDURE — 3078F DIAST BP <80 MM HG: CPT | Performed by: NEUROLOGICAL SURGERY

## 2022-12-02 RX ORDER — VIT C/B6/B5/MAGNESIUM/HERB 173 50-5-6-5MG
CAPSULE ORAL DAILY
COMMUNITY

## 2022-12-02 RX ORDER — GABAPENTIN 300 MG/1
300 CAPSULE ORAL NIGHTLY
Qty: 90 CAPSULE | Refills: 1 | Status: SHIPPED | OUTPATIENT
Start: 2022-12-02 | End: 2023-05-31

## 2022-12-02 NOTE — PROGRESS NOTES
[facility] NEUROSURGERY  [facility] [address]  [phone]       Chief Complaint:   Chief Complaint   Patient presents with    Consultation     Low back pain radiating down right leg with numbness in foot. Aggravated by standing, improved with bending, ice, Ibuprofen and tylenol. No PT or SAMUEL. HPI:     I had the pleasure of seeing Dimple eLone today in neurosurgical clinic. 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 She states that sometime back in July she started to notice a different back pain and with radiculopathy down the right lateral aspect of her thigh and leg to the ankle but not beyond. Intermittently the left side will likewise do the same. She states that standing for prolonged periods of time causes her right toes to go numb. She finds that bending forward alleviates her pain. There is no loss of bowel or bladder function. Of note she has had a history of endometrial CA. Past Medical History:   Diagnosis Date    Babesiosis 2009    tick disease; treated    Endometrial cancer ([facility] [address] 11/21 2019 approx    new dx    Hyperlipidemia     Sleep apnea      Past Surgical History:   Procedure Laterality Date    BUNIONECTOMY  2006 2007    bilateral    CARPAL TUNNEL RELEASE Bilateral     COLONOSCOPY      COLONOSCOPY N/A 12/4/2019    COLONOSCOPY WITH BIOPSY performed by Nilsa Archer MD at . [place] [address]  2010    right hand    FINGER TRIGGER RELEASE Left 12/18/2018    left ring finger.     HYSTERECTOMY (CERVIX STATUS UNKNOWN)  12/17/2019    [facility]    INJECTION Right 06/2021    Patient had right knee injection    OTHER SURGICAL HISTORY Right 7/3/15    excision mass right upper arm    PORT SURGERY N/A 2/11/2020    INSERTION OF A POWER PORT performed by Vanessa Harrington MD at [address] [place] [address]      Family History   Problem Relation Age of Onset    Other Mother     Other Father       Social History     Socioeconomic History    Marital status:      Spouse name: Not on file    Number of children: Not on file    Years of education: Not on file    Highest education level: Not on file   Occupational History    Occupation: retired-nurse   Tobacco Use    Smoking status: Never    Smokeless tobacco: Never   Vaping Use    Vaping Use: Never used   Substance and Sexual Activity    Alcohol use:  Yes     Alcohol/week: 0.0 standard drinks     Comment: rarely    Drug use: No    Sexual activity: Not Currently   Other Topics Concern    Not on file   Social History Narrative    Not on file     Social Determinants of Health     Financial Resource Strain: Low Risk     Difficulty of Paying Living Expenses: Not hard at all   Food Insecurity: No Food Insecurity    Worried About Running Out of Food in the Last Year: Never true    Ran Out of Food in the Last Year: Never true   Transportation Needs: Not on file   Physical Activity: Insufficiently Active    Days of Exercise per Week: 2 days    Minutes of Exercise per Session: 40 min   Stress: Not on file   Social Connections: Not on file   Intimate Partner Violence: Not on file   Housing Stability: Not on file       Medications:   Current Outpatient Medications   Medication Sig Dispense Refill    turmeric 500 MG CAPS Take by mouth daily      Misc Natural Products (GLUCOSAMINE CHOND COMPLEX/MSM) TABS Take 1 tablet by mouth daily      Krill Oil (OMEGA-3) 500 MG CAPS Take 500 mg by mouth daily      Ascorbic Acid (VITAMIN C) 1000 MG tablet Take 1,000 mg by mouth daily      acetaminophen (TYLENOL) 325 MG tablet Take 650 mg by mouth every 6 hours as needed for Pain      vitamin D-3 (CHOLECALCIFEROL) 5000 UNITS TABS Take 5,000 Units by mouth daily Last dose 2-6-20      Magnesium 500 MG CAPS Take by mouth every evening Last dose 2-6-20      aspirin 81 MG EC tablet Take 81 mg by mouth daily Per PCP; last dose 2-6-20       No current facility-administered medications for this visit. Allergies:    Azmacort [triamcinolone], Doxycycline, Mepron [atovaquone], and Zithromax [azithromycin]       Review of Systems:    Denies any chest pain, headache, dyspnea, recent weight loss, fevers, chills or night sweats. Physical Examination:    BP (!) 187/71   Pulse 70   Ht 5' (1.524 m)   Wt 118 lb (53.5 kg)   SpO2 97%   BMI 23.05 kg/m²      On focused neurological examination, she  is awake alert oriented and rationally conversant. Speech is clear and fluent. Pupils are equal and reactive to light bilaterally, extraocular movements are intact, visual fields are full to confrontation. Her  face is symmetric and grossly intact to fine touch. Uvula and tongue are both midline. Shoulder shrug is symmetric and strong. Cervical spine is well aligned and nontender to direct palpation. Motor examination reveals preserved power in the upper and lower extremities at 5 out of 5 throughout. Reflexes are symmetric and brisk. Plantar responses are downgoing. There is no clonus. Patient is intact to fine touch in all dermatomes throughout. Palpation of the spine reveals no kyphotic or scoliotic gross deformities. There is no pain to deep percussion nor palpation. ASSESSMENT:    I personally reviewed Caroline Sierra radiographic images, particularly her MRI of the lumbar spine dated 11 November 2022 which demonstrates significant lumbar spinal stenosis most pronounced at L4-5. There is what appears to be likely hemangioma in the body of 5.    1. Spinal stenosis of lumbar region with neurogenic claudication  -     XR LUMBAR SPINE FLEXION AND EXTENSION ONLY;  Future  -     Alexis MD Hung, Pain Medicine, [address] [facility] DECISION MAKING & PLAN:    I showed the patient and her sister present today as advocate her images and explained the findings. Certainly conservative approach at this juncture can be tried with pain management and physical therapy and both referrals were provided for her. She is scheduled to have a PET scan on the 17th and I would like to see the results of this as well to ensure that indeed this is a hemangioma and does not represent remote occult metastatic lesion. I have also ordered flexion-extension x-rays to ensure that occult listhesis is not contributing to this woman's complaints. She will return to clinic with the above results in hand including PET scan results and a trial of pain management and physical therapy. Should she worsen or develop new neurological symptoms I would be happy to see her sooner in clinic. Thank you so much for allowing us to participate in the care of this patient. Return in about 6 weeks (around 1/13/2023). Electronically signed by Alex Pineda MD on 12/2/2022 at 12:24 PM       NOTE: This report was transcribed using voice recognition software.  Every effort was made to ensure accuracy; however, inadvertent computerized transcription errors may be present

## 2022-12-14 ENCOUNTER — EVALUATION (OUTPATIENT)
Dept: PHYSICAL THERAPY | Age: 77
End: 2022-12-14

## 2022-12-14 DIAGNOSIS — M48.062 SPINAL STENOSIS OF LUMBAR REGION WITH NEUROGENIC CLAUDICATION: Primary | ICD-10-CM

## 2022-12-14 NOTE — PROGRESS NOTES
[facility]          Phone: [phone] Fax: [phone]    Physical Therapy Daily Treatment Note  Date:  2022    Patient Name:  Yeimi Nur    :  [id number]  MRN: [id number]    Evaluating therapist: Ayleen Adame PT, DPT  [id number]    Restrictions/Precautions:      Diagnosis:     Diagnosis Orders   1.  Spinal stenosis of lumbar region with neurogenic claudication          Treatment Diagnosis:    Insurance/Certification information:  [employer], [employer]  Referring Physician:  Ara Spence MD  Plan of care signed (Y/N):    Visit# / total visits:    Pain level: 0/10   Time In:  1000  Time Out:  1040    Subjective:  See initial eval    Exercises:  Exercise/Equipment Resistance/Repetitions Other comments            Supine hamstring stretch 30 sec x2 ea LE PFB     Supine piriformis stretch 30 sec x2 ea LE     TRA bracing 10x 5 sec hold             SLR with TRA       Bridges       Seated ball marches with TRA       S/L hip abduction with TRA         Lifting activities with mechanics training                                                                       Other Therapeutic Activities:  Pt tolerated TE's reports most pronounced stretching with piriformis stretch, difficulty isolating TRA with supine positioning; recommended trial in sitting as part of HEP    Home Exercise Program:  supine hamstring stretch, piriformis stretch (supine or sitting), TRA btracing    Manual Treatments:  N/A    Modalities:  N/A     Time-in Time-out Total Time   51594  Evaluation Low Complexity 1000 1020 20   96019  Evaluation Med Complexity      74105  Evaluation High Complexity      86742  Ther Ex 1020 1040 20   19810  Neuro Re-ed        25995  Ther Activities        06544  Manual Therapy       22498  E-stim       52963  Ultrasound            Session 1000 1040 40       Treatment/Activity Tolerance:  [x] Patient tolerated treatment well [] Patient limited by fatigue  [] Patient limited by pain  [] Patient limited by other medical complications  [] Other:     Prognosis: [x] Good [] Fair  [] Poor    Patient Requires Follow-up: [x] Yes  [] No    Plan:   [x] Continue per plan of care [] Alter current plan (see comments)  [] Plan of care initiated [] Hold pending MD visit [] Discharge    Plan for Next Session:  progress core stability as tolerated      Electronically signed by:  Merlinda Saner, PT, DPT  [id number]

## 2022-12-14 NOTE — PROGRESS NOTES
[facility]   Phone: [phone]   Fax: [phone]           Date:  2022   Patient: Kim Lozano  :   MRN: [id number]  Referring Provider: Ankita Peñaloza MD  [address] [place] [address]. [place],  [address] [place] [address]     Medical Diagnosis:      Diagnosis Orders   1. Spinal stenosis of lumbar region with neurogenic claudication            SUBJECTIVE:     Onset date: End     Onset: Insidious onset    Mechanism of Injury: No identified injury. Pt reports cleaning out dirt from [place]; seated on stool and spreading rubber mulch over summer -- but does not remember specific incident. Previous PT: none    Medical Management for Current Problem:  regular chiropractic interventions. Has used TENS unit with minimal relief. Chief complaint: pain, difficulty with static standing    Behavior: condition is getting worse    Pain: constant  Current: 0/10     Best: 0/10     Worst:7/10    Symptom Type/Quality: sharp  Location[de-identified] Back: B lumbar region-- radiates laterally down B LE and wraps to anterior shin to calves and into toes; cold to touch and sensation of coolness of toes. Provoking Activities/Positions: standing-- worse with standing still >5 min, rolling over in bed                 Relieving Activitie/Positions: sitting-- semi-firm chair    Disturbed Sleep: no  Bladder Dysfunction: no  Bowel Dysfunction: no     Imaging results: No results found.     Past Medical History:  Past Medical History:   Diagnosis Date    Babesiosis 2009    tick disease; treated    [facility] 2019 approx    new dx    Hyperlipidemia     Sleep apnea      Past Surgical History:   Procedure Laterality Date    BUNIONECTOMY   2007    bilateral    CARPAL TUNNEL RELEASE Bilateral     COLONOSCOPY      COLONOSCOPY N/A 2019    COLONOSCOPY WITH BIOPSY performed by Sheree Jimenez MD at . [place] [id number]      right hand    FINGER TRIGGER RELEASE Left 12/18/2018    left ring finger. HYSTERECTOMY (CERVIX STATUS UNKNOWN)  12/17/2019    [facility]    INJECTION Right 06/2021    Patient had right knee injection    OTHER SURGICAL HISTORY Right 7/3/15    excision mass right upper arm    PORT SURGERY N/A 2/11/2020    INSERTION OF A POWER PORT performed by Caitlyn Montaño MD at [address] [place] [address]       Medications:   Current Outpatient Medications   Medication Sig Dispense Refill    turmeric 500 MG CAPS Take by mouth daily      gabapentin (NEURONTIN) 300 MG capsule Take 1 capsule by mouth nightly for 180 days. Intended supply: 90 days 90 capsule 1    Misc Natural Products (GLUCOSAMINE CHOND COMPLEX/MSM) TABS Take 1 tablet by mouth daily      Krill Oil (OMEGA-3) 500 MG CAPS Take 500 mg by mouth daily      Ascorbic Acid (VITAMIN C) 1000 MG tablet Take 1,000 mg by mouth daily      acetaminophen (TYLENOL) 325 MG tablet Take 650 mg by mouth every 6 hours as needed for Pain      vitamin D-3 (CHOLECALCIFEROL) 5000 UNITS TABS Take 5,000 Units by mouth daily Last dose 2-6-20      Magnesium 500 MG CAPS Take by mouth every evening Last dose 2-6-20      aspirin 81 MG EC tablet Take 81 mg by mouth daily Per PCP; last dose 2-6-20       No current facility-administered medications for this visit. Occupation: retired nurse Physical demands include:  N/A . Status:  N/A    Exercise regimen: walking    Hobbies: yard work, sewing    Patient Goals: pain relief, get back to normal, learn how to manage condition , be able to spend time and play with great granddaughter. Contraindications/Precautions:  hx uterine cancer in 2019 s/p hysterectomy. OBJECTIVE:     Observations: well nourished female    Inspection: normal orthopedic exam         Gait: normal    Functional Strength:  WNL    Range of Motion:    Trunk:    Flexion:  [x] Normal   [] Limited 95*   Extension:  [] Normal   [x] Limited 10*    Right Rotation: [x] Normal   [] Limited    Left Rotation:  [x] Normal   [] Limited    Right Side Bending: [x] Normal   [] Limited 28*   Left Side Bending: [x] Normal   [] Limited 28*    Lower Extremity:   Right:   [x] Normal   [] Limited    Left:   [x] Normal   [] Limited       Strength:     Trunk: adequate ROM, decreased strength   R LE: 4/5   L LE: 4/5    Palpation: No tenderness     Sensation: intact to light touch and temperature to exam; reports sensation of paraesthesias    Special Tests:   [] Nerve Root Compression           Right []+ / [] -    Left []+ / [] -  [] Slump           Right []+ / [] -    Left []+ / [] -  [] FADIR          Right []+ / [] -    Left []+ / [] -  [] S-I Distraction          Right []+ / [] -    Left []+ / [] -     [] SLR           Right []+ / [] -    Left []+ / [] -     [] CARLIE          Right []+ / [] -    Left []+ / [] -  [] S-I Compression          Right []+ / [] -    Left []+ / [] -   [] Leg Length: []+ / [] -       Special Test Comments: + SLR with pain in lumbar bilaterally. ASSESSMENT     Outcome Measure:   Modified Oswestry 36% disability    Problems:   Pain reported 0-7/10  ROM decreased in lumbar extension  Strength decreased in core stabilizers, slightly in B LEs  Decreased functional ability with walking, standing, lifting, carrying    Reason for Skilled Care: Pt presents to therapy with back pain persisting several months with B LE radiculopathy. Pt will benefit from skilled PT services to improve ROM, core stability, lifting mechanics, and pain management techniques. [x] There are no barriers affecting plan of care or recovery    [] Barriers to this patient's plan of care or recovery include.     Domestic Concerns:  [x] No  [] Yes:    Long Term goals (4-6 weeks)  Decrease reported pain to 0-2/10  Increase ROM to 15* lumbar extension  Increase Strength to 5/5 B LE   Able to perform/complete the following functions/tasks: stand >10 min with back pain <3/10, play with great granddaughter >45 minutes with back pain <3/10  Oswestry Low Back Disability Questionnaire 22% disability   Independent with Home Exercise Programs    Rehab Potential: [x] Good  [] Fair  [] Poor    PLAN       Treatment Plan:   [x] Therapeutic Exercise  [x] Therapeutic Activity  [x] Neuromuscular Re-education   [x] Gait Training  [x] Balance Training  [] Aerobic conditioning  [x] Manual Therapy  [] Massage/Fascial release   [] Work/Sport specific activities    [] Pain Neuroscience [x] Cold/hotpack  [] Vasocompression  [x] Electrical Stimulation  [] Lumbar/Cervical Traction  [] Ultrasound   [] Iontophoresis: 4 mg/mL Dexamethasone Sodium Phosphate 40-80 mAmin  [] Dry Needling      [x] Instruction in HEP      []  Medication allergies reviewed for use of Dexamethasone Sodium Phosphate 4mg/ml  with iontophoresis treatments. Patient is not allergic. The following CPT codes are likely to be used in the care of this patient: 00847 PT Evaluation: Low Complexity, 66296 PT Re-Evaluation, 81419 Therapeutic Exercise, 25853 Neuromuscular Re-Education, 81403 Therapeutic Activities, 15370 Manual Therapy, 53500 Gait Training, and 45933 Electric Stimulation      Suggested Professional Referral: [x] No  [] Yes:     Patient Education:  [x] Plans/Goals, Risks/Benefits discussed  [x] Home exercise program  Method of Education: [x] Verbal  [x] Demo  [x] Written  Comprehension of Education:  [x] Verbalizes understanding. [x] Demonstrates understanding. [] Needs Review. [] Demonstrates/verbalizes understanding of HEP/Ed previously given. Frequency:  1-2 days per week for 4-6 weeks    Patient understands diagnosis/prognosis and consents to treatment, plan and goals: [x] Yes    [] No     Thank you for the opportunity to work with your patient. If you have questions or comments, please contact me at numbers listed above. Electronically signed by: Tosin Gaytan, PT, DPT  [id number]    Medicare Patients Only     Please sign Physician's Certification and return to:   [facility] [address] [place] [address] PHYSICAL THERAPY  [address] [place] [phone]  Dept: [phone]  Dept Fax: [phone]  Loc: [phone] Certification / Comments     Frequency/Duration 1-2 days per week for 4-6 weeks. Certification period from 12/14/2022  to 3/24/2023. I have reviewed the Plan of Care established for skilled therapy services and certify that the services are required and that they will be provided while the patient is under my care.     Physician's Comments/Revisions:               Physician's Printed Name:                                           [de-identified] Signature:                                                               Date:

## 2022-12-27 ENCOUNTER — OFFICE VISIT (OUTPATIENT)
Dept: PAIN MANAGEMENT | Age: 77
End: 2022-12-27

## 2022-12-27 VITALS
HEART RATE: 65 BPM | DIASTOLIC BLOOD PRESSURE: 75 MMHG | TEMPERATURE: 98.1 F | WEIGHT: 120 LBS | BODY MASS INDEX: 23.56 KG/M2 | HEIGHT: 60 IN | SYSTOLIC BLOOD PRESSURE: 166 MMHG | OXYGEN SATURATION: 98 %

## 2022-12-27 DIAGNOSIS — Z85.42 HISTORY OF ENDOMETRIAL CANCER: ICD-10-CM

## 2022-12-27 DIAGNOSIS — M48.062 SPINAL STENOSIS OF LUMBAR REGION WITH NEUROGENIC CLAUDICATION: Primary | ICD-10-CM

## 2022-12-27 DIAGNOSIS — M54.16 LUMBAR RADICULOPATHY: ICD-10-CM

## 2022-12-27 RX ORDER — SODIUM CHLORIDE 9 MG/ML
INJECTION, SOLUTION INTRAVENOUS PRN
OUTPATIENT
Start: 2022-12-27

## 2022-12-27 RX ORDER — SODIUM CHLORIDE 0.9 % (FLUSH) 0.9 %
5-40 SYRINGE (ML) INJECTION PRN
OUTPATIENT
Start: 2022-12-27

## 2022-12-27 RX ORDER — SODIUM CHLORIDE 0.9 % (FLUSH) 0.9 %
5-40 SYRINGE (ML) INJECTION EVERY 12 HOURS SCHEDULED
OUTPATIENT
Start: 2022-12-27

## 2022-12-27 NOTE — PROGRESS NOTES
[id number]  [address]  SHAVON CARLSON [facility] - BEHAVIORAL HE[facility] SERVICES, [facility]  Pain Medicine Consult Note    Patient:  DONALD Gunn [id number]  Date of Service:  22  Requesting Physician:  Laura Gr MD  Chief Complaint: New Patient      HISTORY OF PRESENT ILLNESS:      Ms. Dheeraj Euceda is a 68 y.o. female presented today for evaluation of  low back pain and b/l Leg pain  that has been ongoing for the past 6 months     She   has a past medical history of Babesiosis, Endometrial cancer s/p CHERIE BSO 2019 h/o chemotherapy  (6months) c/b chemotherapy induced neuropathy, Hyperlipidemia, and Sleep apnea. She is not diabetic. Seen by NSG (dr. Constance Aj) and discussed MRI, ordered FlexEX, PT and referred to us. Recently seen by Dr. Soliman Faster [facility]) and reviewed PET scan and was told no evidence of FDG disease. Pain is constant and is described as aching, shooting, and sharp. She rates the pain as a 8/10 on her worst day , 0/10 on her best day, and a 7/10 on average on the VAS scale. Pain does radiate to both lower extremities to the toes. She  has tingling, weakness of the both lower extremities. Alleviating factors include: ice and rest.  Aggravating factors include:  movement. She states that the pain does not keep her from sleeping at night. She took her last dose of Neurontin last night. She is  on NSAIDS and  is not on anticoagulation medications to include none and is managed by none. Patient does not have bladder or bowel dysfunction.         Pain causes functional limitations/ limits Adl's : No       Previous treatments:      Physical Therapy : yes, just started     Chiropractic treatment: yes, recent visit, no relief recently    Current Medications:  NSAID's : yes - ibuprofen    Membrane stabilizers : yes - Gabapentin 300 mg QHS    Opioids : no   Adjuvants or Others : no    TENS Unit: no   Surgeries: no   Interventional Pain procedures/ nerve blocks: yes, Right Knee IA injection      Current Medications:   Glucosamine Chond Complex/MSM, Magnesium, Omega-3, acetaminophen, aspirin, gabapentin, ibuprofen, turmeric, vitamin C, and vitamin D-3     Social History:  Work Hx: Retired - RN   Currently in Litigation: no    H/O Smoking: no   H/O alcohol abuse : no   H/O Illicit drug use : no   Social History     Substance and Sexual Activity   Drug Use No        Personal Expectations from this treatment:  Decrease pain and/or swelling Pain control   Opioid Agreement:   Renewal date:      Last Urine Screen:   No results found for: Ilana Points, Ace Viry, PHENCYCLIDINESCREENURINE, LABMETH, Larraine Raspberry, [address]    Imagin2022 PET/CT   IMPRESSION:   Head and Neck:   * No evidence of FDG avid neoplastic process   Chest:   * No evidence of FDG avid neoplastic process   Abdomen and pelvis:   * No evidence of FDG avid neoplastic process   Musculoskeletal:   * No neoplastic hypermetabolic lesions. Further evaluation of the findings on the 2022 bone scan, and reported lumbar spine abnormality may be performed, as clinically indicated. 12/15/2022 XR LUMBAR SPINE FLEXION AND EXTENSION ONLY ([facility])  IMPRESSION:  No fracture. Grade 1 spondylolisthesis again seen at L3 over L4, whencompared to the previous radiographs performed 10/19/2022 and MRI of thelumbar spine performed 2022. There is some instability, as describedabove. Mild osteo-degenerative changes again noted throughout the lumbar  spine, as well as mild discogenic disc disease at L3-4. MRI Result (most recent): MRI LUMBAR SPINE WO CONTRAST 2022    Narrative  EXAMINATION:  MRI OF THE LUMBAR SPINE WITHOUT CONTRAST, 2022 1:45 pm    TECHNIQUE:  Multiplanar multisequence MRI of the lumbar spine was performed without the  administration of intravenous contrast.    COMPARISON:  None.     HISTORY:  ORDERING SYSTEM PROVIDED HISTORY: Chronic bilateral low back pain without  sciatica  TECHNOLOGIST PROVIDED HISTORY:  Reason for exam:->low back pain failed 12 weeks PT. OMT, heat. OTC meds  tylenol and ibuprofen  What is the sedation requirement?->None  What reading provider will be dictating this exam?->CRC    FINDINGS:  BONES/ALIGNMENT:  No fracture or joint dislocation. In the L5 vertebral  body, there is an area of signal abnormality, which measures approximately  1.8 x 1.3 cm and is predominantly T1 hypointense and T2 hyperintense. The  remainder of the marrow signal is unremarkable. SPINAL CORD: The conus terminates normally. SOFT TISSUES: No paraspinal mass identified. L1-L2: Disc desiccation with a small disc bulge. Mild facet hypertrophy. No  significant central canal, lateral recess or neural foraminal stenoses. L2-L3: Disc desiccation with a small disc bulge. Mild facet and ligamentous  hypertrophy. No significant central canal or lateral recess stenosis. Mild  neural foraminal stenoses. L3-L4: Grade 1 anterolisthesis of L3 over L4 by 6 mm. Mild loss of disc  height and a disc bulge. Mild facet and ligamentous hypertrophy. Moderate  to severe central canal stenosis, with narrowing of the AP diameter of the  thecal sac in the midsagittal plane to 6.5 mm. Moderate lateral recess and  neural foraminal stenoses. L4-L5: Mild loss of disc height with a left-sided disc bulge. Severe central  canal stenosis, with narrowing of the AP diameter of the thecal sac in the  midsagittal plane to 5 mm. Moderate to severe right and severe left lateral  recess stenoses. Moderate neural foraminal stenoses. L5-S1: Disc desiccation with mild loss of disc height and a disc bulge. Mild  facet and ligamentous hypertrophy. No significant central canal stenosis. Mild-to-moderate lateral recess stenoses. Moderate to severe neural  foraminal stenoses. Impression  1. No fracture or joint dislocation.   2. Nonspecific predominantly T1 hypointense focus in the L5 vertebral body is  of an unclear etiology. It may be of a benign etiology (atypical, lipid poor  hemangioma) or new metastatic lesion. Further evaluation with bone scan is  recommended. 3. Severe central canal stenosis at L4-5. Moderate to severe stenosis at  L3-4.  4.  Multilevel neural foraminal stenoses, worst (moderate to severe) at L5-S1.  5. Multilevel lateral recess stenoses, worse (moderate to severe) at the left  L4-5 level. RECOMMENDATIONS:  Unavailable      CT Result (most recent):  CT ABDOMEN PELVIS WO CONTRAST     Narrative  \"\"  Exam- CT of the abdomen and pelvis without contrast.    Comparison- None. History- Low back pain, left lower quadrant pain. Findings- No evidence of bowel obstruction, free air, or free fluid in  the abdomen and pelvis. Large amount of stool present throughout the  colon. No retroperitoneal lymphadenopathy. No renal or ureteric calculus. No hydronephrosis. The liver,  gallbladder, pancreas, and spleen are unremarkable. Bilateral adrenal  glands are unremarkable. Urinary bladder is nondistended. Appendix is  visualized and unremarkable. View of the lung bases shows no airspace opacities or pleural effusion. Impression-  1. No evidence of bowel obstruction, free air, or free fluid in the  abdomen or pelvis. 2. No renal or ureteric calculus. 3. Large amount of stool throughout the colon. If there are any physician concerns regarding this report, a  Radiologist can be reached by calling the following number [phone]. - RXM  Read Aleksandra RUIZ Released ByShital RUIZ   Released Date Time- 06/20/13 1014  ------------------------------------------------------------------------------  # CTs in 12 months  - 0      Pertinent Labs:   Lab Results   Component Value Date/Time    BUN 17 10/19/2022 12:00 PM    CREATININE 0.8 10/19/2022 12:00 PM    GLUCOSE 100 10/19/2022 12:00 PM AST 31 10/19/2022 12:00 PM    ALT 28 10/19/2022 12:00 PM    LABA1C 5.7 10/19/2022 12:00 PM     10/19/2022 12:00 PM       Past Medical History:   Diagnosis Date    Arthritis     Babesiosis 2009    tick disease; treated    Endometrial cancer ([facility] [address] 11/21 2019 approx    new dx    Hyperlipidemia     Sleep apnea        Past Surgical History:   Procedure Laterality Date    BUNIONECTOMY  2006 2007    bilateral    CARPAL TUNNEL RELEASE Bilateral     COLONOSCOPY      COLONOSCOPY N/A 12/4/2019    COLONOSCOPY WITH BIOPSY performed by Scotty Arteaga MD at . [facility]  2010    right hand    FINGER TRIGGER RELEASE Left 12/18/2018    left ring finger. HYSTERECTOMY (CERVIX STATUS UNKNOWN)  12/17/2019    [facility]    INJECTION Right 06/2021    Patient had right knee injection    OTHER SURGICAL HISTORY Right 7/3/15    excision mass right upper arm    PORT SURGERY N/A 2/11/2020    INSERTION OF A POWER PORT performed by Maximilian Almazan MD at [address] [place] [address]       Prior to Admission medications    Medication Sig Start Date End Date Taking? Authorizing Provider   ibuprofen (ADVIL;MOTRIN) 100 MG/5ML suspension  11/5/22  Yes Historical Provider, MD   turmeric 500 MG CAPS Take by mouth daily   Yes Historical Provider, MD   gabapentin (NEURONTIN) 300 MG capsule Take 1 capsule by mouth nightly for 180 days.  Intended supply: 90 days 12/2/22 5/31/23 Yes Alex Pineda MD   Misc Natural Products (GLUCOSAMINE CHOND COMPLEX/MSM) TABS Take 1 tablet by mouth daily   Yes Historical Provider, MD   acetaminophen (TYLENOL) 325 MG tablet Take 650 mg by mouth every 6 hours as needed for Pain   Yes Historical Provider, MD   vitamin D-3 (CHOLECALCIFEROL) 5000 UNITS TABS Take 5,000 Units by mouth daily Last dose 2-6-20   Yes Historical Provider, MD   Magnesium 500 MG CAPS Take by mouth every evening Last dose 2-6-20   Yes Historical Provider, MD Santoro Latha Oil (OMEGA-3) 500 MG CAPS Take 500 mg by mouth daily  Patient not taking: Reported on 12/27/2022    Historical Provider, MD   Ascorbic Acid (VITAMIN C) 1000 MG tablet Take 1,000 mg by mouth daily  Patient not taking: Reported on 12/27/2022    Historical Provider, MD   aspirin 81 MG EC tablet Take 81 mg by mouth daily Per PCP; last dose 2-6-20  Patient not taking: Reported on 12/27/2022    Historical Provider, MD       Allergies   Allergen Reactions    Azmacort [Triamcinolone] Swelling    Doxycycline Rash    Mepron [Atovaquone] Rash    Zithromax [Azithromycin] Rash       Social History     Tobacco Use    Smoking status: Never    Smokeless tobacco: Never   Vaping Use    Vaping Use: Never used   Substance Use Topics    Alcohol use: Yes     Alcohol/week: 0.0 standard drinks     Comment: rarely    Drug use: No       family history includes Other in her father and mother. REVIEW OF SYSTEMS:   Patient specifically denies fever/chills, chest pain, shortness of breath, new bowel or bladder complaints. All other review of systems was negative except as noted above. PHYSICAL EXAMINATION:    BP (!) 166/75   Pulse 65   Temp 98.1 °F (36.7 °C) (Infrared)   Ht 4' 11.5\" (1.511 m)   Wt 120 lb (54.4 kg)   SpO2 98%   BMI 23.83 kg/m²     General:  Pleasant in no distress and A & O x 3, Build:Normal Weight, Function: Rises from seated position easily     HEENT:normocephalic, atraumatic, Pupils regular, round, equal Sclera: icterus absent      Lungs: Breathing:normal breath pattern, unlabored    CVS: RRR, pulses intact b/l    Abdomen: non-distended and normal Non tender, no guarding. Cervical spine: Inspection: normal   Palpation: No tenderness over the midline and paraspinal area, bilaterally   Range of motion: Normal flexion, extension, rotation bilaterally and is not painful.   Spurling's: negative bilaterally   Hernández's: negative bilaterally     Thoracic spine: Inspection: normal   Palpation:No tenderness over the midline and paraspinal area, bilaterally  Range of motion: normal in flexion, extension rotation bilateral and is not painful. Lumbar spine: Inspection: normal   Spine Range of motion: Normal, flexion Normal, Lateral bending, extension and rotation bilaterally reduced is somewhat painful. Palpation:tenderness paravertebral muscles and midline tenderness. Facet Loading: left-negative and right-negative.     Musculoskeletal:  Sacroiliac joint tenderness No bilaterally   CARLIE test: negative bilaterally   Gaenslen's test:negative bilaterally  Piriformis tenderness: negative bilaterally   SLR : negative bilaterally   Trochanteric bursa tenderness: negative bilaterally   CVA tenderness: No      Extremities:  Tremors: None bilaterally upper and lower   Range of motion:  grossly normal  Shoulders: Generally normal shoulders ,    Hips: no pain with internal rotation of hips   Varicose veins: absent    Pulses: present Lt radial   Cyanosis: none   Edema: none x all 4 extremities     Neurological: Sensory:normal to light touch   , CN 2-12 grossly intact \    MOTOR Left (x/5) Right (x/5)   Shoulder Abduction (C5)  5 5   Biceps - Elbow Flexion (C6)  5 5   Triceps - Elbow Extension (C7)  5 5   Hand  (C8)  5 5   Iliopsoas - Hip flex /Abd (L2)  5 5   Quadriceps - Knee Ext (L3)  5 5   Ant Tibialis - Ankle Dorsiflex (L4)  5 5   Post Tibialis - Hip Ext/Abd Foot dorsiflex (L5)  5 5   Gastrocnemius - Plantar flex (S1)  5 5     REFLEXES Left Right   Brachioradialis (C5/6)  2+ 2+   Biceps (C5/6)  2+ 2+   Triceps (C7)  2+ 2+   Quadriceps / Patellar (L4)  2+ 2+   Achilles (S1)  2+ 2+     Gait:normal    Dermatology: Skin:no unusual rashes    Impression:  Assessment/Plan:  Diagnoses and all orders for this visit:  Lumbar radiculopathy  Spinal stenosis of lumbar region with neurogenic claudication  History of endometrial cancer    68 y.o. female with h/o   Babesiosis, Endometrial cancer s/p CHERIE BSO 2019 h/o chemotherapy  (6months) c/b chemotherapy induced neuropathy, Hyperlipidemia, and Sleep apnea who presents with 6-month history of chronic low back pain and now radiating bilaterally to the toes. She states that the pain is associated with occasional numbness and tingling, and the pain in the back is equal to the legs. The pain is worse with lumbar extension and improved with lumbar flexion or sitting. She does have positive shopping cart sign and does have worsening pain with prolonged walking or standing. MRI lumbar spine showed severe central canal stenosis at L4-5 with moderate to severe at L3-4, along with multilevel neuroforaminal stenosis worst at L5-S1 and L4-5. There is also a grade 1 anterior listhesis seen on L3 over L4. Fluid was also noted at multiple levels in the facet joints there was also noted a T1 hypointense focus in the L5 vertebra, hemangioma versus mets. PET scan was done and showed was negative for new FGD avid neoplastic disease. Physical exam was significant for tenderness in the lumbar midline as well as some tenderness over the bilateral paraspinals, negative straight leg raise bilaterally, intact sensation, strength, reflexes. Differential diagnosis includes spinal stenosis with neurogenic claudication, lumbar radiculopathy, lumbar spondylosis, myofascial pain. In regards to her treatment plan, she will continue to see physical therapy as she has just recently started. She is currently taking gabapentin nightly 300 mg and this can be increased to twice daily as tolerated. We did discuss side effects of increasing the frequency of the medication and she understands. She can also continue to take ibuprofen OTC as needed within limits. From an interventional point of view we will schedule her for a L4-5 LESI given her symptoms and MRI findings. R/b/a was discussed with her and she is willing to proceed. She will have to hold aspirin and ibuprofen for the procedure.   She does have a history of procedural anxiety in the past and she can have oral Valium prior to the procedure. Following the procedure we will see her back 4 weeks and determine next steps. She is understanding of this treatment plan and wishes to move forward. Plan:   Therapy:  Continue PT  Imaging: no new  Medications: can increase gabapentin 300mg to BID as tolerated   Interventions: Schedule for L4/5 LESI - r/b/a discussed. Hold ASA/ibup. Can have valium preop for anxiety   Consults: no  Follow up: 4 weeks post procedure  Urine screen today: no   Previous Records/Imaging: Obtain full pertinent and past medical records, including Imaging CDs and radiology reports. Counseling :  Patient encouraged to stay active and to watch/lose weight   Encouraged to continue Regular home exercise program as tolerated - stretching / strengthening. Smoking cessation counseling : no   Treatment plan discussed with the patient including medication and procedure side effects. We discussed with the patient that combining opioids, benzodiazepines, alcohol, illicit drugs or sleep aids increases the risk of respiratory depression including death. We discussed that these medications may cause drowsiness, sedation or dizziness and have counseled the patient not to drive or operate machinery. We have discussed that these medications will be prescribed only by one provider. We have discussed with the patient about age related risk factors and have thoroughly discussed the importance of taking these medications as prescribed. The patient verbalizes understanding. I spent a total of 49 minutes on the date of the service which included preparing to see the patient, face-to-face patient care, completing clinical documentation, obtaining and/or reviewing separately obtained history, performing a medically appropriate exam, counseling and educating the patient/family/caregiver and ordering medications, tests, or procedures.     NOTE: The above documentation was prepared using voice recognition software. Every attempt was made to ensure accuracy but there may be spelling, grammatical, and contextual errors. Ky Neff MD    Controlled Substances Monitoring:   No flowsheet data found. OARRS report reviewed 12/27/22   CC: Edvin Gutierrez MD  [address] [facility]. [facility],  [address] [place] [address]   Tom Good MD   [address] [facility] [place].  [address]  [facility] [address]

## 2022-12-27 NOTE — PROGRESS NOTES
Patient:  Reynaldo Schwartz,    Date of Service:  22      Do you currently have any of the following:    Fever: No  Headache:  No  Cough: No  Shortness of breath: No  Exposed to anyone with these symptoms: No       Patient presents to [facility] with complaints of in lower back and bilateral lower extremities pain that started 6 months ago and has been getting worse. She states the pain began following No specific cause    Pain is constant and is described as aching, shooting, and sharp. She rates the pain as a 8/10 on her worst day , 0/10 on her best day, and a 7/10 on average on the VAS scale. Pain does radiate to both lower extremities. She  has tingling, weakness of the both lower extremities. Alleviating factors include: ice and rest.  Aggravating factors include:  movement. She states that the pain does not keep her from sleeping at night. She took her last dose of Neurontin last night. She is  on NSAIDS and  is not on anticoagulation medications to include none and is managed by none. Previous treatments: Physical Therapy and medications. .      Personal Expectations from this treatment: decrease pain    There were no vitals taken for this visit. No LMP recorded. Patient has had a hysterectomy.

## 2022-12-28 ENCOUNTER — TREATMENT (OUTPATIENT)
Dept: PHYSICAL THERAPY | Age: 77
End: 2022-12-28
Payer: MEDICARE

## 2022-12-28 DIAGNOSIS — M48.062 SPINAL STENOSIS OF LUMBAR REGION WITH NEUROGENIC CLAUDICATION: Primary | ICD-10-CM

## 2022-12-28 PROCEDURE — 97110 THERAPEUTIC EXERCISES: CPT

## 2022-12-28 NOTE — PROGRESS NOTES
[facility]          Phone: [phone] Fax: [phone]    Physical Therapy Daily Treatment Note  Date:  2022    Patient Name:  Jesus Lacey    :    MRN: [id number]    Evaluating therapist: Nyla Judd, PT, DPT  [id number]    Restrictions/Precautions:      Diagnosis:     Diagnosis Orders   1. Spinal stenosis of lumbar region with neurogenic claudication          Treatment Diagnosis:    Insurance/Certification information:  [employer], [employer]  Referring Physician:  Isidro Zhao MD  Plan of care signed (Y/N):    Visit# / total visits:    Pain level: 2-3/10   Time In:  1300  Time Out:  1338    Subjective:  pt reported mild pain today, stating it gets higher when standing    Exercises:  Exercise/Equipment Resistance/Repetitions Other comments     Bike X 8 mins      Supine hamstring stretch 30 sec x2 ea LE PFB     Supine piriformis stretch 30 sec x2 ea LE     TRA bracing 10x 5 sec hold             SLR with TRA 2 sec x 10 reps B       Bridges 2 sec x 10 reps       Seated ball marches with TRA X 10 reps  Cued for TRA     S/L hip abduction with TRA 2 sec x 10 reps         Lifting activities with mechanics training NT                                                                      Other Therapeutic Activities:  Pt tolerated progressions of TE this date. No exacerbation in pain reported.  Pt compliant with [facility]    Home Exercise Program:  supine hamstring stretch, piriformis stretch (supine or sitting), TRA btracing    Manual Treatments:  N/A    Modalities:  N/A     Time-in Time-out Total Time   44086  Evaluation Low Complexity      54111  Evaluation Med Complexity      32169  Evaluation High Complexity      96264  Ther Ex 1300 1338 38   98634  Neuro Re-ed        55257  Ther Activities        29235  Manual Therapy       81946  E-stim       71633  Ultrasound            Session [id number]       Treatment/Activity Tolerance:  [x] Patient tolerated treatment well [] Patient limited by fatigue  [] Patient limited by pain  [] Patient limited by other medical complications  [] Other:     Prognosis: [x] Good [] Fair  [] Poor    Patient Requires Follow-up: [x] Yes  [] No    Plan:   [x] Continue per plan of care [] Alter current plan (see comments)  [] Plan of care initiated [] Hold pending MD visit [] Discharge    Plan for Next Session:  progress core stability as tolerated      Electronically signed by:  Jenny Tracy PTA [id number]

## 2022-12-30 ENCOUNTER — TREATMENT (OUTPATIENT)
Dept: PHYSICAL THERAPY | Age: 77
End: 2022-12-30
Payer: MEDICARE

## 2022-12-30 DIAGNOSIS — M48.062 SPINAL STENOSIS OF LUMBAR REGION WITH NEUROGENIC CLAUDICATION: Primary | ICD-10-CM

## 2022-12-30 PROCEDURE — 97110 THERAPEUTIC EXERCISES: CPT | Performed by: PHYSICAL THERAPIST

## 2022-12-30 NOTE — PROGRESS NOTES
[facility]          Phone: [phone] Fax: [phone]    Physical Therapy Daily Treatment Note  Date:  2022    Patient Name:  Jarrett Ahmadi    :  [birth year]  MRN: [id number]    Evaluating therapist: Patricia Draper PT, DPT  [id number]    Restrictions/Precautions:      Diagnosis:     Diagnosis Orders   1. Spinal stenosis of lumbar region with neurogenic claudication            Treatment Diagnosis:    Insurance/Certification information:  [facility] Medicare, [facility] [employer]  Referring Physician:  Marilyn Vaz MD  Plan of care signed (Y/N):  yes  Visit# / total visits:  3/8  Pain level: 0/10   Time In:  1342  Time Out:  1420    Subjective:  Pt reports no c/o pain this date but does endorse paraesthesias along L4-5 distribution. Exercises:  Exercise/Equipment Resistance/Repetitions Other comments    stepper X 10 mins Level 1     Supine hamstring stretch 30 sec x2 ea LE PFB     Supine piriformis stretch 30 sec x2 ea LE     TRA bracing 5x 5 sec hold             SLR with TRA 2 sec x 10 reps B       Bridges 2 sec x 15 reps       Seated ball marches with TRA X 10 reps ea LE Cued for TRA     S/L hip abduction with TRA 2 sec x 10 reps     Seated ball marches with alternating UEs 10x ea side        Lifting activities with mechanics training NT      Standing lumbar extension 10x Centralization of radicular symptoms reported                                                             Other Therapeutic Activities:  Pt tolerated progressions of TE this date without increased discomfort throughout back. Pt noted centralization of radicular symptoms with repeated extension in standing (to ROM tolerance). Pt educated in centralization vs peripheralization with lumbar extension exercise for HEP.     Home Exercise Program:  supine hamstring stretch, piriformis stretch (supine or sitting), TRA bracing, SLR with TRA, sidelying hip abduction with TRA, bridges    Manual Treatments: N/A    Modalities:  N/A     Time-in Time-out Total Time   82565  Evaluation Low Complexity      22408  Evaluation Med Complexity      39936  Evaluation High Complexity      96525  Ther Ex 1342 1420 38   14187  Neuro Re-ed        86482  Ther Activities        93389  Manual Therapy       74005  E-stim       88074  Ultrasound            Session 0814 1235 57       Treatment/Activity Tolerance:  [x] Patient tolerated treatment well [] Patient limited by fatigue  [] Patient limited by pain  [] Patient limited by other medical complications  [] Other:     Prognosis: [x] Good [] Fair  [] Poor    Patient Requires Follow-up: [x] Yes  [] No    Plan:   [x] Continue per plan of care [] Alter current plan (see comments)  [] Plan of care initiated [] Hold pending MD visit [] Discharge    Plan for Next Session:  progress core stability as tolerated      Electronically signed by:  Slick Heller, PT, DPT  [id number]

## 2023-01-04 ENCOUNTER — TREATMENT (OUTPATIENT)
Dept: PHYSICAL THERAPY | Age: 78
End: 2023-01-04

## 2023-01-04 DIAGNOSIS — M48.062 SPINAL STENOSIS OF LUMBAR REGION WITH NEUROGENIC CLAUDICATION: Primary | ICD-10-CM

## 2023-01-04 NOTE — PROGRESS NOTES
[facility]          Phone: [phone] Fax: [phone]    Physical Therapy Daily Treatment Note  Date:  2023    Patient Name:  Lynda Argueta    :  [age]  MRN: [id number]    Evaluating therapist: Aden Gilmore PT, DPT  [id number]    Restrictions/Precautions:      Diagnosis:     Diagnosis Orders   1. Spinal stenosis of lumbar region with neurogenic claudication            Treatment Diagnosis:    Insurance/Certification information:  [facility] Medicare, [facility] [employer]  Referring Physician:  Chelsea Murdock MD  Plan of care signed (Y/N):  yes  Visit# / total visits:    Pain level: 0/10   Time In:  1308  Time Out:  1343    Subjective:  Pt reports no c/o pain this date , and reports that she believes therapy is helping her     Exercises:  Exercise/Equipment Resistance/Repetitions Other comments    stepper X 10 mins Level 1     Supine hamstring stretch 30 sec x2 ea LE PFB     Supine piriformis stretch 30 sec x2 ea LE     TRA bracing 5x 5 sec hold             SLR with TRA 2 sec x 10 reps B       Bridges 2 sec x 15 reps       Seated ball marches with TRA X 10 reps ea LE Cued for TRA     S/L hip abduction with TRA 2 sec x 10 reps     Seated ball marches with alternating UEs 10x ea side        Lifting activities with mechanics training NT      Standing lumbar extension Centralization of radicular symptoms reported                                                             Other Therapeutic Activities:  Pt tolerated progressions of TE this date without increased discomfort throughout back. Pt had brief occurrence of tingling in L LE to her foot when performing R sidelying ABD. This symptoms ceased with repositioning of pt.      Home Exercise Program:  supine hamstring stretch, piriformis stretch (supine or sitting), TRA bracing, SLR with TRA, sidelying hip abduction with TRA, bridges    Manual Treatments:  N/A    Modalities:  N/A     Time-in Time-out Total Time   20551 Evaluation Low Complexity      90231  Evaluation Med Complexity      57501  Evaluation High Complexity      14992  Ther Ex 1308 1343 35   11820  Neuro Re-ed        84075  Ther Activities        53863  Manual Therapy       39736  E-stim       17329  Ultrasound            Session 4811 7146 44       Treatment/Activity Tolerance:  [x] Patient tolerated treatment well [] Patient limited by fatigue  [] Patient limited by pain  [] Patient limited by other medical complications  [] Other:     Prognosis: [x] Good [] Fair  [] Poor    Patient Requires Follow-up: [x] Yes  [] No    Plan:   [x] Continue per plan of care [] Alter current plan (see comments)  [] Plan of care initiated [] Hold pending MD visit [] Discharge    Plan for Next Session:  progress core stability as tolerated      Electronically signed by:  Héctor Hale PTA [id number]

## 2023-01-09 ENCOUNTER — TREATMENT (OUTPATIENT)
Dept: PHYSICAL THERAPY | Age: 78
End: 2023-01-09
Payer: MEDICARE

## 2023-01-09 DIAGNOSIS — M48.062 SPINAL STENOSIS OF LUMBAR REGION WITH NEUROGENIC CLAUDICATION: Primary | ICD-10-CM

## 2023-01-09 PROCEDURE — 97110 THERAPEUTIC EXERCISES: CPT

## 2023-01-09 NOTE — PROGRESS NOTES
[facility]          Phone: [phone] Fax: [phone]    Physical Therapy Daily Treatment Note  Date:  2023    Patient Name:  Paola Clark    :  89  MRN: [id number]    Evaluating therapist: Alex Gonsalez PT, DPT  [id number]    Restrictions/Precautions:      Diagnosis:     Diagnosis Orders   1. Spinal stenosis of lumbar region with neurogenic claudication            Treatment Diagnosis:    Insurance/Certification information:  [facility] Medicare, [facility] [employer]  Referring Physician:  Christa Quijano MD  Plan of care signed (Y/N):  yes  Visit# / total visits:    Pain level: 0/10   Time In:  1042  Time Out:  1117    Subjective:  Pt reports no c/o pain this date , and reports that she believes therapy is helping her. She cancelled her back injection d/t she feels she is improving enough with therapy    Exercises:  Exercise/Equipment Resistance/Repetitions Other comments    stepper X 10 mins Level 1     Supine hamstring stretch 30 sec x2 ea LE PFB     Supine piriformis stretch 30 sec x2 ea LE     TRA bracing 5x 5 sec hold             SLR with TRA 2 sec x 10 reps B       Bridges 2 sec x 15 reps       Static sitting on ball w/ eyes closed w/ TRA X 30 sec       Seated ball marches with TRA X 10 reps ea LE Cued for TRA     S/L hip abduction with TRA 2 sec x 10 reps     Seated ball marches with alternating UEs 10x ea side        Lifting activities with mechanics training NT      Standing lumbar extension Centralization of radicular symptoms reported                                                             Other Therapeutic Activities:  Pt tolerated progressions of TE this date without increased discomfort throughout back. Pt continues to report  brief occurrence of tingling in L LE to her foot when performing R sidelying ABD. This symptom ceased with repositioning of pt.      Home Exercise Program:  supine hamstring stretch, piriformis stretch (supine or sitting), TRA bracing, SLR with TRA, sidelying hip abduction with TRA, bridges    Manual Treatments:  N/A    Modalities:  N/A     Time-in Time-out Total Time   22272  Evaluation Low Complexity      12150  Evaluation Med Complexity      90988  Evaluation High Complexity      21469  Ther Ex 1042 1117 35   94620  Neuro Re-ed        94899  Ther Activities        07092  Manual Therapy       69828  E-stim       66773  Ultrasound            Session 1622 1987 35       Treatment/Activity Tolerance:  [x] Patient tolerated treatment well [] Patient limited by fatigue  [] Patient limited by pain  [] Patient limited by other medical complications  [] Other:     Prognosis: [x] Good [] Fair  [] Poor    Patient Requires Follow-up: [x] Yes  [] No    Plan:   [x] Continue per plan of care [] Alter current plan (see comments)  [] Plan of care initiated [] Hold pending MD visit [] Discharge    Plan for Next Session:  progress core stability as tolerated      Electronically signed by:  Jessee Torres PTA [id number]

## 2023-01-11 ENCOUNTER — TREATMENT (OUTPATIENT)
Dept: PHYSICAL THERAPY | Age: 78
End: 2023-01-11

## 2023-01-11 DIAGNOSIS — M48.062 SPINAL STENOSIS OF LUMBAR REGION WITH NEUROGENIC CLAUDICATION: Primary | ICD-10-CM

## 2023-01-11 NOTE — PROGRESS NOTES
[facility]          Phone: [phone] Fax: [phone]    Physical Therapy Daily Treatment Note  Date:  2023    Patient Name:  Mar Martínez    :    MRN: [id number]    Evaluating therapist: Renny Nair PT, DPT  [id number]    Restrictions/Precautions:      Diagnosis:     Diagnosis Orders   1. Spinal stenosis of lumbar region with neurogenic claudication            Treatment Diagnosis:    Insurance/Certification information:  [facility] Medicare, [facility] [employer]  Referring Physician:  Shaina Jones MD  Plan of care signed (Y/N):  yes  Visit# / total visits:    Pain level: 1-2/10   Time In:  1040  Time Out:  1115    Subjective:  Pt reports mild soreness this date with intermittent B LE paraesthesias with standing. Exercises:  Exercise/Equipment Resistance/Repetitions Other comments    stepper X 10 mins Level 1     Supine hamstring stretch 30 sec x2 ea LE PFB     Supine piriformis stretch 30 sec x2 ea LE     TRA bracing 5x 5 sec hold             SLR with TRA 2 sec x 10 reps B       Bridges 2 sec x 15 reps       Static sitting on ball w/ eyes closed w/ TRA X 30 sec       Seated ball marches with TRA X 10 reps ea LE Cued for TRA     S/L hip abduction with TRA 2 sec x 10 reps     Seated ball marches with alternating UEs 10x ea side      Standing lumbar extension 10x   Lifting  training 5x, lifting 5# weight from 6\" step to standing      Anti-rotation punches 5x ea side Red TB with handles                                               Other Therapeutic Activities:  Pt tolerated progressions of TE this date without increased discomfort throughout back. Intermittent c/o B LE paraesthesias throughout.     Home Exercise Program:  supine hamstring stretch, piriformis stretch (supine or sitting), TRA bracing, SLR with TRA, sidelying hip abduction with TRA, bridges    Manual Treatments:  N/A    Modalities:  N/A     Time-in Time-out Total Time   39152  Evaluation Low Complexity      61133  Evaluation Med Complexity      38273  Evaluation High Complexity      72625  Ther Ex 1040 1115 35   93809  Neuro Re-ed        49273  Ther Activities        83033  Manual Therapy       90360  E-stim       53998  Ultrasound            Session 6912 1195 61       Treatment/Activity Tolerance:  [x] Patient tolerated treatment well [] Patient limited by fatigue  [] Patient limited by pain  [] Patient limited by other medical complications  [] Other:     Prognosis: [x] Good [] Fair  [] Poor    Patient Requires Follow-up: [x] Yes  [] No    Plan:   [x] Continue per plan of care [] Alter current plan (see comments)  [] Plan of care initiated [] Hold pending MD visit [] Discharge    Plan for Next Session:  progress core stability as tolerated      Electronically signed by:  Aida Esqueda, PT, DPT  [id number]

## 2023-01-13 ENCOUNTER — TELEPHONE (OUTPATIENT)
Dept: NEUROSURGERY | Age: 78
End: 2023-01-13

## 2023-01-13 NOTE — TELEPHONE ENCOUNTER
FYI  Pt called today, said she has done her testing, and following with other orders. Checking to see if she needs appt, as she is feeling well;  based pt's observation that testing had gone well and on ov note, told pt it would be alright not to schedule right now.

## 2023-01-16 ENCOUNTER — OFFICE VISIT (OUTPATIENT)
Dept: FAMILY MEDICINE CLINIC | Age: 78
End: 2023-01-16
Payer: MEDICARE

## 2023-01-16 ENCOUNTER — TREATMENT (OUTPATIENT)
Dept: PHYSICAL THERAPY | Age: 78
End: 2023-01-16
Payer: MEDICARE

## 2023-01-16 VITALS
DIASTOLIC BLOOD PRESSURE: 77 MMHG | TEMPERATURE: 96.7 F | SYSTOLIC BLOOD PRESSURE: 145 MMHG | WEIGHT: 121.2 LBS | BODY MASS INDEX: 23.8 KG/M2 | OXYGEN SATURATION: 99 % | RESPIRATION RATE: 20 BRPM | HEIGHT: 60 IN | HEART RATE: 67 BPM

## 2023-01-16 DIAGNOSIS — M54.17 LUMBOSACRAL RADICULOPATHY: ICD-10-CM

## 2023-01-16 DIAGNOSIS — M25.511 ACUTE PAIN OF RIGHT SHOULDER: ICD-10-CM

## 2023-01-16 DIAGNOSIS — G47.33 OSA ON CPAP: ICD-10-CM

## 2023-01-16 DIAGNOSIS — Z23 NEED FOR PROPHYLACTIC VACCINATION AND INOCULATION AGAINST VARICELLA: ICD-10-CM

## 2023-01-16 DIAGNOSIS — E78.1 PURE HYPERTRIGLYCERIDEMIA: Primary | ICD-10-CM

## 2023-01-16 DIAGNOSIS — Z85.42 HISTORY OF ENDOMETRIAL CANCER: ICD-10-CM

## 2023-01-16 DIAGNOSIS — Z99.89 OSA ON CPAP: ICD-10-CM

## 2023-01-16 DIAGNOSIS — M48.062 SPINAL STENOSIS OF LUMBAR REGION WITH NEUROGENIC CLAUDICATION: Primary | ICD-10-CM

## 2023-01-16 DIAGNOSIS — M79.601 RIGHT ARM PAIN: ICD-10-CM

## 2023-01-16 DIAGNOSIS — R93.7 ABNORMAL MRI, LUMBAR SPINE: ICD-10-CM

## 2023-01-16 PROBLEM — C54.1 ENDOMETRIAL CANCER (HCC): Status: RESOLVED | Noted: 2019-11-21 | Resolved: 2023-01-16

## 2023-01-16 PROCEDURE — 1123F ACP DISCUSS/DSCN MKR DOCD: CPT | Performed by: STUDENT IN AN ORGANIZED HEALTH CARE EDUCATION/TRAINING PROGRAM

## 2023-01-16 PROCEDURE — 3077F SYST BP >= 140 MM HG: CPT | Performed by: STUDENT IN AN ORGANIZED HEALTH CARE EDUCATION/TRAINING PROGRAM

## 2023-01-16 PROCEDURE — 3078F DIAST BP <80 MM HG: CPT | Performed by: STUDENT IN AN ORGANIZED HEALTH CARE EDUCATION/TRAINING PROGRAM

## 2023-01-16 PROCEDURE — 97110 THERAPEUTIC EXERCISES: CPT

## 2023-01-16 PROCEDURE — 99214 OFFICE O/P EST MOD 30 MIN: CPT | Performed by: STUDENT IN AN ORGANIZED HEALTH CARE EDUCATION/TRAINING PROGRAM

## 2023-01-16 RX ORDER — PREDNISONE 20 MG/1
40 TABLET ORAL DAILY
Qty: 10 TABLET | Refills: 0 | Status: SHIPPED | OUTPATIENT
Start: 2023-01-16 | End: 2023-01-21

## 2023-01-16 SDOH — ECONOMIC STABILITY: INCOME INSECURITY: IN THE LAST 12 MONTHS, WAS THERE A TIME WHEN YOU WERE NOT ABLE TO PAY THE MORTGAGE OR RENT ON TIME?: NO

## 2023-01-16 SDOH — ECONOMIC STABILITY: HOUSING INSECURITY: IN THE LAST 12 MONTHS, HOW MANY PLACES HAVE YOU LIVED?: 0

## 2023-01-16 SDOH — ECONOMIC STABILITY: HOUSING INSECURITY
IN THE LAST 12 MONTHS, WAS THERE A TIME WHEN YOU DID NOT HAVE A STEADY PLACE TO SLEEP OR SLEPT IN A SHELTER (INCLUDING NOW)?: NO

## 2023-01-16 ASSESSMENT — PATIENT HEALTH QUESTIONNAIRE - PHQ9
SUM OF ALL RESPONSES TO PHQ QUESTIONS 1-9: 0
SUM OF ALL RESPONSES TO PHQ QUESTIONS 1-9: 0
SUM OF ALL RESPONSES TO PHQ9 QUESTIONS 1 & 2: 0
SUM OF ALL RESPONSES TO PHQ QUESTIONS 1-9: 0
1. LITTLE INTEREST OR PLEASURE IN DOING THINGS: 0
SUM OF ALL RESPONSES TO PHQ QUESTIONS 1-9: 0
2. FEELING DOWN, DEPRESSED OR HOPELESS: 0

## 2023-01-16 ASSESSMENT — LIFESTYLE VARIABLES
HOW OFTEN DO YOU HAVE A DRINK CONTAINING ALCOHOL: MONTHLY OR LESS
HOW MANY STANDARD DRINKS CONTAINING ALCOHOL DO YOU HAVE ON A TYPICAL DAY: PATIENT DOES NOT DRINK

## 2023-01-16 ASSESSMENT — ENCOUNTER SYMPTOMS
SHORTNESS OF BREATH: 0
ABDOMINAL DISTENTION: 0
WHEEZING: 0
ABDOMINAL PAIN: 0
COUGH: 0

## 2023-01-16 ASSESSMENT — SOCIAL DETERMINANTS OF HEALTH (SDOH)
WITHIN THE LAST YEAR, HAVE YOU BEEN HUMILIATED OR EMOTIONALLY ABUSED IN OTHER WAYS BY YOUR PARTNER OR EX-PARTNER?: NO
WITHIN THE LAST YEAR, HAVE YOU BEEN KICKED, HIT, SLAPPED, OR OTHERWISE PHYSICALLY HURT BY YOUR PARTNER OR EX-PARTNER?: NO
DO YOU BELONG TO ANY CLUBS OR ORGANIZATIONS SUCH AS CHURCH GROUPS UNIONS, FRATERNAL OR ATHLETIC GROUPS, OR SCHOOL GROUPS?: YES
HOW OFTEN DO YOU ATTENT MEETINGS OF THE CLUB OR ORGANIZATION YOU BELONG TO?: MORE THAN 4 TIMES PER YEAR
WITHIN THE LAST YEAR, HAVE TO BEEN RAPED OR FORCED TO HAVE ANY KIND OF SEXUAL ACTIVITY BY YOUR PARTNER OR EX-PARTNER?: NO
WITHIN THE LAST YEAR, HAVE YOU BEEN AFRAID OF YOUR PARTNER OR EX-PARTNER?: NO
IN A TYPICAL WEEK, HOW MANY TIMES DO YOU TALK ON THE PHONE WITH FAMILY, FRIENDS, OR NEIGHBORS?: MORE THAN THREE TIMES A WEEK
HOW OFTEN DO YOU GET TOGETHER WITH FRIENDS OR RELATIVES?: MORE THAN THREE TIMES A WEEK
HOW OFTEN DO YOU ATTEND CHURCH OR RELIGIOUS SERVICES?: MORE THAN 4 TIMES PER YEAR

## 2023-01-16 NOTE — PROGRESS NOTES
Beata Gibbs (:  1945) is a 68 y.o. female, established patient follow up , here for evaluation of the following:  Shoulder Pain (Right shoulder and arm ) and Arm Pain         ASSESSMENT/PLAN      1. Pure hypertriglyceridemia  Chronic, we do discuss statin medication at each visit, she notes 1 time she had the size of her cholesterol checked and it was large which was less worrisome, she notes she like to hold off again on the statin medications evem after we did discuss that she had arterial plaques on the CT scan  2. History of endometrial cancer  History, did have PET scan, no current cancers, also Pap test that was normal last month  3. Need for prophylactic vaccination and inoculation against varicella  -     zoster recombinant adjuvanted vaccine [facility]) 50 MCG/0.5ML SUSR injection; Inject 0.5 mLs into the muscle once for 1 dose 50 MCG IM then repeat 2-6 months., Disp-1 each, R-1Print  4. Abnormal MRI, lumbar spine  Chronic, did see neurosurgery, no surgical intervention right now, was sent to pain management, did offer her an injection however she would like to do physical therapy first, using 40 mg ibuprofen twice a day, notes it is improving with the physical therapy  5. Lumbosacral radiculopathy  6. GENNY on CPAP  Chronic, continue CPAP  7. Right arm pain  Acute, 2 weeks, does not remember any strain, sleeping in an abnormal position, injury, she does note that she has bone pain, will get x-ray, strength testing today does not show any rotator cuff pathology likely, will trial burst of steroid, she will ask physical therapy for some arm exercises, and follow-up if it does not get better, will get imaging now due to the mild pain and scapular aching  -     XR HUMERUS RIGHT (MIN 2 VIEWS); Future  -     XR SHOULDER RIGHT (MIN 2 VIEWS); Future  -     XR SCAPULA RIGHT (COMPLETE); Future  8. Acute pain of right shoulder  -     XR HUMERUS RIGHT (MIN 2 VIEWS);  Future  -     XR SHOULDER RIGHT (MIN 2 VIEWS); Future  -     XR SCAPULA RIGHT (COMPLETE); Future  -     predniSONE (DELTASONE) 20 MG tablet; Take 2 tablets by mouth daily for 5 days, Disp-10 tablet, R-0Normal  Return for switch 4/18 visit to 19th or later and for AWV. Subjective   SUBJECTIVE/OBJECTIVE:  HPI  Patient is here with right shoulder and arm pain. Right side. She does not remember any injury. When she rases in front of herself she can raise to 90 degrees. She cannot going any further. She notes scapula aches and she cannot pull covers up. She has trouble combing hair. Started 2 weeks ago. She is taking 1 ibuprofen in AM and PM is helping  a little. She can raise over head and put behind her back with no problem. She notes it feels like her bone is hurting. Patient did have MRI completed, she did see neurosurgery who do not see any surgical intervention necessary, she then went to pain management and physical therapy  Looks like she also had a PET scan, results are not in the system, she does have history of uterine cancer  She did see neurosurgery and pain management. She is improving with PT. She also had arterial calcifications on the CT scan and likely should be on a statin medicine. She was on a statin at one time. I asked her every time she comes in. Declined preventative screening identified as care gaps unless ordered through this visit    PHQ2/PHQ9  PHQ-2 Score: 0  PHQ-2 Over the past 2 weeks, how often have you been bothered by any of the following problems? Little interest or pleasure in doing things: Not at all  Feeling down, depressed, or hopeless: Not at all  PHQ-2 Score: 0   PHQ-9 Total Score: 0 (1/16/2023  9:42 AM)     Past Medical History:  has a past medical history of Arthritis, Babesiosis, Endometrial cancer ([facility] [address] Hyperlipidemia, and Sleep apnea. Past Surgical History:  has a past surgical history that includes Tubal ligation (1980); Tonsillectomy (1947);  Bunionectomy (2006 2007); cyst removal (2010); Colonoscopy; other surgical history (Right, 7/3/15); Finger trigger release (Left, 12/18/2018); Colonoscopy (N/A, 12/4/2019); Carpal tunnel release (Bilateral); Hysterectomy (12/17/2019); Port Surgery (N/A, 2/11/2020); and Injection (Right, 06/2021). Social History:  reports that she has never smoked. She has never used smokeless tobacco. She reports current alcohol use. She reports that she does not use drugs. Family History: family history includes Other in her father and mother. Allergies: Rice, Azmacort [triamcinolone], Doxycycline, Mepron [atovaquone], and Zithromax [azithromycin]  Medications:   Current Outpatient Medications   Medication Sig Dispense Refill    zoster recombinant adjuvanted vaccine (SHINGRIX) 50 MCG/0.5ML SUSR injection Inject 0.5 mLs into the muscle once for 1 dose 50 MCG IM then repeat 2-6 months. 1 each 1    predniSONE (DELTASONE) 20 MG tablet Take 2 tablets by mouth daily for 5 days 10 tablet 0    ibuprofen (ADVIL;MOTRIN) 100 MG/5ML suspension       turmeric 500 MG CAPS Take by mouth daily      gabapentin (NEURONTIN) 300 MG capsule Take 1 capsule by mouth nightly for 180 days. Intended supply: 90 days 90 capsule 1    Misc Natural Products (GLUCOSAMINE CHOND COMPLEX/MSM) TABS Take 1 tablet by mouth daily      acetaminophen (TYLENOL) 325 MG tablet Take 650 mg by mouth every 6 hours as needed for Pain      vitamin D-3 (CHOLECALCIFEROL) 5000 UNITS TABS Take 5,000 Units by mouth daily Last dose 2-6-20      Magnesium 500 MG CAPS Take by mouth every evening Last dose 2-6-20       No current facility-administered medications for this visit. Allergies: Rice, Azmacort [triamcinolone], Doxycycline, Mepron [atovaquone], and Zithromax [azithromycin]     Review of Systems   Constitutional:  Negative for chills, fatigue, fever and unexpected weight change. Respiratory:  Negative for cough, shortness of breath and wheezing.     Cardiovascular:  Negative for chest pain, palpitations and leg swelling. Gastrointestinal:  Negative for abdominal distention and abdominal pain. Genitourinary:  Negative for dysuria. Musculoskeletal:  Positive for arthralgias. Neurological:  Negative for weakness, light-headedness, numbness and headaches. All other systems reviewed and are negative. Objective   BP (!) 145/77 (Site: Left Upper Arm, Position: Sitting, Cuff Size: Medium Adult)   Pulse 67   Temp (!) 96.7 °F (35.9 °C) (Temporal)   Resp 20   Ht 4' 11.5\" (1.511 m)   Wt 121 lb 3.2 oz (55 kg)   SpO2 99%   BMI 24.07 kg/m²       Lab Results   Component Value Date    LABA1C 5.7 (H) 10/19/2022    LABA1C 5.3 10/18/2021     Lab Results   Component Value Date    CHOL 256 (H) 10/19/2022    CHOL 247 (H) 10/18/2021     Lab Results   Component Value Date    TRIG 102 10/19/2022    TRIG 101 10/18/2021     Lab Results   Component Value Date    HDL 81 10/19/2022    HDL 76 10/18/2021     Lab Results   Component Value Date    LDLCALC 155 (H) 10/19/2022    LDLCALC 151 (H) 10/18/2021     Lab Results   Component Value Date    LABVLDL 20 10/19/2022    LABVLDL 20 10/18/2021     No results found for: CHOLHDLRATIO   Creatinine   Date Value Ref Range Status   10/19/2022 0.8 0.5 - 1.0 mg/dL Final   10/18/2021 0.8 0.5 - 1.0 mg/dL Final   06/20/2013 0.6 0.5 - 1.1 mg/dL Final       The 10-year ASCVD risk score (Mary ALEJO, et al., 2019) is: 25.3%    Values used to calculate the score:      Age: 68 years      Sex: Female      Is Non- : No      Diabetic: No      Tobacco smoker: No      Systolic Blood Pressure: 670 mmHg      Is BP treated: No      HDL Cholesterol: 81 mg/dL      Total Cholesterol: 256 mg/dL     Physical Exam  Constitutional:       General: She is not in acute distress. Appearance: Normal appearance. HENT:      Head: Normocephalic and atraumatic.       Right Ear: External ear normal.      Nose: Nose normal.      Mouth/Throat:      Mouth: Mucous membranes are moist.   Eyes: Extraocular Movements: Extraocular movements intact. Conjunctiva/sclera: Conjunctivae normal.   Cardiovascular:      Rate and Rhythm: Normal rate and regular rhythm. Pulses: Normal pulses. Heart sounds: No murmur heard. Pulmonary:      Effort: Pulmonary effort is normal.      Breath sounds: Normal breath sounds. No wheezing. Musculoskeletal:         General: Normal range of motion. Right lower leg: No edema. Left lower leg: No edema. Comments: Able to lift hands over her head and behind back without any decrease of strength, and equally symmetrical sides, she has negative bicep testing, the only problem she really has is lifting her arm above 90 degrees when it is straight out in front of her, neurovascularly intact, no radiating pain   Neurological:      Mental Status: She is alert. Psychiatric:         Mood and Affect: Mood normal.         Behavior: Behavior normal.           An electronic signature was used to authenticate this note. --Ceasar Singh MD       *NOTE: This report was transcribed using voice recognition software. Every effort was made to ensure accuracy; however, inadvertent computerized transcription errors may be present.

## 2023-01-16 NOTE — PROGRESS NOTES
[facility]          Phone: [phone] Fax: [phone]    Physical Therapy Daily Treatment Note  Date:  2023    Patient Name:  Stiven Gottlieb    :    MRN: [id number]    Evaluating therapist: Pierre Nguyen PT, DPT  [id number]    Restrictions/Precautions:      Diagnosis:     Diagnosis Orders   1. Spinal stenosis of lumbar region with neurogenic claudication            Treatment Diagnosis:    Insurance/Certification information:  [facility] Medicare, [facility] [employer]  Referring Physician:  Iván Nash MD  Plan of care signed (Y/N):  yes  Visit# / total visits:    Pain level: 1-2/10   Time In:  1040  Time Out:  1115    Subjective:  Pt reports mild soreness this date with intermittent B LE paraesthesias with standing. Pt came to therapy reporting R UE pain, from R scapula to R elbow. Pt very guarded with R UE movement. Pt just left primary care , and had an xray R UE . Awaiting results. Pt reported she did not have a fall , and can not attribute R UE pain to anything specific occurring to cause it. R UE 6-7/10 pain    Exercises:  Exercise/Equipment Resistance/Repetitions Other comments    stepper X 10 mins Level 1     Supine hamstring stretch 30 sec x2 ea LE PFB     Supine piriformis stretch 30 sec x2 ea LE     TRA bracing 5x 5 sec hold             SLR with TRA 2 sec x 10 reps B       Bridges 2 sec x 15 reps       Static sitting on ball w/ eyes closed w/ TRA X 30 sec       Seated ball marches with TRA X 10 reps ea LE Cued for TRA     S/L hip abduction with TRA 2 sec x 10 reps     Seated ball marches with alternating UEs 10x ea side      Standing lumbar extension 10x    Red TB with handles                                               Other Therapeutic Activities:  Pt tolerated  TE this date. No c/o B LE paraesthesias throughout.     Home Exercise Program:  supine hamstring stretch, piriformis stretch (supine or sitting), TRA bracing, SLR with TRA, sidelying hip abduction with TRA, bridges    Manual Treatments:  N/A    Modalities:  N/A     Time-in Time-out Total Time   82536  Evaluation Low Complexity      25823  Evaluation Med Complexity      49837  Evaluation High Complexity      78914  Ther Ex 1040 1115 35   11802  Neuro Re-ed        88632  Ther Activities        34213  Manual Therapy       47478  E-stim       06781  Ultrasound            Session 9520 6469 35       Treatment/Activity Tolerance:  [x] Patient tolerated treatment well [] Patient limited by fatigue  [] Patient limited by pain  [] Patient limited by other medical complications  [] Other:     Prognosis: [x] Good [] Fair  [] Poor    Patient Requires Follow-up: [x] Yes  [] No    Plan:   [x] Continue per plan of care [] Alter current plan (see comments)  [] Plan of care initiated [] Hold pending MD visit [] Discharge    Plan for Next Session:  progress core stability as tolerated      Electronically signed by:  Bruno Blount PTA [id number]

## 2023-01-19 ENCOUNTER — TREATMENT (OUTPATIENT)
Dept: PHYSICAL THERAPY | Age: 78
End: 2023-01-19
Payer: MEDICARE

## 2023-01-19 DIAGNOSIS — M48.062 SPINAL STENOSIS OF LUMBAR REGION WITH NEUROGENIC CLAUDICATION: Primary | ICD-10-CM

## 2023-01-19 PROCEDURE — 97110 THERAPEUTIC EXERCISES: CPT

## 2023-01-19 NOTE — PROGRESS NOTES
[facility]          Phone: [phone] Fax: [phone]    Physical Therapy Daily Treatment Note  Date:  2023    Patient Name:  Ronaldo Art    :  [birth year]  MRN: [id number]    Evaluating therapist: Veda Larios PT, DPT  [id number]    Restrictions/Precautions:      Diagnosis:     Diagnosis Orders   1. Spinal stenosis of lumbar region with neurogenic claudication            Treatment Diagnosis:    Insurance/Certification information:  [facility] Medicare, [facility] [employer]  Referring Physician:  Gabbie Cai MD  Plan of care signed (Y/N):  yes  Visit# / total visits:    Pain level: 0-2/10   Time In:  957  Time Out:  1010    Subjective:  Pt reports she is very happy with the results therapy has yielded for her LBP management. Pt reported she can stand 10 mins with 0-2 / 10 pain. She can play with grand child w 0-2/10 pain. Pt opted to just do discharge  info as she performs her HEP indep 1-2 time daily. Exercises:  Exercise/Equipment Resistance/Repetitions Other comments    stepper Level 1     Supine hamstring stretch PFB     Supine piriformis stretch     TRA bracing            SLR with TRA      Bridges      Static sitting on ball w/ eyes closed w/ TRA      Seated ball marches with TRA Cued for TRA     S/L hip abduction with TRA    Seated ball marches with alternating UEs      Standing lumbar extension    Red TB with handles            Lumbar ext 25°       MMT B LE 5/5 throughout              Oswestry 24%              Other Therapeutic Activities:  Pt tolerated  TE this date. No c/o B LE paraesthesias throughout.     Home Exercise Program:  supine hamstring stretch, piriformis stretch (supine or sitting), TRA bracing, SLR with TRA, sidelying hip abduction with TRA, bridges    Manual Treatments:  N/A    Modalities:  N/A     Time-in Time-out Total Time   93220  Evaluation Low Complexity      11107  Evaluation Med Complexity      15690  Evaluation High Complexity   92066  Ther Ex 957 1010 13   21713  Neuro Re-ed        54423  Ther Activities        99051  Manual Therapy       15598  E-stim       25850  Ultrasound            Session 301 1010 13       Treatment/Activity Tolerance:  [x] Patient tolerated treatment well [] Patient limited by fatigue  [] Patient limited by pain  [] Patient limited by other medical complications  [] Other:     Prognosis: [x] Good [] Fair  [] Poor    Patient Requires Follow-up: [x] Yes  [] No    Plan:   [] Continue per plan of care [] Alter current plan (see comments)  [] Plan of care initiated [] Hold pending MD visit [x] Discharge    Plan for Next Session:        Electronically signed by:  Shanita Carrion PTA [id number]

## 2023-01-19 NOTE — PROGRESS NOTES
[facility]                Phone: [phone] Fax: [phone]    Physical Therapy  Outpatient Discharge Summary     Date:  2023    Patient Name:  Ronaldo rAt    :    MRN: [id number]    DIAGNOSIS:     Diagnosis Orders   1. Spinal stenosis of lumbar region with neurogenic claudication          REFERRING PHYSICIAN:  Gabbie Cai MD    ATTENDANCE:  Pt has attended 8 of 8 scheduled treatments from 22 to 23. TREATMENTS RECEIVED:  Skilled PT services included therapeutic exercises to improve ROM and muscle tension, education for improved body mechanics, exercises for improved core strength and stability. INITIAL STATUS:  Pain reported 0-7/10  ROM decreased in lumbar extension  Strength decreased in core stabilizers, slightly in B LEs  Decreased functional ability with walking, standing, lifting, carrying  Modified Oswestry 36% disability    FINAL STATUS:  Pain reported 0-2/10  ROM: 25° lumbar extension  Strength improved in core stability as demonstrated with functional mobility and TE's, 5/5 B LE strength   Improved standing and ambulation tolerance with 0-2/10 pain, increased activity tolerance to play with her grandchild with 0-2/10 pain  Modified Oswestry 24% disability    GOALS:  5 out of 6 Long Term Goals were obtained. LONG TERM GOALS NOT OBTAINED/REASON:  Oswestry low back disability self report improved but did not reach goal level of 22% disability. PATIENT GOALS:   pain relief, get back to normal, learn how to manage condition , be able to spend time and play with great granddaughter. --Met    REASON FOR DISCHARGE:  POC completed, pt pleased with progress achieved and is now independent with HEP. PATIENT EDUCATION/INSTRUCTIONS:  continue with HEP to maintain improvements in ROM and core strength/stability    RECOMMENDATIONS:  follow up with neurosurgery or PCP as needed.         Thank you for the opportunity to work with your patient. If you have questions or comments, please feel free to contact me by phone or fax.       Electronically Signed by: Adrien Simmons PT, DPT  [id number] 1/19/2023

## 2023-01-24 ENCOUNTER — TELEPHONE (OUTPATIENT)
Dept: FAMILY MEDICINE CLINIC | Age: 78
End: 2023-01-24

## 2023-01-24 DIAGNOSIS — M25.511 ACUTE PAIN OF RIGHT SHOULDER: Primary | ICD-10-CM

## 2023-01-24 NOTE — TELEPHONE ENCOUNTER
Pt calling and states she would like a referral to Dr Kristopher Rice for her shoulder pain that was discussed at the last appt.

## 2023-01-25 NOTE — TELEPHONE ENCOUNTER
Referral placed for patient per request    1.  Acute pain of right shoulder  -     External Referral To Orthopedic Surgery

## 2023-02-02 ENCOUNTER — OFFICE VISIT (OUTPATIENT)
Dept: PRIMARY CARE CLINIC | Age: 78
End: 2023-02-02

## 2023-02-02 VITALS
TEMPERATURE: 97.2 F | BODY MASS INDEX: 24.15 KG/M2 | SYSTOLIC BLOOD PRESSURE: 160 MMHG | HEART RATE: 70 BPM | RESPIRATION RATE: 16 BRPM | HEIGHT: 60 IN | WEIGHT: 123 LBS | DIASTOLIC BLOOD PRESSURE: 74 MMHG | OXYGEN SATURATION: 97 %

## 2023-02-02 DIAGNOSIS — B34.9 VIRAL ILLNESS: Primary | ICD-10-CM

## 2023-02-02 DIAGNOSIS — J02.9 SORE THROAT: ICD-10-CM

## 2023-02-02 LAB — S PYO AG THROAT QL: NORMAL

## 2023-02-02 NOTE — PROGRESS NOTES
23  Kim Fails : 1945 Sex: female  Age 68 y.o. Subjective:  Chief Complaint   Patient presents with    Cough     Covid test neg at home     Pharyngitis     Sx started yesterday     Otalgia       HPI:   Kim Lozano , 68 y.o. female presents to the clinic for evaluation of sore throat x 1 day. The patient also reports loss of voice, ear discomfort, post nasal drainage and mild cough. The patient has taken Tylenol for symptoms. The patient reports unchanged symptoms over time. The patient denies known ill exposure. The patient reports negative home COVID-19 test. The patient denies headache, sinus congestion, rash, and fever. The patient also denies chest pain, abdominal pain, shortness of breath, and nausea / vomiting / diarrhea. ROS:   Unless otherwise stated in this report the patient's positive and negative responses for review of systems for constitutional, eyes, ENT, cardiovascular, respiratory, gastrointestinal, neurological, , musculoskeletal, and integument systems and related systems to the presenting problem are either stated in the history of present illness or were not pertinent or were negative for the symptoms and/or complaints related to the presenting medical problem. Positives and pertinent negatives as per HPI. All others reviewed and are negative. PMH:     Past Medical History:   Diagnosis Date    Arthritis     Babesiosis     tick disease; treated    Endometrial cancer ([facility] [address] 2019 approx    new dx    Hyperlipidemia     Sleep apnea        Past Surgical History:   Procedure Laterality Date    BUNIONECTOMY  2006    bilateral    CARPAL TUNNEL RELEASE Bilateral     COLONOSCOPY      COLONOSCOPY N/A 2019    COLONOSCOPY WITH BIOPSY performed by Monica Arzola MD at . [facility]      right hand    FINGER TRIGGER RELEASE Left 2018    left ring finger.     HYSTERECTOMY (CERVIX STATUS UNKNOWN)  2019    [facility] INJECTION Right 06/2021    Patient had right knee injection    OTHER SURGICAL HISTORY Right 7/3/15    excision mass right upper arm    PORT SURGERY N/A 2/11/2020    INSERTION OF A POWER PORT performed by Chiqui Odom MD at [address] [place] [address]       Family History   Problem Relation Age of Onset    Other Mother     Other Father        Medications:     Current Outpatient Medications:     ibuprofen (ADVIL;MOTRIN) 100 MG/5ML suspension, , Disp: , Rfl:     turmeric 500 MG CAPS, Take by mouth daily, Disp: , Rfl:     gabapentin (NEURONTIN) 300 MG capsule, Take 1 capsule by mouth nightly for 180 days. Intended supply: 90 days, Disp: 90 capsule, Rfl: 1    Misc Natural Products (GLUCOSAMINE CHOND COMPLEX/MSM) TABS, Take 1 tablet by mouth daily, Disp: , Rfl:     acetaminophen (TYLENOL) 325 MG tablet, Take 650 mg by mouth every 6 hours as needed for Pain, Disp: , Rfl:     vitamin D-3 (CHOLECALCIFEROL) 5000 UNITS TABS, Take 5,000 Units by mouth daily Last dose 2-6-20, Disp: , Rfl:     Magnesium 500 MG CAPS, Take by mouth every evening Last dose 2-6-20, Disp: , Rfl:     Allergies: Allergies   Allergen Reactions    Rice Hives    Azmacort [Triamcinolone] Swelling    Doxycycline Rash    Mepron [Atovaquone] Rash    Zithromax [Azithromycin] Rash       Social History:     Social History     Tobacco Use    Smoking status: Never    Smokeless tobacco: Never   Vaping Use    Vaping Use: Never used   Substance Use Topics    Alcohol use: Yes     Alcohol/week: 0.0 standard drinks     Comment: rarely    Drug use: No       Physical Exam:     Vitals:    02/02/23 1148   BP: (!) 160/74   Pulse: 70   Resp: 16   Temp: 97.2 °F (36.2 °C)   TempSrc: Temporal   SpO2: 97%   Weight: 123 lb (55.8 kg)   Height: 4' 11.5\" (1.511 m)       Physical Exam (PE)    Physical Exam  Constitutional:       Appearance: Normal appearance. HENT:      Head: Normocephalic.       Right Ear: Tympanic membrane, ear canal and external ear normal.      Left Ear: Tympanic membrane, ear canal and external ear normal.      Nose: Rhinorrhea present. No congestion. Mouth/Throat:      Mouth: Mucous membranes are moist.      Pharynx: Oropharynx is clear. Posterior oropharyngeal erythema present. No oropharyngeal exudate. Eyes:      Pupils: Pupils are equal, round, and reactive to light. Cardiovascular:      Rate and Rhythm: Normal rate and regular rhythm. Pulses: Normal pulses. Heart sounds: Normal heart sounds. Pulmonary:      Effort: Pulmonary effort is normal.      Breath sounds: Normal breath sounds. No wheezing, rhonchi or rales. Abdominal:      General: Bowel sounds are normal.      Palpations: Abdomen is soft. Musculoskeletal:         General: Normal range of motion. Cervical back: Normal range of motion and neck supple. Lymphadenopathy:      Cervical: No cervical adenopathy. Skin:     General: Skin is warm and dry. Capillary Refill: Capillary refill takes less than 2 seconds. Neurological:      General: No focal deficit present. Mental Status: She is alert and oriented to person, place, and time. Psychiatric:         Mood and Affect: Mood normal.         Behavior: Behavior normal.        Testing:   (All laboratory and radiology results have been personally reviewed by myself)  Labs:  Results for orders placed or performed in visit on 02/02/23   POCT rapid strep A   Result Value Ref Range    Strep A Ag None Detected None Detected       Imaging: All Radiology results interpreted by Radiologist unless otherwise noted. No orders to display       Assessment / Plan:   The patient's vitals, allergies, medications, and past medical history have been reviewed. Sandra Benavidez was seen today for cough, pharyngitis and otalgia.     Diagnoses and all orders for this visit:    Viral illness    Sore throat  -     POCT rapid strep A      - Disposition: Home    - Educational material printed for patient's review and were included in patient instructions. After Visit Summary was given to patient at the end of visit. - Pt reported receiving steroid injection yesterday for shoulder pain. - Encouraged oral fluids and rest. Discussed symptomatic treatments with patient today. The patient is to follow-up with PCP in the next 2-3 days for reevaluation. Red flag symptoms were also discussed with the patient today. If symptoms worsen the patient is to go directly to the emergency department for reevaluation and treatment. Pt verbalizes understanding and is in agreement with plan of care. All questions answered. SIGNATURE: Romayne Sicilian, APRN-CNP    *NOTE: This report was transcribed using voice recognition software. Every effort was made to ensure accuracy; however, inadvertent computerized transcription errors may be present.

## 2023-02-06 ENCOUNTER — OFFICE VISIT (OUTPATIENT)
Dept: PRIMARY CARE CLINIC | Age: 78
End: 2023-02-06
Payer: MEDICARE

## 2023-02-06 VITALS
SYSTOLIC BLOOD PRESSURE: 167 MMHG | HEART RATE: 83 BPM | DIASTOLIC BLOOD PRESSURE: 76 MMHG | TEMPERATURE: 96.4 F | OXYGEN SATURATION: 98 %

## 2023-02-06 DIAGNOSIS — J40 SINOBRONCHITIS: ICD-10-CM

## 2023-02-06 DIAGNOSIS — J32.9 SINOBRONCHITIS: ICD-10-CM

## 2023-02-06 DIAGNOSIS — R05.8 PRODUCTIVE COUGH: ICD-10-CM

## 2023-02-06 DIAGNOSIS — R06.2 WHEEZING: ICD-10-CM

## 2023-02-06 PROCEDURE — 1123F ACP DISCUSS/DSCN MKR DOCD: CPT | Performed by: NURSE PRACTITIONER

## 2023-02-06 PROCEDURE — G8420 CALC BMI NORM PARAMETERS: HCPCS | Performed by: NURSE PRACTITIONER

## 2023-02-06 PROCEDURE — G8427 DOCREV CUR MEDS BY ELIG CLIN: HCPCS | Performed by: NURSE PRACTITIONER

## 2023-02-06 PROCEDURE — G8400 PT W/DXA NO RESULTS DOC: HCPCS | Performed by: NURSE PRACTITIONER

## 2023-02-06 PROCEDURE — 94640 AIRWAY INHALATION TREATMENT: CPT | Performed by: NURSE PRACTITIONER

## 2023-02-06 PROCEDURE — 3078F DIAST BP <80 MM HG: CPT | Performed by: NURSE PRACTITIONER

## 2023-02-06 PROCEDURE — 3077F SYST BP >= 140 MM HG: CPT | Performed by: NURSE PRACTITIONER

## 2023-02-06 PROCEDURE — G8484 FLU IMMUNIZE NO ADMIN: HCPCS | Performed by: NURSE PRACTITIONER

## 2023-02-06 PROCEDURE — 99213 OFFICE O/P EST LOW 20 MIN: CPT | Performed by: NURSE PRACTITIONER

## 2023-02-06 PROCEDURE — 1090F PRES/ABSN URINE INCON ASSESS: CPT | Performed by: NURSE PRACTITIONER

## 2023-02-06 PROCEDURE — 1036F TOBACCO NON-USER: CPT | Performed by: NURSE PRACTITIONER

## 2023-02-06 RX ORDER — GUAIFENESIN 600 MG/1
600 TABLET, EXTENDED RELEASE ORAL 2 TIMES DAILY
Qty: 30 TABLET | Refills: 0 | Status: SHIPPED | OUTPATIENT
Start: 2023-02-06 | End: 2023-02-21

## 2023-02-06 RX ORDER — ALBUTEROL SULFATE 90 UG/1
2 AEROSOL, METERED RESPIRATORY (INHALATION) 4 TIMES DAILY PRN
Qty: 18 G | Refills: 0 | Status: SHIPPED | OUTPATIENT
Start: 2023-02-06

## 2023-02-06 RX ORDER — IPRATROPIUM BROMIDE AND ALBUTEROL SULFATE 2.5; .5 MG/3ML; MG/3ML
1 SOLUTION RESPIRATORY (INHALATION) ONCE
Status: COMPLETED | OUTPATIENT
Start: 2023-02-06 | End: 2023-02-06

## 2023-02-06 RX ORDER — PREDNISONE 10 MG/1
10 TABLET ORAL 2 TIMES DAILY
Qty: 10 TABLET | Refills: 0 | Status: SHIPPED | OUTPATIENT
Start: 2023-02-06 | End: 2023-02-11

## 2023-02-06 RX ADMIN — IPRATROPIUM BROMIDE AND ALBUTEROL SULFATE 1 AMPULE: 2.5; .5 SOLUTION RESPIRATORY (INHALATION) at 11:39

## 2023-02-06 NOTE — PROGRESS NOTES
Chief Complaint:   Cough, Congestion, and Wheezing      History of Present Illness   Source of history provided by:  patient. Dominic Shankar is a 68 y.o. old female with a past medical history of:   Past Medical History:   Diagnosis Date    Arthritis     Babesiosis 2009    tick disease; treated    Endometrial cancer ([facility] [address] 11/21 2019 approx    new dx    Hyperlipidemia     Sleep apnea        Pt  presents to the [facility] care with a cough/wheezing/chest congestion for the past several days. States the cough is  productive with yellow sputum. No  fever noted. Denies any N/V/D, abdominal pain, CP, progressive SOB, dizziness, or lethargy. Pt was recently seen at [facility] on 2/2/23 and dx w/viral URI, pt then went to a local [facility] and dx w/Bacterial Sinusitis yesterday, pt was started on Augmentin and Tessalon. . Pt stated she is now having wheezing and cough is worsening        ROS    Unless otherwise stated in this report or unable to obtain because of the patient's clinical or mental status as evidenced by the medical record, this patients's positive and negative responses for Review of Systems, constitutional, psych, eyes, ENT, cardiovascular, respiratory, gastrointestinal, neurological, genitourinary, musculoskeletal, integument systems and systems related to the presenting problem are either stated in the preceding or were not pertinent or were negative for the symptoms and/or complaints related to the medical problem. Past Surgical History:  has a past surgical history that includes Tubal ligation (1980); Tonsillectomy (1947); Bunionectomy (2006 2007); cyst removal (2010); Colonoscopy; other surgical history (Right, 7/3/15); Finger trigger release (Left, 12/18/2018); Colonoscopy (N/A, 12/4/2019); Carpal tunnel release (Bilateral); Hysterectomy (12/17/2019); Port Surgery (N/A, 2/11/2020); and Injection (Right, 06/2021). Social History:  reports that she has never smoked.  She has never used smokeless tobacco. She reports current alcohol use. She reports that she does not use drugs. Family History: family history includes Other in her father and mother. Allergies: Rice, Azmacort [triamcinolone], Doxycycline, Mepron [atovaquone], and Zithromax [azithromycin]    Physical Exam         VS:  BP (!) 167/76   Pulse 83   Temp (!) 96.4 °F (35.8 °C) (Temporal)   SpO2 98%    Oxygen Saturation Interpretation: Normal.    Constitutional:  Alert, development consistent with age. Ears:  External Ears: Normal bilateral pinna. TM's & External Canals: TM's normal bilaterally without perforation. Canals without erythema or drainage. Nose:   There is no obvious septal defect. Moderate redness/edema  Mouth:  Moist bucca mucosa and normal tongue. Throat: Mild posterior pharyngeal erythema without exudates or lesions. Neck:  Supple. There is no obvious adenopathy or neck tenderness. Lungs:   Breath sounds: Normal chest expansion and breath sounds: mild/moderate wheezing bilaterally. Cough reproduced w/deep breathing. Pulse ox 98%. APPEND: after duoneb treatment-improved  Heart:  Regular rate and rhythm, normal heart sounds, without pathological murmurs, ectopy, gallops, or rubs. Skin:  Normal turgor. Warm, dry, without visible rash. Neurological:  Oriented. Motor functions intact. Lab / Imaging Results   (All laboratory and radiology results have been personally reviewed by myself)  Labs:  No results found for this visit on 02/06/23. Imaging: All Radiology results interpreted by Radiologist unless otherwise noted. No orders to display         Assessment / Plan     Impression(s):  1. Sinobronchitis    2. Wheezing    3. Productive cough      Disposition:  Disposition: Chest xray now. Duoneb treatment. Continue Augmentin and Tessalon, will add Prednisone, Albuterol inhaler and Mucinex. Stay well hydrated.   Return to walk in care w/any worsening or concerning medical issues    Chest Xray reveals:  no acute process

## 2023-02-16 ENCOUNTER — OFFICE VISIT (OUTPATIENT)
Dept: FAMILY MEDICINE CLINIC | Age: 78
End: 2023-02-16

## 2023-02-16 VITALS
SYSTOLIC BLOOD PRESSURE: 150 MMHG | TEMPERATURE: 97.1 F | DIASTOLIC BLOOD PRESSURE: 80 MMHG | WEIGHT: 124 LBS | HEIGHT: 60 IN | BODY MASS INDEX: 24.35 KG/M2 | OXYGEN SATURATION: 98 % | RESPIRATION RATE: 20 BRPM | HEART RATE: 68 BPM

## 2023-02-16 DIAGNOSIS — M25.512 CHRONIC LEFT SHOULDER PAIN: ICD-10-CM

## 2023-02-16 DIAGNOSIS — I10 PRIMARY HYPERTENSION: Primary | ICD-10-CM

## 2023-02-16 DIAGNOSIS — G89.29 CHRONIC LEFT SHOULDER PAIN: ICD-10-CM

## 2023-02-16 DIAGNOSIS — R05.2 SUBACUTE COUGH: ICD-10-CM

## 2023-02-16 DIAGNOSIS — I25.10 CORONARY ARTERY DISEASE INVOLVING NATIVE CORONARY ARTERY OF NATIVE HEART WITHOUT ANGINA PECTORIS: ICD-10-CM

## 2023-02-16 DIAGNOSIS — M54.17 LUMBOSACRAL RADICULOPATHY: ICD-10-CM

## 2023-02-16 RX ORDER — BENZONATATE 100 MG/1
100 CAPSULE ORAL 3 TIMES DAILY PRN
Qty: 30 CAPSULE | Refills: 1 | Status: SHIPPED | OUTPATIENT
Start: 2023-02-16 | End: 2023-02-26

## 2023-02-16 RX ORDER — LISINOPRIL 5 MG/1
5 TABLET ORAL DAILY
Qty: 30 TABLET | Refills: 5 | Status: SHIPPED | OUTPATIENT
Start: 2023-02-16

## 2023-02-16 RX ORDER — GABAPENTIN 100 MG/1
200 CAPSULE ORAL 3 TIMES DAILY
Qty: 270 CAPSULE | Refills: 1 | Status: SHIPPED
Start: 2023-02-16 | End: 2023-02-16

## 2023-02-16 RX ORDER — BENZONATATE 100 MG/1
100 CAPSULE ORAL 3 TIMES DAILY PRN
Qty: 30 CAPSULE | Refills: 1 | Status: SHIPPED
Start: 2023-02-16 | End: 2023-02-16

## 2023-02-16 RX ORDER — GABAPENTIN 100 MG/1
200 CAPSULE ORAL 3 TIMES DAILY
Qty: 270 CAPSULE | Refills: 1 | Status: SHIPPED | OUTPATIENT
Start: 2023-02-16 | End: 2023-05-17

## 2023-02-16 ASSESSMENT — ENCOUNTER SYMPTOMS
ABDOMINAL PAIN: 0
ABDOMINAL DISTENTION: 0
WHEEZING: 0
SHORTNESS OF BREATH: 0
COUGH: 0

## 2023-02-16 NOTE — PROGRESS NOTES
Roopa Couch (:  1945) is a 77 y.o. female, established patient follow up , here for evaluation of the following:  Follow-up (Follow up from walk in. Pt states she's dealing with cough and nasal drainage. )         ASSESSMENT/PLAN      1. Primary hypertension  Chronic, continues to be elevated at each visit, she has not been taking it at home, will start a low-dose of lisinopril, did discuss side effects, good kidney function  -     lisinopril (PRINIVIL;ZESTRIL) 5 MG tablet; Take 1 tablet by mouth daily, Disp-30 tablet, R-5Normal  2. Coronary artery disease involving native coronary artery of native heart without angina pectoris  Patient declined statin medication, discussed with patient that ASCVD risk is high for heart attack and stroke in the next 10 years and that guideline directed medical therapy based on evidence recommends the high intensity statin.  Shared decision making used and evidence shared with patient as well as mechanism of action to prevent stroke and heart attack of the statin.  Patient endorsed understanding is willing to incur risk and will follow strict lifestyle modifications including cardio metabolic food plan, weight loss, 150 minutes of cardiovascular exercise a week, and stress reduction techniques   3. Lumbosacral radiculopathy  Chronic, now following with pain management, did see neurosurgery without need for surgery, is thinking about getting an epidural injection  She does think that the 300 mg of gabapentin does work at night however she try to take it during the day and it seemed to be too much medication and made her feel foggy headed and imbalance, will trial a lower dose, she can take 300 mg at night and then 100 mg during the day  -Gabapentin 100 mg tablets, 200 mg 3 times a day    4. Chronic left shoulder pain  Chronic, did see Ortho, had injection in the left shoulder  5. Subacute cough  Likely postviral cough, did discuss staying hydrated, avoiding irritants,  Tessalon Perles, if it does not resolve will need to get CT chest that she does have smoke exposure from her   -     benzonatate (TESSALON) 100 MG capsule; Take 1 capsule by mouth 3 times daily as needed for Cough, Disp-30 capsule, R-1Normal  Return for change april 19th to the [facility] . Subjective   SUBJECTIVE/OBJECTIVE:  HPI    She was seen through urgent care and prescribed Augmentin, she was then seen through walk-in care 2/6/2023 she was put on prednisone, given a DuoNeb in the office, chest x-ray was okay. She is much better but still with cough. She is going back to see pain management. She plans to do an epidural. She did think therapy helped. She does note she is continuing to do exercises     We did do external referral to Ortho at last visit. She went to Dr Basilio White about her shoulder. She had injection. She is doing exercises. Declined preventative screening identified as care gaps unless ordered through this visit    PHQ2/PHQ9      No data recorded     Past Medical History:  has a past medical history of Arthritis, Babesiosis, Endometrial cancer ([facility] [address] Hyperlipidemia, and Sleep apnea. Past Surgical History:  has a past surgical history that includes Tubal ligation (1980); Tonsillectomy (1947); Bunionectomy (2006 2007); cyst removal (2010); Colonoscopy; other surgical history (Right, 7/3/15); Finger trigger release (Left, 12/18/2018); Colonoscopy (N/A, 12/4/2019); Carpal tunnel release (Bilateral); Hysterectomy (12/17/2019); Port Surgery (N/A, 2/11/2020); and Injection (Right, 06/2021). Social History:  reports that she has never smoked. She has never used smokeless tobacco. She reports current alcohol use. She reports that she does not use drugs. Family History: family history includes Other in her father and mother.   Allergies: Rice, Azmacort [triamcinolone], Doxycycline, Mepron [atovaquone], and Zithromax [azithromycin]  Medications:   Current Outpatient Medications   Medication Sig Dispense Refill    lisinopril (PRINIVIL;ZESTRIL) 5 MG tablet Take 1 tablet by mouth daily 30 tablet 5    gabapentin (NEURONTIN) 100 MG capsule Take 2 capsules by mouth 3 times daily for 90 days. Intended supply: 90 days 270 capsule 1    benzonatate (TESSALON) 100 MG capsule Take 1 capsule by mouth 3 times daily as needed for Cough 30 capsule 1    albuterol sulfate HFA (VENTOLIN HFA) 108 (90 Base) MCG/ACT inhaler Inhale 2 puffs into the lungs 4 times daily as needed for Wheezing 18 g 0    ibuprofen (ADVIL;MOTRIN) 100 MG/5ML suspension       turmeric 500 MG CAPS Take by mouth daily      Misc Natural Products (GLUCOSAMINE CHOND COMPLEX/MSM) TABS Take 1 tablet by mouth daily      acetaminophen (TYLENOL) 325 MG tablet Take 650 mg by mouth every 6 hours as needed for Pain      vitamin D-3 (CHOLECALCIFEROL) 5000 UNITS TABS Take 5,000 Units by mouth daily Last dose 2-6-20      Magnesium 500 MG CAPS Take by mouth every evening Last dose 2-6-20       No current facility-administered medications for this visit. Allergies: Rice, Azmacort [triamcinolone], Doxycycline, Mepron [atovaquone], and Zithromax [azithromycin]     Review of Systems   Constitutional:  Negative for chills, fatigue, fever and unexpected weight change. Respiratory:  Negative for cough, shortness of breath and wheezing. Cardiovascular:  Negative for chest pain, palpitations and leg swelling. Gastrointestinal:  Negative for abdominal distention and abdominal pain. Genitourinary:  Negative for dysuria. Musculoskeletal:  Negative for arthralgias. Neurological:  Negative for weakness, light-headedness, numbness and headaches. All other systems reviewed and are negative.        Objective   BP (!) 150/80   Pulse 68   Temp 97.1 °F (36.2 °C) (Temporal)   Resp 20   Ht 4' 11.5\" (1.511 m)   Wt 124 lb (56.2 kg)   SpO2 98%   BMI 24.63 kg/m²       Lab Results   Component Value Date    LABA1C 5.7 (H) 10/19/2022    LABA1C 5.3 10/18/2021     Lab Results   Component Value Date    CHOL 256 (H) 10/19/2022    CHOL 247 (H) 10/18/2021     Lab Results   Component Value Date    TRIG 102 10/19/2022    TRIG 101 10/18/2021     Lab Results   Component Value Date    HDL 81 10/19/2022    HDL 76 10/18/2021     Lab Results   Component Value Date    LDLCALC 155 (H) 10/19/2022    LDLCALC 151 (H) 10/18/2021     Lab Results   Component Value Date    LABVLDL 20 10/19/2022    LABVLDL 20 10/18/2021     No results found for: CHOLHDLRATIO   Creatinine   Date Value Ref Range Status   10/19/2022 0.8 0.5 - 1.0 mg/dL Final   10/18/2021 0.8 0.5 - 1.0 mg/dL Final   06/20/2013 0.6 0.5 - 1.1 mg/dL Final       The 10-year ASCVD risk score (Mary ALEJO, et al., 2019) is: 34.7%    Values used to calculate the score:      Age: 68 years      Sex: Female      Is Non- : No      Diabetic: No      Tobacco smoker: No      Systolic Blood Pressure: 667 mmHg      Is BP treated: Yes      HDL Cholesterol: 81 mg/dL      Total Cholesterol: 256 mg/dL     Physical Exam  Constitutional:       General: She is not in acute distress. Appearance: Normal appearance. HENT:      Head: Normocephalic and atraumatic. Right Ear: External ear normal.      Nose: Nose normal.      Mouth/Throat:      Mouth: Mucous membranes are moist.   Eyes:      Extraocular Movements: Extraocular movements intact. Conjunctiva/sclera: Conjunctivae normal.   Cardiovascular:      Rate and Rhythm: Normal rate and regular rhythm. Pulses: Normal pulses. Heart sounds: No murmur heard. Pulmonary:      Effort: Pulmonary effort is normal.      Breath sounds: Normal breath sounds. No wheezing. Musculoskeletal:         General: Normal range of motion. Right lower leg: No edema. Left lower leg: No edema. Neurological:      Mental Status: She is alert.    Psychiatric:         Mood and Affect: Mood normal.         Behavior: Behavior normal.           An electronic signature was used to authenticate this note. --Dianelys Garcia MD       *NOTE: This report was transcribed using voice recognition software. Every effort was made to ensure accuracy; however, inadvertent computerized transcription errors may be present.

## 2023-02-28 ENCOUNTER — TELEPHONE (OUTPATIENT)
Dept: PAIN MANAGEMENT | Age: 78
End: 2023-02-28

## 2023-02-28 PROBLEM — M54.16 LUMBAR RADICULOPATHY: Status: ACTIVE | Noted: 2023-02-28

## 2023-02-28 NOTE — TELEPHONE ENCOUNTER
Call to Yifan Thomas that procedure was approved for 3/7/2023 and that Fanny should call her a few days before for the pre op call and between 2:00 PM and 4:00 PM  the business day before with the arrival time. Instructed Ericka Doradoleydi to hold ibuprofen for 24 hours, naprosyn for 4 days and any aspirin containing products or fish oil for 7 days. She takes aspirin even though it is not listed on her medication list.  She has been holding already. Instructed to call office back if any questions. Ericka Patel verbalized understanding.     Electronically signed by Yaquelin Figueroa RN on 2/28/2023 at 9:27 AM

## 2023-03-06 NOTE — PROGRESS NOTES
Patient states she has stopped taking her blood pressure medicine. Also states she feels as if she has some lingering effects from recent sinus infection, including cough and \"inner ear issues. \"  Instructed to call family doctor to report these things.

## 2023-03-06 NOTE — PROGRESS NOTES
[facility]  INSTRUCTIONS  . .......................................................................................................................................... [x] Parking the day of Surgery is located in the [facility].   Upon entering the door, make immediate right into the surgery reception room    [x]  Bring photo ID and insurance card     [x] You may have a light breakfast day of procedure    [x]  Wear loose comfortable clothing    [x]  Please follow instructions for medications as given per Dr's office    [x] You can expect a call the business day prior to procedure to notify you of your arrival time     [x] Please arrange for     []  Other instructions

## 2023-03-07 ENCOUNTER — HOSPITAL ENCOUNTER (OUTPATIENT)
Dept: GENERAL RADIOLOGY | Age: 78
Discharge: HOME | End: 2023-03-09
Attending: STUDENT IN AN ORGANIZED HEALTH CARE EDUCATION/TRAINING PROGRAM
Payer: MEDICARE

## 2023-03-07 ENCOUNTER — HOSPITAL ENCOUNTER (OUTPATIENT)
Age: 78
Setting detail: OUTPATIENT SURGERY
Discharge: HOME | End: 2023-03-07
Attending: STUDENT IN AN ORGANIZED HEALTH CARE EDUCATION/TRAINING PROGRAM | Admitting: STUDENT IN AN ORGANIZED HEALTH CARE EDUCATION/TRAINING PROGRAM
Payer: MEDICARE

## 2023-03-07 VITALS
DIASTOLIC BLOOD PRESSURE: 68 MMHG | HEIGHT: 60 IN | RESPIRATION RATE: 16 BRPM | TEMPERATURE: 97.8 F | BODY MASS INDEX: 22.97 KG/M2 | WEIGHT: 117 LBS | HEART RATE: 63 BPM | SYSTOLIC BLOOD PRESSURE: 144 MMHG | OXYGEN SATURATION: 97 %

## 2023-03-07 DIAGNOSIS — R52 PAIN MANAGEMENT: ICD-10-CM

## 2023-03-07 DIAGNOSIS — M54.16 LUMBAR RADICULOPATHY: ICD-10-CM

## 2023-03-07 PROCEDURE — 2709999900 HC NON-CHARGEABLE SUPPLY: Performed by: STUDENT IN AN ORGANIZED HEALTH CARE EDUCATION/TRAINING PROGRAM

## 2023-03-07 PROCEDURE — 6360000004 HC RX CONTRAST MEDICATION: Performed by: STUDENT IN AN ORGANIZED HEALTH CARE EDUCATION/TRAINING PROGRAM

## 2023-03-07 PROCEDURE — 62323 NJX INTERLAMINAR LMBR/SAC: CPT | Performed by: STUDENT IN AN ORGANIZED HEALTH CARE EDUCATION/TRAINING PROGRAM

## 2023-03-07 PROCEDURE — 2500000003 HC RX 250 WO HCPCS: Performed by: STUDENT IN AN ORGANIZED HEALTH CARE EDUCATION/TRAINING PROGRAM

## 2023-03-07 PROCEDURE — 6370000000 HC RX 637 (ALT 250 FOR IP): Performed by: STUDENT IN AN ORGANIZED HEALTH CARE EDUCATION/TRAINING PROGRAM

## 2023-03-07 PROCEDURE — 6360000002 HC RX W HCPCS: Performed by: STUDENT IN AN ORGANIZED HEALTH CARE EDUCATION/TRAINING PROGRAM

## 2023-03-07 PROCEDURE — 3209999900 FLUORO FOR SURGICAL PROCEDURES

## 2023-03-07 PROCEDURE — 3600000002 HC SURGERY LEVEL 2 BASE: Performed by: STUDENT IN AN ORGANIZED HEALTH CARE EDUCATION/TRAINING PROGRAM

## 2023-03-07 PROCEDURE — 7100000011 HC PHASE II RECOVERY - ADDTL 15 MIN: Performed by: STUDENT IN AN ORGANIZED HEALTH CARE EDUCATION/TRAINING PROGRAM

## 2023-03-07 PROCEDURE — 7100000010 HC PHASE II RECOVERY - FIRST 15 MIN: Performed by: STUDENT IN AN ORGANIZED HEALTH CARE EDUCATION/TRAINING PROGRAM

## 2023-03-07 RX ORDER — DIAZEPAM 5 MG/1
5 TABLET ORAL ONCE
Status: COMPLETED | OUTPATIENT
Start: 2023-03-07 | End: 2023-03-07

## 2023-03-07 RX ORDER — LIDOCAINE HYDROCHLORIDE 5 MG/ML
INJECTION, SOLUTION INFILTRATION; INTRAVENOUS PRN
Status: DISCONTINUED | OUTPATIENT
Start: 2023-03-07 | End: 2023-03-07 | Stop reason: ALTCHOICE

## 2023-03-07 RX ORDER — SODIUM CHLORIDE 0.9 % (FLUSH) 0.9 %
5-40 SYRINGE (ML) INJECTION EVERY 12 HOURS SCHEDULED
Status: DISCONTINUED | OUTPATIENT
Start: 2023-03-07 | End: 2023-03-07 | Stop reason: HOSPADM

## 2023-03-07 RX ORDER — METHYLPREDNISOLONE ACETATE 40 MG/ML
INJECTION, SUSPENSION INTRA-ARTICULAR; INTRALESIONAL; INTRAMUSCULAR; SOFT TISSUE PRN
Status: DISCONTINUED | OUTPATIENT
Start: 2023-03-07 | End: 2023-03-07 | Stop reason: ALTCHOICE

## 2023-03-07 RX ORDER — SODIUM CHLORIDE 0.9 % (FLUSH) 0.9 %
5-40 SYRINGE (ML) INJECTION PRN
Status: DISCONTINUED | OUTPATIENT
Start: 2023-03-07 | End: 2023-03-07 | Stop reason: HOSPADM

## 2023-03-07 RX ORDER — SODIUM CHLORIDE 9 MG/ML
INJECTION, SOLUTION INTRAVENOUS PRN
Status: DISCONTINUED | OUTPATIENT
Start: 2023-03-07 | End: 2023-03-07 | Stop reason: HOSPADM

## 2023-03-07 RX ADMIN — DIAZEPAM 5 MG: 5 TABLET ORAL at 09:08

## 2023-03-07 NOTE — OP NOTE
3/7/2023    Patient: Ursula Rodriguez  :    Age:  68 y.o. Sex:  female     PRE-OPERATIVE DIAGNOSIS: Lumbar disc displacement, lumbar radiculopathy. POST-OPERATIVE DIAGNOSIS: Same. PROCEDURE: Fluoroscopic guided therapeutic lumbar epidural steroid injection at the L4/5 level (# 1). SURGEON:  Sandie Dsouza MD    ANESTHESIA: Local    ESTIMATED BLOOD LOSS: None.  ______________________________________________________________________    BRIEF HISTORY:  Ursula Rodriguez comes in today for first lumbar epidural injection at L4/5 level. The potential complications of this procedure were discussed with her again today. She has elected to undergo the aforementioned procedure. Karla complete History & Physical examination were reviewed in depth, a copy of which is in the chart. DESCRIPTION OF PROCEDURE:    After confirming written and informed consent, a time-out was performed and Karla name and date of birth, the procedure to be performed as well as the plan for the location of the needle insertion were confirmed. The patient was brought into the procedure room and placed in the prone position on the fluoroscopy table. A pillow was placed under the patient's lower abdomen/upper pelvis to increase lumbar interlaminar space. Standard monitors were placed, and vital signs were observed throughout the procedure. The area of the lumbar spine was prepped with chloraprep and draped in a sterile manner. The L4.5 interspace was identified and marked under AP fluoroscopy. The skin and subcutaneous tissues at the above level were anesthestized with 0.5% lidocaine. With intermittent fluoroscopy, an # 18 gauge 3 1/2 tuohy epidural needle was inserted and directed toward the interlaminar space. The needle was slowly advanced using loss of resistance technique and 5 cc glass syringe  until the tip of the epidural needle has passed through the ligamentum flavum and entered the epidural space.  AP and lateral fluoroscopic imaging is performed to verify that the epidural needle is properly placed. Negative aspiration of blood and CSF was confirmed. 1 ml of Isovue - M 300  was used for confirmation of even epidural spread under both live and AP fluoroscopy. After negative aspiration, a solution of 3 mL of 0.5 % Lidocaine and 40 mg DepoMedrol was easily injected. The needle was gently removed intact . The patient back was cleaned and a Band-Aid was placed over the needle insertion point. Disposition the patient tolerated the procedure well and there were no complications . Vital signs remained stable throughout the procedure. The patient was escorted to the recovery area where they remained until discharge and written discharge instructions for the procedure were given. Plan: Payton Epstein will return to our pain medicine center as scheduled.      Jenny Amaya MD

## 2023-03-07 NOTE — H&P
[address]  Pain Medicine  [facility]    Procedure History & Physical      Tammy Pittman     HPI:    Patient  is here for LBP, B/L LE Pain for L4/5 SAMUEL  Labs/imaging studies reviewed   All question and concerns addressed including R/B/A associated with the procedure    Past Medical History:   Diagnosis Date    Arthritis     Babesiosis 2009    tick disease; treated    Endometrial cancer ([address] 11/21 2019 approx    new dx    Hyperlipidemia     Sleep apnea        Past Surgical History:   Procedure Laterality Date    BUNIONECTOMY  2006 2007    bilateral    CARPAL TUNNEL RELEASE Bilateral     COLONOSCOPY      COLONOSCOPY N/A 12/04/2019    COLONOSCOPY WITH BIOPSY performed by Caryle Breath, MD at [place] [address]  2010    right hand    FINGER TRIGGER RELEASE Left 12/18/2018    left ring finger. HYSTERECTOMY (CERVIX STATUS UNKNOWN)  12/17/2019    [facility]    INJECTION Right 06/2021    Patient had right knee injection    OTHER SURGICAL HISTORY Right 07/03/2015    excision mass right upper arm    PORT SURGERY N/A 02/11/2020    INSERTION OF A POWER PORT performed by Shaun Shen MD at [address]       Prior to Admission medications    Medication Sig Start Date End Date Taking? Authorizing Provider   lisinopril (PRINIVIL;ZESTRIL) 5 MG tablet Take 1 tablet by mouth daily 2/16/23   Elise Prado MD   gabapentin (NEURONTIN) 100 MG capsule Take 2 capsules by mouth 3 times daily for 90 days.  Intended supply: 90 days 2/16/23 5/17/23  Elise Prado MD   albuterol sulfate HFA (VENTOLIN HFA) 108 (90 Base) MCG/ACT inhaler Inhale 2 puffs into the lungs 4 times daily as needed for Wheezing 2/6/23   LINO Baig - CNP   ibuprofen (ADVIL;MOTRIN) 100 MG/5ML suspension  11/5/22   Historical Provider, MD   turmeric 500 MG CAPS Take by mouth daily    Historical Provider, MD   Misc Natural Products (GLUCOSAMINE CHOND COMPLEX/MSM) TABS Take 1 tablet by mouth daily    Historical Provider, MD   acetaminophen (TYLENOL) 325 MG tablet Take 650 mg by mouth every 6 hours as needed for Pain    Historical Provider, MD   vitamin D-3 (CHOLECALCIFEROL) 5000 UNITS TABS Take 5,000 Units by mouth daily Last dose 2-6-20    Historical Provider, MD   Magnesium 500 MG CAPS Take by mouth every evening Last dose 2-6-20    Historical Provider, MD       Allergies   Allergen Reactions    Azmacort [Triamcinolone] Swelling    Doxycycline Rash    Mepron [Atovaquone] Rash    Zithromax [Azithromycin] Rash       Social History     Socioeconomic History    Marital status:      Spouse name: Not on file    Number of children: Not on file    Years of education: Not on file    Highest education level: Not on file   Occupational History    Occupation: retired-nurse   Tobacco Use    Smoking status: Never    Smokeless tobacco: Never   Vaping Use    Vaping Use: Never used   Substance and Sexual Activity    Alcohol use: Yes     Alcohol/week: 0.0 standard drinks     Comment: rarely    Drug use: No    Sexual activity: Not on file   Other Topics Concern    Not on file   Social History Narrative    Not on file     Social Determinants of Health     Financial Resource Strain: Low Risk     Difficulty of Paying Living Expenses: Not hard at all   Food Insecurity: No Food Insecurity    Worried About Running Out of Food in the Last Year: Never true    [address] in the Last Year: Never true   Transportation Needs: No Transportation Needs    Lack of Transportation (Medical): No    Lack of Transportation (Non-Medical): No   Physical Activity: Insufficiently Active    Days of Exercise per Week: 2 days    Minutes of Exercise per Session: 40 min   Stress: No Stress Concern Present    Feeling of Stress : Not at all   Social Connections:  Moderately Integrated    Frequency of Communication with Friends and Family: More than three times a week    Frequency of Social Gatherings with Friends and Family: More than three times a week    Attends [employer] Services: More than 4 times per year    Active Member of Clubs or Organizations: Yes    Attends Club or Organization Meetings: More than 4 times per year    Marital Status:    Intimate Partner Violence: Not At Risk    Fear of Current or Ex-Partner: No    Emotionally Abused: No    Physically Abused: No    Sexually Abused: No   Housing Stability: Low Risk     Unable to Pay for Housing in the Last Year: No    Number of Places Lived in the Last Year: 0    Unstable Housing in the Last Year: No       Family History   Problem Relation Age of Onset    Other Mother     Other Father          REVIEW OF SYSTEMS:    CONSTITUTIONAL:  negative for  fevers, chills, sweats and fatigue    RESPIRATORY:  negative for  dry cough, cough with sputum, dyspnea, wheezing and chest pain    CARDIOVASCULAR:  negative for chest pain, dyspnea, palpitations, syncope    GASTROINTESTINAL:  negative for nausea, vomiting, change in bowel habits, diarrhea, constipation and abdominal pain    MUSCULOSKELETAL: negative for muscle weakness    SKIN: negative for itching or rashes. BEHAVIOR/PSYCH:  negative for poor appetite, increased appetite, decreased sleep and poor concentration    All other systems negative      PHYSICAL EXAM:    VITALS:  BP (!) 213/83   Pulse 74   Temp 97.5 °F (36.4 °C)   Resp 15   Ht 5' (1.524 m)   Wt 117 lb (53.1 kg)   SpO2 98%   BMI 22.85 kg/m²     CONSTITUTIONAL:  awake, alert, cooperative, no apparent distress, and appears stated age    EYES: PERRLA, EOMI    LUNGS:  No increased work of breathing, no audible wheezing    CARDIOVASCULAR:  regular rate and rhythm    ABDOMEN:  Soft non tender non distended     EXTREMITIES: no signs of clubbing or cyanosis. MUSCULOSKELETAL: negative for flaccid muscle tone or spastic movements.     SKIN: gross examination reveals no signs of rashes, or diaphoresis. NEURO: Cranial nerves II-XII grossly intact. No signs of agitated mood.        Assessment/Plan:    LBP, B/L LE Pain for L4/5 SAMUEL

## 2023-03-07 NOTE — DISCHARGE INSTRUCTIONS
Shannan Olson [facility]  Home Going Instructions    1-Go home, rest for the remainder of the day  2-Please do not lift over 20 pounds the day of the injection  3-If you received sedation No: alcohol, driving, operating lawn mowers, plows, tractors or other dangerous equipment until next morning. Do not make important decisions or sign legal documents for 24 hours. You may experience light headedness, dizziness, nausea or sleepiness after sedation. Do not stay alone. A responsible adult must be with you for 24 hours. You could be nauseated from the medications you have received. Your IV site may be sore and bruised. 4-No dietary restrictions     5-Resume all medications the same day, blood thinners to be resumed 24 hours after injection if you were instructed to stop any. 6-Keep the surgical site clean and dry, you may shower the next morning and remove the      dressing. 7- No sitz baths, tub baths or hot tubs/swimming for 24 hours. 8- If you have any pain at the injection site(s), application of an ice pack to the area should be       helpful, 20 minutes on/20 minutes off for next 48 hours. 9- Call [facility] immediately at if you develop.   Fever greater than 100.4 F  Have bleeding or drainage from the puncture site  Have progressive Leg/arm numbness and or weakness  Loss of control of bowel and or bladder (wet/soil yourself)  Severe headache with inability to lift head  10-You may return to work the next day

## 2023-03-09 ENCOUNTER — OFFICE VISIT (OUTPATIENT)
Dept: FAMILY MEDICINE CLINIC | Age: 78
End: 2023-03-09

## 2023-03-09 VITALS
HEART RATE: 66 BPM | HEIGHT: 60 IN | TEMPERATURE: 97.8 F | BODY MASS INDEX: 23.75 KG/M2 | OXYGEN SATURATION: 100 % | DIASTOLIC BLOOD PRESSURE: 80 MMHG | RESPIRATION RATE: 19 BRPM | SYSTOLIC BLOOD PRESSURE: 168 MMHG | WEIGHT: 121 LBS

## 2023-03-09 DIAGNOSIS — R05.2 SUBACUTE COUGH: ICD-10-CM

## 2023-03-09 DIAGNOSIS — J30.9 ALLERGIC RHINITIS, UNSPECIFIED SEASONALITY, UNSPECIFIED TRIGGER: ICD-10-CM

## 2023-03-09 DIAGNOSIS — I10 WHITE COAT SYNDROME WITH DIAGNOSIS OF HYPERTENSION: ICD-10-CM

## 2023-03-09 DIAGNOSIS — R42 LIGHTHEADED: Primary | ICD-10-CM

## 2023-03-09 DIAGNOSIS — H93.A9 VASCULAR ORIGIN TINNITUS: ICD-10-CM

## 2023-03-09 DIAGNOSIS — H81.12 BENIGN PAROXYSMAL POSITIONAL VERTIGO OF LEFT EAR: ICD-10-CM

## 2023-03-09 RX ORDER — FLUTICASONE PROPIONATE 50 MCG
2 SPRAY, SUSPENSION (ML) NASAL DAILY
Qty: 16 G | Refills: 4 | Status: SHIPPED | OUTPATIENT
Start: 2023-03-09

## 2023-03-09 ASSESSMENT — ENCOUNTER SYMPTOMS
COUGH: 0
ABDOMINAL DISTENTION: 0
WHEEZING: 0
SHORTNESS OF BREATH: 0
ABDOMINAL PAIN: 0

## 2023-03-09 NOTE — PROGRESS NOTES
Valarie Brock (:  1945) is a 68 y.o. female, established patient follow up , here for evaluation of the following:  Cough (Pt stopped taking her new bp medication and starting coughing ) and Dizziness (Pt is having issues with balance recently )         ASSESSMENT/PLAN      1. Lightheaded  She does have increasing lightheadedness, there is also a component of vertigo, possibility that this is secondary to medications, will stop the lisinopril and the gabapentin, she is already stopped lisinopril for a few weeks, blood pressures at home are good, she does have whitecoat hypertension, possibility that it was just going too low at home and causing the symptoms, she will continue to check her blood pressure at home and especially if she is symptomatic to make sure it is not due to hypertension, she also has lumbar anatomic abnormalities and did get a epidural injection, there is possibility that some of this is disequilibrium caused by decreased proprioception and/or the damage to her lumbar spine, she declined physical therapy at this time for this, she also has that component of BPPV which we can see on the maneuver when she lays back to the left, did provide her with Epley maneuver and also due to the vascular tinnitus on the left as well and the allergic rhinitis prescribed Flonase, hopefully between the Flonase and the maneuvers and the discontinuation of the medication she will have resolution of the vertigo, disequilibrium, and lightheadedness, she also does have calcification seen on CT abdomen along with elevated ASCVD risk, she does decline statin medication, can consider head imaging in the future however she does not have any neurologic deficits today  2. Benign paroxysmal positional vertigo of left ear  -     fluticasone (FLONASE) 50 MCG/ACT nasal spray; 2 sprays by Each Nostril route daily, Disp-16 g, R-4Normal  3.  Allergic rhinitis, unspecified seasonality, unspecified trigger  - fluticasone (FLONASE) 50 MCG/ACT nasal spray; 2 sprays by Each Nostril route daily, Disp-16 g, R-4Normal  4. Subacute cough  5. Vascular origin tinnitus  6. White coat syndrome with diagnosis of hypertension  Return in about 3 months (around 6/9/2023) for at next visit . Subjective   SUBJECTIVE/OBJECTIVE:  HPI    She was on lisinopril and she wonders if it casues cough and imbalance. She has been off med for a week and she is still coughing. She notes cough is not wet. It is dry. She notes her sinuses are draining. She doesn't thinks Claritin is helping. She has been taking gabapentin at bedtime. She wonders if it is contributing and not sure its helping. She does have room spinning when she looks to the left or rolls. She did get a lumbar injection 3/7/2023. She notes it helped. Declined preventative screening identified as care gaps unless ordered through this visit    PHQ2/PHQ9      No data recorded     Past Medical History:  has a past medical history of Arthritis, Babesiosis, Endometrial cancer ([facility] [address] Hyperlipidemia, and Sleep apnea. Past Surgical History:  has a past surgical history that includes Tubal ligation (1980); Tonsillectomy (1947); Bunionectomy (2006 2007); cyst removal (2010); Colonoscopy; other surgical history (Right, 07/03/2015); Finger trigger release (Left, 12/18/2018); Colonoscopy (N/A, 12/04/2019); Carpal tunnel release (Bilateral); Hysterectomy (12/17/2019); [address] Surgery (N/A, 02/11/2020); Injection (Right, 06/2021); Port Surgery; and Pain management procedure (N/A, 3/7/2023). Social History:  reports that she has never smoked. She has never used smokeless tobacco. She reports current alcohol use. She reports that she does not use drugs. Family History: family history includes Other in her father and mother.   Allergies: Azmacort [triamcinolone], Doxycycline, Mepron [atovaquone], and Zithromax [azithromycin]  Medications:   Current Outpatient Medications   Medication Sig Dispense Refill    fluticasone (FLONASE) 50 MCG/ACT nasal spray 2 sprays by Each Nostril route daily 16 g 4    turmeric 500 MG CAPS Take by mouth daily      Misc Natural Products (GLUCOSAMINE CHOND COMPLEX/MSM) TABS Take 1 tablet by mouth daily      acetaminophen (TYLENOL) 325 MG tablet Take 650 mg by mouth every 6 hours as needed for Pain      vitamin D-3 (CHOLECALCIFEROL) 5000 UNITS TABS Take 5,000 Units by mouth daily Last dose 2-6-20      Magnesium 500 MG CAPS Take by mouth every evening Last dose 2-6-20       No current facility-administered medications for this visit. Allergies: Azmacort [triamcinolone], Doxycycline, Mepron [atovaquone], and Zithromax [azithromycin]     Review of Systems   Constitutional:  Negative for chills, fatigue, fever and unexpected weight change. Respiratory:  Negative for cough, shortness of breath and wheezing. Cardiovascular:  Negative for chest pain, palpitations and leg swelling. Gastrointestinal:  Negative for abdominal distention and abdominal pain. Genitourinary:  Negative for dysuria. Musculoskeletal:  Negative for arthralgias. Neurological:  Positive for dizziness and light-headedness. Negative for weakness, numbness and headaches. All other systems reviewed and are negative.        Objective   BP (!) 168/80   Pulse 66   Temp 97.8 °F (36.6 °C)   Resp 19   Ht 5' (1.524 m)   Wt 121 lb (54.9 kg)   SpO2 100%   BMI 23.63 kg/m²       Lab Results   Component Value Date    LABA1C 5.7 (H) 10/19/2022    LABA1C 5.3 10/18/2021     Lab Results   Component Value Date    CHOL 256 (H) 10/19/2022    CHOL 247 (H) 10/18/2021     Lab Results   Component Value Date    TRIG 102 10/19/2022    TRIG 101 10/18/2021     Lab Results   Component Value Date    HDL 81 10/19/2022    HDL 76 10/18/2021     Lab Results   Component Value Date    LDLCALC 155 (H) 10/19/2022    LDLCALC 151 (H) 10/18/2021     Lab Results   Component Value Date    LABVLDL 20 10/19/2022    LABVLDL 20 10/18/2021     No results found for: CHOLHDLRATIO   Creatinine   Date Value Ref Range Status   10/19/2022 0.8 0.5 - 1.0 mg/dL Final   10/18/2021 0.8 0.5 - 1.0 mg/dL Final   06/20/2013 0.6 0.5 - 1.1 mg/dL Final       The 10-year ASCVD risk score (Mary ALEJO, et al., 2019) is: 32.3%    Values used to calculate the score:      Age: 68 years      Sex: Female      Is Non- : No      Diabetic: No      Tobacco smoker: No      Systolic Blood Pressure: 951 mmHg      Is BP treated: No      HDL Cholesterol: 81 mg/dL      Total Cholesterol: 256 mg/dL     Physical Exam  Constitutional:       Appearance: Normal appearance. Cardiovascular:      Rate and Rhythm: Normal rate and regular rhythm. Pulses: Normal pulses. Pulmonary:      Effort: Pulmonary effort is normal.      Breath sounds: Normal breath sounds. Musculoskeletal:         General: Normal range of motion. Right lower leg: No edema. Left lower leg: No edema. Neurological:      Mental Status: She is alert and oriented to person, place, and time. Cranial Nerves: No cranial nerve deficit, dysarthria or facial asymmetry. Sensory: Sensation is intact. Motor: Motor function is intact. No weakness or tremor. Coordination: Coordination is intact. Romberg sign negative. Coordination normal. Finger-Nose-Finger Test normal.      Gait: Gait is intact. Gait and tandem walk normal.   Psychiatric:         Mood and Affect: Mood normal.         Behavior: Behavior normal.           An electronic signature was used to authenticate this note. --Shari Castorena MD       *NOTE: This report was transcribed using voice recognition software. Every effort was made to ensure accuracy; however, inadvertent computerized transcription errors may be present.

## 2023-04-04 ENCOUNTER — OFFICE VISIT (OUTPATIENT)
Dept: PAIN MANAGEMENT | Age: 78
End: 2023-04-04
Payer: MEDICARE

## 2023-04-04 VITALS
DIASTOLIC BLOOD PRESSURE: 68 MMHG | HEIGHT: 60 IN | HEART RATE: 71 BPM | OXYGEN SATURATION: 95 % | SYSTOLIC BLOOD PRESSURE: 161 MMHG | WEIGHT: 121 LBS | TEMPERATURE: 98.2 F | BODY MASS INDEX: 23.75 KG/M2 | RESPIRATION RATE: 16 BRPM

## 2023-04-04 DIAGNOSIS — Z85.42 HISTORY OF ENDOMETRIAL CANCER: ICD-10-CM

## 2023-04-04 DIAGNOSIS — G89.4 CHRONIC PAIN SYNDROME: ICD-10-CM

## 2023-04-04 DIAGNOSIS — M48.062 SPINAL STENOSIS OF LUMBAR REGION WITH NEUROGENIC CLAUDICATION: ICD-10-CM

## 2023-04-04 DIAGNOSIS — M54.16 LUMBAR RADICULOPATHY: Primary | ICD-10-CM

## 2023-04-04 PROCEDURE — 1123F ACP DISCUSS/DSCN MKR DOCD: CPT | Performed by: STUDENT IN AN ORGANIZED HEALTH CARE EDUCATION/TRAINING PROGRAM

## 2023-04-04 PROCEDURE — 99213 OFFICE O/P EST LOW 20 MIN: CPT | Performed by: STUDENT IN AN ORGANIZED HEALTH CARE EDUCATION/TRAINING PROGRAM

## 2023-04-04 PROCEDURE — G8420 CALC BMI NORM PARAMETERS: HCPCS | Performed by: STUDENT IN AN ORGANIZED HEALTH CARE EDUCATION/TRAINING PROGRAM

## 2023-04-04 PROCEDURE — 1036F TOBACCO NON-USER: CPT | Performed by: STUDENT IN AN ORGANIZED HEALTH CARE EDUCATION/TRAINING PROGRAM

## 2023-04-04 PROCEDURE — 1090F PRES/ABSN URINE INCON ASSESS: CPT | Performed by: STUDENT IN AN ORGANIZED HEALTH CARE EDUCATION/TRAINING PROGRAM

## 2023-04-04 PROCEDURE — G8400 PT W/DXA NO RESULTS DOC: HCPCS | Performed by: STUDENT IN AN ORGANIZED HEALTH CARE EDUCATION/TRAINING PROGRAM

## 2023-04-04 PROCEDURE — G8427 DOCREV CUR MEDS BY ELIG CLIN: HCPCS | Performed by: STUDENT IN AN ORGANIZED HEALTH CARE EDUCATION/TRAINING PROGRAM

## 2023-04-04 PROCEDURE — 3078F DIAST BP <80 MM HG: CPT | Performed by: STUDENT IN AN ORGANIZED HEALTH CARE EDUCATION/TRAINING PROGRAM

## 2023-04-04 PROCEDURE — 3077F SYST BP >= 140 MM HG: CPT | Performed by: STUDENT IN AN ORGANIZED HEALTH CARE EDUCATION/TRAINING PROGRAM

## 2023-04-04 NOTE — PROGRESS NOTES
Jody Gagnon presents to the [facility] on 4/4/2023. Daysi Cesar is complaining of pain low back . The pain is intermittent. The pain is described as aching. Pain is rated on her best day at a 2, on her worst day at a 6, and on average at a 4 on the VAS scale. She took her last dose of  nothing  . Any procedures since your last visit: Yes, with 80 % relief. Pacemaker or defibrillator: No   She is not on NSAIDS and is not on anticoagulation medications   Medication Contract and Consent for Opioid Use Documents Filed        No documents found                    BP (!) 161/68   Pulse 71   Temp 98.2 °F (36.8 °C) (Temporal)   Resp 16   Ht 5' (1.524 m)   Wt 121 lb (54.9 kg)   SpO2 95%   BMI 23.63 kg/m²      No LMP recorded. Patient has had a hysterectomy.
abdomen and pelvis. Large amount of stool present throughout the  colon. No retroperitoneal lymphadenopathy. No renal or ureteric calculus. No hydronephrosis. The liver,  gallbladder, pancreas, and spleen are unremarkable. Bilateral adrenal  glands are unremarkable. Urinary bladder is nondistended. Appendix is  visualized and unremarkable. View of the lung bases shows no airspace opacities or pleural effusion. Impression-  1. No evidence of bowel obstruction, free air, or free fluid in the  abdomen or pelvis. 2. No renal or ureteric calculus. 3. Large amount of stool throughout the colon. If there are any physician concerns regarding this report, a  Radiologist can be reached by calling the following number [phone]. - RXM  Read Dony RUIZ Released ByLuh RODRÍGUEZ OAnton   Released Date Time- 06/20/13 1014  ------------------------------------------------------------------------------  # CTs in 12 months  - 0      EMG:    Pertinent Labs:   Lab Results   Component Value Date/Time    BUN 17 10/19/2022 12:00 PM    CREATININE 0.8 10/19/2022 12:00 PM    GLUCOSE 100 10/19/2022 12:00 PM    AST 31 10/19/2022 12:00 PM    ALT 28 10/19/2022 12:00 PM    LABA1C 5.7 10/19/2022 12:00 PM     10/19/2022 12:00 PM       Potential Aberrant Drug-Related Behavior:  no     Urine Drug Screening:    No results found for: [address] [facility], [facility], [address] [facility], [address] [place] [address]. [facility] [address], [address] [place], [place] [address]in Fabrizio Bateliers    Past Medical History:   Diagnosis Date    Arthritis     Babesiosis 2009    tick disease; treated    Endometrial cancer ([facility] [address] 11/21 2019 approx    new dx    Hyperlipidemia     Sleep apnea        Past Surgical History:   Procedure Laterality Date    BUNIONECTOMY  2006 2007    bilateral    CARPAL TUNNEL RELEASE Bilateral     COLONOSCOPY      COLONOSCOPY N/A 12/04/2019    COLONOSCOPY WITH BIOPSY performed by Rae Hadley,

## 2023-04-20 ENCOUNTER — OFFICE VISIT (OUTPATIENT)
Dept: FAMILY MEDICINE CLINIC | Age: 78
End: 2023-04-20

## 2023-04-20 VITALS
OXYGEN SATURATION: 99 % | RESPIRATION RATE: 20 BRPM | HEART RATE: 76 BPM | HEIGHT: 60 IN | SYSTOLIC BLOOD PRESSURE: 146 MMHG | TEMPERATURE: 96.8 F | BODY MASS INDEX: 23.56 KG/M2 | DIASTOLIC BLOOD PRESSURE: 66 MMHG | WEIGHT: 120 LBS

## 2023-04-20 DIAGNOSIS — R73.09 ELEVATED HEMOGLOBIN A1C: ICD-10-CM

## 2023-04-20 DIAGNOSIS — L50.9 URTICARIA: ICD-10-CM

## 2023-04-20 DIAGNOSIS — R41.3 MEMORY LOSS: ICD-10-CM

## 2023-04-20 DIAGNOSIS — Z00.00 MEDICARE ANNUAL WELLNESS VISIT, SUBSEQUENT: Primary | ICD-10-CM

## 2023-04-20 DIAGNOSIS — E55.9 VITAMIN D DEFICIENCY: ICD-10-CM

## 2023-04-20 DIAGNOSIS — Z71.89 ACP (ADVANCE CARE PLANNING): ICD-10-CM

## 2023-04-20 DIAGNOSIS — H93.13 TINNITUS OF BOTH EARS: ICD-10-CM

## 2023-04-20 DIAGNOSIS — M54.17 LUMBOSACRAL RADICULOPATHY: ICD-10-CM

## 2023-04-20 DIAGNOSIS — I10 WHITE COAT SYNDROME WITH DIAGNOSIS OF HYPERTENSION: ICD-10-CM

## 2023-04-20 DIAGNOSIS — I10 PRIMARY HYPERTENSION: ICD-10-CM

## 2023-04-20 LAB
ALBUMIN SERPL-MCNC: 4.5 G/DL (ref 3.5–5.2)
ALP SERPL-CCNC: 85 U/L (ref 35–104)
ALT SERPL-CCNC: 17 U/L (ref 0–32)
ANION GAP SERPL CALCULATED.3IONS-SCNC: 13 MMOL/L (ref 7–16)
AST SERPL-CCNC: 31 U/L (ref 0–31)
BASOPHILS # BLD: 0.01 E9/L (ref 0–0.2)
BASOPHILS NFR BLD: 0.2 % (ref 0–2)
BILIRUB SERPL-MCNC: 0.3 MG/DL (ref 0–1.2)
BUN SERPL-MCNC: 12 MG/DL (ref 6–23)
CALCIUM SERPL-MCNC: 9.4 MG/DL (ref 8.6–10.2)
CHLORIDE SERPL-SCNC: 104 MMOL/L (ref 98–107)
CO2 SERPL-SCNC: 23 MMOL/L (ref 22–29)
CREAT SERPL-MCNC: 0.7 MG/DL (ref 0.5–1)
EOSINOPHIL # BLD: 0.14 E9/L (ref 0.05–0.5)
EOSINOPHIL NFR BLD: 3.2 % (ref 0–6)
ERYTHROCYTE [DISTWIDTH] IN BLOOD BY AUTOMATED COUNT: 13.2 FL (ref 11.5–15)
FOLATE SERPL-MCNC: 10 NG/ML (ref 4.8–24.2)
GLUCOSE SERPL-MCNC: 85 MG/DL (ref 74–99)
HBA1C MFR BLD: 5.1 % (ref 4–5.6)
HCT VFR BLD AUTO: 36.4 % (ref 34–48)
HGB BLD-MCNC: 11.9 G/DL (ref 11.5–15.5)
IMM GRANULOCYTES # BLD: 0.02 E9/L
IMM GRANULOCYTES NFR BLD: 0.5 % (ref 0–5)
LYMPHOCYTES # BLD: 1.07 E9/L (ref 1.5–4)
LYMPHOCYTES NFR BLD: 24.8 % (ref 20–42)
MCH RBC QN AUTO: 31.3 PG (ref 26–35)
MCHC RBC AUTO-ENTMCNC: 32.7 % (ref 32–34.5)
MCV RBC AUTO: 95.8 FL (ref 80–99.9)
MONOCYTES # BLD: 0.59 E9/L (ref 0.1–0.95)
MONOCYTES NFR BLD: 13.7 % (ref 2–12)
NEUTROPHILS # BLD: 2.48 E9/L (ref 1.8–7.3)
NEUTS SEG NFR BLD: 57.6 % (ref 43–80)
PLATELET # BLD AUTO: 295 E9/L (ref 130–450)
PMV BLD AUTO: 9.6 FL (ref 7–12)
POTASSIUM SERPL-SCNC: 4.6 MMOL/L (ref 3.5–5)
PROT SERPL-MCNC: 7 G/DL (ref 6.4–8.3)
RBC # BLD AUTO: 3.8 E12/L (ref 3.5–5.5)
SODIUM SERPL-SCNC: 140 MMOL/L (ref 132–146)
T4 FREE SERPL-MCNC: 1.23 NG/DL (ref 0.93–1.7)
TSH SERPL-MCNC: 1.21 UIU/ML (ref 0.27–4.2)
VIT B12 SERPL-MCNC: 541 PG/ML (ref 211–946)
VITAMIN D 25-HYDROXY: 70 NG/ML (ref 30–100)
WBC # BLD: 4.3 E9/L (ref 4.5–11.5)

## 2023-04-20 RX ORDER — METHYLPREDNISOLONE 4 MG/1
TABLET ORAL
Qty: 1 KIT | Refills: 0 | Status: SHIPPED | OUTPATIENT
Start: 2023-04-20 | End: 2023-04-26

## 2023-04-20 SDOH — ECONOMIC STABILITY: FOOD INSECURITY: WITHIN THE PAST 12 MONTHS, YOU WORRIED THAT YOUR FOOD WOULD RUN OUT BEFORE YOU GOT MONEY TO BUY MORE.: NEVER TRUE

## 2023-04-20 SDOH — ECONOMIC STABILITY: FOOD INSECURITY: WITHIN THE PAST 12 MONTHS, THE FOOD YOU BOUGHT JUST DIDN'T LAST AND YOU DIDN'T HAVE MONEY TO GET MORE.: NEVER TRUE

## 2023-04-20 SDOH — ECONOMIC STABILITY: INCOME INSECURITY: HOW HARD IS IT FOR YOU TO PAY FOR THE VERY BASICS LIKE FOOD, HOUSING, MEDICAL CARE, AND HEATING?: NOT HARD AT ALL

## 2023-04-20 ASSESSMENT — ENCOUNTER SYMPTOMS
WHEEZING: 0
SHORTNESS OF BREATH: 0
ABDOMINAL DISTENTION: 0
COUGH: 0
ABDOMINAL PAIN: 0

## 2023-04-20 ASSESSMENT — PATIENT HEALTH QUESTIONNAIRE - PHQ9
1. LITTLE INTEREST OR PLEASURE IN DOING THINGS: 0
SUM OF ALL RESPONSES TO PHQ QUESTIONS 1-9: 0
2. FEELING DOWN, DEPRESSED OR HOPELESS: 0
SUM OF ALL RESPONSES TO PHQ9 QUESTIONS 1 & 2: 0
SUM OF ALL RESPONSES TO PHQ QUESTIONS 1-9: 0

## 2023-04-20 ASSESSMENT — LIFESTYLE VARIABLES
HOW MANY STANDARD DRINKS CONTAINING ALCOHOL DO YOU HAVE ON A TYPICAL DAY: PATIENT DOES NOT DRINK
HOW OFTEN DO YOU HAVE A DRINK CONTAINING ALCOHOL: NEVER

## 2023-04-20 NOTE — PATIENT INSTRUCTIONS
Preventive Care list are included within your After Visit Summary for your review. Other Preventive Recommendations:    A preventive eye exam performed by an eye specialist is recommended every 1-2 years to screen for glaucoma; cataracts, macular degeneration, and other eye disorders. A preventive dental visit is recommended every 6 months. Try to get at least 150 minutes of exercise per week or 10,000 steps per day on a pedometer . Order or download the FREE \"Exercise & Physical Activity: Your Everyday Guide\" from [employer]. Call 0-529.851.1759 or search [employer] online. You need 6629-7176 mg of calcium and 7914-3425 IU of vitamin D per day. It is possible to meet your calcium requirement with diet alone, but a vitamin D supplement is usually necessary to meet this goal.  When exposed to the sun, use a sunscreen that protects against both UVA and UVB radiation with an SPF of 30 or greater. Reapply every 2 to 3 hours or after sweating, drying off with a towel, or swimming. Always wear a seat belt when traveling in a car. Always wear a helmet when riding a bicycle or motorcycle.

## 2023-04-21 NOTE — PROGRESS NOTES
fluticasone (FLONASE) 50 MCG/ACT nasal spray 2 sprays by Each Nostril route daily 16 g 4    turmeric 500 MG CAPS Take by mouth daily      Misc Natural Products (GLUCOSAMINE CHOND COMPLEX/MSM) TABS Take 1 tablet by mouth daily      acetaminophen (TYLENOL) 325 MG tablet Take 2 tablets by mouth every 6 hours as needed for Pain      vitamin D-3 (CHOLECALCIFEROL) 5000 UNITS TABS Take 1 tablet by mouth daily Last dose 2-6-20      Magnesium 500 MG CAPS Take by mouth every evening Last dose 2-6-20       No current facility-administered medications for this visit. Allergies: Azmacort [triamcinolone], Doxycycline, Mepron [atovaquone], and Zithromax [azithromycin]     Review of Systems   Constitutional:  Negative for chills, fatigue, fever and unexpected weight change. HENT:  Positive for tinnitus. Respiratory:  Negative for cough, shortness of breath and wheezing. Cardiovascular:  Negative for chest pain, palpitations and leg swelling. Gastrointestinal:  Negative for abdominal distention and abdominal pain. Genitourinary:  Negative for dysuria. Musculoskeletal:  Negative for arthralgias. Skin:  Positive for rash. Neurological:  Negative for weakness, light-headedness, numbness and headaches. All other systems reviewed and are negative.        Objective   BP (!) 146/66   Pulse 76   Temp 96.8 °F (36 °C) (Temporal)   Resp 20   Ht 5' (1.524 m)   Wt 120 lb (54.4 kg)   SpO2 99%   BMI 23.44 kg/m²       Lab Results   Component Value Date    LABA1C 5.7 (H) 10/19/2022    LABA1C 5.3 10/18/2021     Lab Results   Component Value Date    CHOL 256 (H) 10/19/2022    CHOL 247 (H) 10/18/2021     Lab Results   Component Value Date    TRIG 102 10/19/2022    TRIG 101 10/18/2021     Lab Results   Component Value Date    HDL 81 10/19/2022    HDL 76 10/18/2021     Lab Results   Component Value Date    LDLCALC 155 (H) 10/19/2022    LDLCALC 151 (H) 10/18/2021     Lab Results   Component Value Date    LABVLDL 20
Provider, MD   vitamin D-3 (CHOLECALCIFEROL) 5000 UNITS TABS Take 1 tablet by mouth daily Last dose 2-6-20 Yes Historical Provider, MD   Magnesium 500 MG CAPS Take by mouth every evening Last dose 2-6-20 Yes Historical Provider, MD [place] (Including outside providers/suppliers regularly involved in providing care):   Patient Care Team:  Katelin Gregg MD as PCP - General (Family Medicine)  Katelin Gregg MD as PCP - Empaneled Provider     Reviewed and updated this visit:  Tobacco  Allergies  Meds  Problems  Med Hx  Surg Hx  Soc Hx  Fam Hx               Katelin Grgeg MD

## 2023-04-23 ENCOUNTER — PATIENT MESSAGE (OUTPATIENT)
Dept: FAMILY MEDICINE CLINIC | Age: 78
End: 2023-04-23

## 2023-04-24 NOTE — TELEPHONE ENCOUNTER
From: Mick Aponte  Sent: 4/23/2023 10:57 PM EDT  To: Maria Guadalupe Montemayor  Clinical Staff  Subject: Labs    Thank you very much for your care. I was reflecting on why I was having so much trouble drawing a cube in that test. I didn't have a problem at home doing it. I believe I got so anxious about doing it correctly, I just couldn't think. Thanks for your patience and encouragement. The hives went away with the first day of the steroid. I hope they don't return when it is completed.  Thanks again, [facility]

## 2023-05-16 ENCOUNTER — OFFICE VISIT (OUTPATIENT)
Dept: PAIN MANAGEMENT | Age: 78
End: 2023-05-16
Payer: MEDICARE

## 2023-05-16 VITALS
DIASTOLIC BLOOD PRESSURE: 67 MMHG | TEMPERATURE: 98.6 F | WEIGHT: 117 LBS | RESPIRATION RATE: 16 BRPM | HEART RATE: 79 BPM | HEIGHT: 59 IN | SYSTOLIC BLOOD PRESSURE: 162 MMHG | BODY MASS INDEX: 23.59 KG/M2

## 2023-05-16 DIAGNOSIS — Z85.42 HISTORY OF ENDOMETRIAL CANCER: ICD-10-CM

## 2023-05-16 DIAGNOSIS — M54.16 LUMBAR RADICULOPATHY: ICD-10-CM

## 2023-05-16 DIAGNOSIS — G89.4 CHRONIC PAIN SYNDROME: ICD-10-CM

## 2023-05-16 DIAGNOSIS — M46.1 SACROILIITIS (HCC): Primary | ICD-10-CM

## 2023-05-16 DIAGNOSIS — M48.062 SPINAL STENOSIS OF LUMBAR REGION WITH NEUROGENIC CLAUDICATION: ICD-10-CM

## 2023-05-16 PROCEDURE — G8427 DOCREV CUR MEDS BY ELIG CLIN: HCPCS | Performed by: STUDENT IN AN ORGANIZED HEALTH CARE EDUCATION/TRAINING PROGRAM

## 2023-05-16 PROCEDURE — 3078F DIAST BP <80 MM HG: CPT | Performed by: STUDENT IN AN ORGANIZED HEALTH CARE EDUCATION/TRAINING PROGRAM

## 2023-05-16 PROCEDURE — 1036F TOBACCO NON-USER: CPT | Performed by: STUDENT IN AN ORGANIZED HEALTH CARE EDUCATION/TRAINING PROGRAM

## 2023-05-16 PROCEDURE — 99213 OFFICE O/P EST LOW 20 MIN: CPT | Performed by: STUDENT IN AN ORGANIZED HEALTH CARE EDUCATION/TRAINING PROGRAM

## 2023-05-16 PROCEDURE — G8400 PT W/DXA NO RESULTS DOC: HCPCS | Performed by: STUDENT IN AN ORGANIZED HEALTH CARE EDUCATION/TRAINING PROGRAM

## 2023-05-16 PROCEDURE — 3077F SYST BP >= 140 MM HG: CPT | Performed by: STUDENT IN AN ORGANIZED HEALTH CARE EDUCATION/TRAINING PROGRAM

## 2023-05-16 PROCEDURE — 1123F ACP DISCUSS/DSCN MKR DOCD: CPT | Performed by: STUDENT IN AN ORGANIZED HEALTH CARE EDUCATION/TRAINING PROGRAM

## 2023-05-16 PROCEDURE — G8420 CALC BMI NORM PARAMETERS: HCPCS | Performed by: STUDENT IN AN ORGANIZED HEALTH CARE EDUCATION/TRAINING PROGRAM

## 2023-05-16 PROCEDURE — 1090F PRES/ABSN URINE INCON ASSESS: CPT | Performed by: STUDENT IN AN ORGANIZED HEALTH CARE EDUCATION/TRAINING PROGRAM

## 2023-05-16 RX ORDER — SODIUM CHLORIDE 9 MG/ML
INJECTION, SOLUTION INTRAVENOUS PRN
OUTPATIENT
Start: 2023-05-16

## 2023-05-16 RX ORDER — SODIUM CHLORIDE 0.9 % (FLUSH) 0.9 %
5-40 SYRINGE (ML) INJECTION PRN
OUTPATIENT
Start: 2023-05-16

## 2023-05-16 RX ORDER — SODIUM CHLORIDE 0.9 % (FLUSH) 0.9 %
5-40 SYRINGE (ML) INJECTION EVERY 12 HOURS SCHEDULED
OUTPATIENT
Start: 2023-05-16

## 2023-05-16 NOTE — PROGRESS NOTES
Rich Carvalho presents to the [facility] on 5/16/2023. Cass Benoit is complaining of pain lower back. The pain is intermittent. The pain is described as aching. Pain is rated on her best day at a 0, on her worst day at a 5, and on average at a 5 on the VAS scale. She took her last dose of Tylenol yesterday. Any procedures since your last visit: yes, with 100 % relief. Pacemaker or defibrillator: No  She is not on NSAIDS and is not on anticoagulation medication. Medication Contract and Consent for Opioid Use Documents Filed        No documents found                    BP (!) 162/67   Pulse 79   Temp 98.6 °F (37 °C) (Infrared)   Resp 16   Ht 4' 11\" (1.499 m)   Wt 117 lb (53.1 kg)   BMI 23.63 kg/m²      No LMP recorded. Patient has had a hysterectomy.
tenderness: No       Extremities:  Tremors: None bilaterally upper and lower   Range of motion:  grossly normal  Shoulders: Generally normal shoulders ,    Hips: no pain with internal rotation of hips   Varicose veins: absent    Pulses: present Lt radial   Cyanosis: none   Edema: none x all 4 extremities      Neurological: Sensory:normal to light touch   , CN 2-12 grossly intact \     MOTOR Left (x/5) Right (x/5)   Shoulder Abduction (C5)  5 5   Biceps - Elbow Flexion (C6)  5 5   Triceps - Elbow Extension (C7)  5 5   Hand  (C8)  5 5   Iliopsoas - Hip flex /Abd (L2)  5 5   Quadriceps - Knee Ext (L3)  5 5   Ant Tibialis - Ankle Dorsiflex (L4)  5 5   Post Tibialis - Hip Ext/Abd Foot dorsiflex (L5)  5 5   Gastrocnemius - Plantar flex (S1)  5 5      REFLEXES Left Right   Brachioradialis (C5/6)  2+ 2+   Biceps (C5/6)  2+ 2+   Triceps (C7)  2+ 2+   Quadriceps / Patellar (L4)  2+ 2+   Achilles (S1)  2+ 2+      Gait:normal     Dermatology: Skin:no unusual rashes      Impression:  Assessment/Plan:  Diagnoses and all orders for this visit:  Lumbar radiculopathy [M54.16 (ICD-10-CM)]  Sacroiliitis (HCC)  Chronic pain syndrome [G89.4 (ICD-10-CM)]  Spinal stenosis of lumbar region with neurogenic claudication  History of endometrial cancer  Other orders  -     Initiate PAT Protocol;  Future  -     Vital signs per unit routine; Standing  -     Diet NPO Exceptions are: Sips of Water with Meds; Standing  -     Verify discontinuation of anticoagulants/antiplatelets unless otherwise specified by physician; Standing  -     Void on call to OR; Standing  -     Verify pre-procedure history and physical completed; Standing  -     Procedure Consent; Standing  -     Anesthesia Type Request; Standing  -     Place intermittent pneumatic compression device; Standing  -     sodium chloride flush 0.9 % injection 5-40 mL  -     sodium chloride flush 0.9 % injection 5-40 mL  -     0.9 % sodium chloride infusion  -     Full Code; Standing      77

## 2023-05-31 ENCOUNTER — TELEPHONE (OUTPATIENT)
Dept: PAIN MANAGEMENT | Age: 78
End: 2023-05-31

## 2023-05-31 PROBLEM — M53.3 DISORDER OF SACRUM: Status: ACTIVE | Noted: 2023-05-31

## 2023-05-31 NOTE — TELEPHONE ENCOUNTER
Call to Kostas Carr that procedure was approved for 6/6/2023 and that Fanny should call her a few days before for the pre op call and between 2:00 PM and 4:00 PM  the business day before with the arrival time. Instructed Raj Hendrix to hold ibuprofen for 24 hours, naprosyn for 4 days and any aspirin containing products or fish oil for 7 days. Instructed to call office back if any questions. Raj Hendrix verbalized understanding.     Electronically signed by Dangelo Gilmore RN on 5/31/2023 at 11:24 AM

## 2023-06-02 RX ORDER — LORATADINE 10 MG/1
10 CAPSULE, LIQUID FILLED ORAL DAILY
COMMUNITY

## 2023-06-06 ENCOUNTER — HOSPITAL ENCOUNTER (OUTPATIENT)
Age: 78
Setting detail: OUTPATIENT SURGERY
Discharge: HOME | End: 2023-06-06
Attending: STUDENT IN AN ORGANIZED HEALTH CARE EDUCATION/TRAINING PROGRAM | Admitting: STUDENT IN AN ORGANIZED HEALTH CARE EDUCATION/TRAINING PROGRAM
Payer: MEDICARE

## 2023-06-06 ENCOUNTER — HOSPITAL ENCOUNTER (OUTPATIENT)
Dept: GENERAL RADIOLOGY | Age: 78
Discharge: HOME | End: 2023-06-08
Attending: STUDENT IN AN ORGANIZED HEALTH CARE EDUCATION/TRAINING PROGRAM
Payer: MEDICARE

## 2023-06-06 VITALS
HEART RATE: 56 BPM | DIASTOLIC BLOOD PRESSURE: 67 MMHG | SYSTOLIC BLOOD PRESSURE: 148 MMHG | OXYGEN SATURATION: 97 % | BODY MASS INDEX: 23.16 KG/M2 | WEIGHT: 118 LBS | HEIGHT: 60 IN | TEMPERATURE: 97.8 F | RESPIRATION RATE: 16 BRPM

## 2023-06-06 DIAGNOSIS — R52 PAIN MANAGEMENT: ICD-10-CM

## 2023-06-06 DIAGNOSIS — M53.3 DISORDER OF SACRUM: ICD-10-CM

## 2023-06-06 PROCEDURE — 7100000011 HC PHASE II RECOVERY - ADDTL 15 MIN: Performed by: STUDENT IN AN ORGANIZED HEALTH CARE EDUCATION/TRAINING PROGRAM

## 2023-06-06 PROCEDURE — 7100000010 HC PHASE II RECOVERY - FIRST 15 MIN: Performed by: STUDENT IN AN ORGANIZED HEALTH CARE EDUCATION/TRAINING PROGRAM

## 2023-06-06 PROCEDURE — 6370000000 HC RX 637 (ALT 250 FOR IP): Performed by: STUDENT IN AN ORGANIZED HEALTH CARE EDUCATION/TRAINING PROGRAM

## 2023-06-06 PROCEDURE — 3600000012 HC SURGERY LEVEL 2 ADDTL 15MIN: Performed by: STUDENT IN AN ORGANIZED HEALTH CARE EDUCATION/TRAINING PROGRAM

## 2023-06-06 PROCEDURE — 6360000002 HC RX W HCPCS: Performed by: STUDENT IN AN ORGANIZED HEALTH CARE EDUCATION/TRAINING PROGRAM

## 2023-06-06 PROCEDURE — 27096 INJECT SACROILIAC JOINT: CPT | Performed by: STUDENT IN AN ORGANIZED HEALTH CARE EDUCATION/TRAINING PROGRAM

## 2023-06-06 PROCEDURE — 6360000004 HC RX CONTRAST MEDICATION: Performed by: STUDENT IN AN ORGANIZED HEALTH CARE EDUCATION/TRAINING PROGRAM

## 2023-06-06 PROCEDURE — 2709999900 HC NON-CHARGEABLE SUPPLY: Performed by: STUDENT IN AN ORGANIZED HEALTH CARE EDUCATION/TRAINING PROGRAM

## 2023-06-06 PROCEDURE — 2500000003 HC RX 250 WO HCPCS: Performed by: STUDENT IN AN ORGANIZED HEALTH CARE EDUCATION/TRAINING PROGRAM

## 2023-06-06 PROCEDURE — 3209999900 FLUORO FOR SURGICAL PROCEDURES

## 2023-06-06 PROCEDURE — 3600000002 HC SURGERY LEVEL 2 BASE: Performed by: STUDENT IN AN ORGANIZED HEALTH CARE EDUCATION/TRAINING PROGRAM

## 2023-06-06 RX ORDER — LIDOCAINE HYDROCHLORIDE 5 MG/ML
INJECTION, SOLUTION INFILTRATION; INTRAVENOUS PRN
Status: DISCONTINUED | OUTPATIENT
Start: 2023-06-06 | End: 2023-06-06 | Stop reason: ALTCHOICE

## 2023-06-06 RX ORDER — BUPIVACAINE HYDROCHLORIDE 2.5 MG/ML
INJECTION, SOLUTION EPIDURAL; INFILTRATION; INTRACAUDAL PRN
Status: DISCONTINUED | OUTPATIENT
Start: 2023-06-06 | End: 2023-06-06 | Stop reason: ALTCHOICE

## 2023-06-06 RX ORDER — DIAZEPAM 5 MG/1
5 TABLET ORAL ONCE
Status: COMPLETED | OUTPATIENT
Start: 2023-06-06 | End: 2023-06-06

## 2023-06-06 RX ORDER — SODIUM CHLORIDE 0.9 % (FLUSH) 0.9 %
5-40 SYRINGE (ML) INJECTION EVERY 12 HOURS SCHEDULED
Status: DISCONTINUED | OUTPATIENT
Start: 2023-06-06 | End: 2023-06-06 | Stop reason: HOSPADM

## 2023-06-06 RX ORDER — SODIUM CHLORIDE 9 MG/ML
INJECTION, SOLUTION INTRAVENOUS PRN
Status: DISCONTINUED | OUTPATIENT
Start: 2023-06-06 | End: 2023-06-06 | Stop reason: HOSPADM

## 2023-06-06 RX ORDER — SODIUM CHLORIDE 0.9 % (FLUSH) 0.9 %
5-40 SYRINGE (ML) INJECTION PRN
Status: DISCONTINUED | OUTPATIENT
Start: 2023-06-06 | End: 2023-06-06 | Stop reason: HOSPADM

## 2023-06-06 RX ORDER — METHYLPREDNISOLONE ACETATE 40 MG/ML
INJECTION, SUSPENSION INTRA-ARTICULAR; INTRALESIONAL; INTRAMUSCULAR; SOFT TISSUE PRN
Status: DISCONTINUED | OUTPATIENT
Start: 2023-06-06 | End: 2023-06-06 | Stop reason: ALTCHOICE

## 2023-06-06 RX ADMIN — DIAZEPAM 5 MG: 5 TABLET ORAL at 11:12

## 2023-06-06 ASSESSMENT — PAIN - FUNCTIONAL ASSESSMENT: PAIN_FUNCTIONAL_ASSESSMENT: 0-10

## 2023-06-06 ASSESSMENT — PAIN SCALES - GENERAL: PAINLEVEL_OUTOF10: 4

## 2023-06-06 ASSESSMENT — PAIN DESCRIPTION - DESCRIPTORS
DESCRIPTORS: DISCOMFORT
DESCRIPTORS: DISCOMFORT

## 2023-06-06 ASSESSMENT — PAIN DESCRIPTION - ORIENTATION: ORIENTATION: LOWER

## 2023-06-06 NOTE — H&P
diaphoresis. NEURO: Cranial nerves II-XII grossly intact. No signs of agitated mood.        Assessment/Plan:    Left LBP, Left SIJ pain for Left SIJ injection

## 2023-06-06 NOTE — OP NOTE
2023    Patient: Sebastian Allen  :  [birth year]  Age:  68 y.o. Sex:  female     PRE-OPERATIVE DIAGNOSIS: Left   Sacroiliitis, somatic dysfunction of the lumbosacral spine. POST-OPERATIVE DIAGNOSIS: Same. PROCEDURE:  Fluoroscopic guided Left   sacroiliac joint injection with steroid     SURGEON:  Adrianna Harmon MD    ANESTHESIA: Local    ESTIMATED BLOOD LOSS: None.  ______________________________________________________________________  BRIEF HISTORY:  Sebastian Allen comes in today for   Left sacroiliac joint injection under fluoroscopic guidance. The potential complications as well as the procedure in detail were explained to her today. She has elected to undergo the aforementioned procedure. Roopa's complete History & Physical examination were reviewed in depth, a copy of which is in the chart. DESCRIPTION OF PROCEDURE:    After confirming written and informed consent, a time-out was performed and Karla name and date of birth, the procedure to be performed as well as the plan for the location of the needle insertion were confirmed. The patient was brought into the procedure room and placed in the prone position on the fluoroscopy table. Standard monitors were placed and vital signs were observed throughout the procedure. The low back and upper buttocks area was prepped with chloraprep and draped in a sterile manner. AP fluoroscopy was used to visualize the sacroiliac joint. The fluoroscopic beam was then obliqued until the anterior and posterior margins of the joint were aligned. The inferior margin of the joint was identified and marked. The skin and subcutaneous tissue about this identified point were anesthestized with 0.5% lidocaine. A 22 gauge 3 1/2 spinal needle was advanced toward the the identified point under fluoroscopic guidance.  Once the targeted point was reached and the joint space was entered, negative aspiration was confirmed, and 0.5 cc ml of Isovue - M 30  was

## 2023-06-06 NOTE — DISCHARGE INSTRUCTIONS
Steven Juarez Block/Radiofrequency  Home Going Instructions    1-Go home, rest for the remainder of the day  2-Please do not lift over 20 pounds the day of the injection  3-If you received sedation No: alcohol, driving, operating lawn mowers, plows, tractors or other dangerous equipment until next morning. Do not make important decisions or sign legal documents for 24 hours. You may experience light headedness, dizziness, nausea or sleepiness after sedation. Do not stay alone. A responsible adult must be with you for 24 hours. You could be nauseated from the medications you have received. Your IV site may be sore and bruised. 4-No dietary restrictions     5-Resume all medications the same day, blood thinners to be resumed 24 hours after injection if you were instructed to stop any. 6-Keep the surgical site clean and dry, you may shower the next morning and remove the      dressing. 7- No sitz baths, tub baths or hot tubs/swimming for 24 hours. 8- If you have any pain at the injection site(s), application of an ice pack to the area should be       helpful, 20 minutes on/20 minutes off for next 48 hours. 9- Call [facility] immediately at if you develop.   Fever greater than 100.4 F  Have bleeding or drainage from the puncture site  Have progressive Leg/arm numbness and or weakness  Loss of control of bowel and or bladder (wet/soil yourself)  Severe headache with inability to lift head  10-You may return to work the next day

## 2023-06-27 ENCOUNTER — OFFICE VISIT (OUTPATIENT)
Dept: PAIN MANAGEMENT | Age: 78
End: 2023-06-27
Payer: MEDICARE

## 2023-06-27 VITALS
DIASTOLIC BLOOD PRESSURE: 78 MMHG | HEART RATE: 68 BPM | TEMPERATURE: 97.3 F | OXYGEN SATURATION: 97 % | SYSTOLIC BLOOD PRESSURE: 148 MMHG

## 2023-06-27 DIAGNOSIS — G89.4 CHRONIC PAIN SYNDROME: ICD-10-CM

## 2023-06-27 DIAGNOSIS — Z85.42 HISTORY OF ENDOMETRIAL CANCER: ICD-10-CM

## 2023-06-27 DIAGNOSIS — M54.16 LUMBAR RADICULOPATHY: ICD-10-CM

## 2023-06-27 DIAGNOSIS — M46.1 SACROILIITIS (HCC): Primary | ICD-10-CM

## 2023-06-27 DIAGNOSIS — M48.062 SPINAL STENOSIS OF LUMBAR REGION WITH NEUROGENIC CLAUDICATION: ICD-10-CM

## 2023-06-27 PROCEDURE — 3078F DIAST BP <80 MM HG: CPT | Performed by: STUDENT IN AN ORGANIZED HEALTH CARE EDUCATION/TRAINING PROGRAM

## 2023-06-27 PROCEDURE — 3077F SYST BP >= 140 MM HG: CPT | Performed by: STUDENT IN AN ORGANIZED HEALTH CARE EDUCATION/TRAINING PROGRAM

## 2023-06-27 PROCEDURE — G8427 DOCREV CUR MEDS BY ELIG CLIN: HCPCS | Performed by: STUDENT IN AN ORGANIZED HEALTH CARE EDUCATION/TRAINING PROGRAM

## 2023-06-27 PROCEDURE — 1123F ACP DISCUSS/DSCN MKR DOCD: CPT | Performed by: STUDENT IN AN ORGANIZED HEALTH CARE EDUCATION/TRAINING PROGRAM

## 2023-06-27 PROCEDURE — G8400 PT W/DXA NO RESULTS DOC: HCPCS | Performed by: STUDENT IN AN ORGANIZED HEALTH CARE EDUCATION/TRAINING PROGRAM

## 2023-06-27 PROCEDURE — 99213 OFFICE O/P EST LOW 20 MIN: CPT | Performed by: STUDENT IN AN ORGANIZED HEALTH CARE EDUCATION/TRAINING PROGRAM

## 2023-06-27 PROCEDURE — 1090F PRES/ABSN URINE INCON ASSESS: CPT | Performed by: STUDENT IN AN ORGANIZED HEALTH CARE EDUCATION/TRAINING PROGRAM

## 2023-06-27 PROCEDURE — 99213 OFFICE O/P EST LOW 20 MIN: CPT

## 2023-06-27 PROCEDURE — 1036F TOBACCO NON-USER: CPT | Performed by: STUDENT IN AN ORGANIZED HEALTH CARE EDUCATION/TRAINING PROGRAM

## 2023-06-27 PROCEDURE — G8420 CALC BMI NORM PARAMETERS: HCPCS | Performed by: STUDENT IN AN ORGANIZED HEALTH CARE EDUCATION/TRAINING PROGRAM

## 2023-07-12 ENCOUNTER — TELEPHONE (OUTPATIENT)
Dept: PHARMACY | Facility: CLINIC | Age: 78
End: 2023-07-12

## 2023-07-12 NOTE — TELEPHONE ENCOUNTER
[facility]– [place] [facility]: ADHERENCE REVIEW  Identified care gap per [facility] ([place]). Per insurer report, LIS-0 - co-pays are based on tiers and patient is subject to coverage gap.    fills with [address]: ACE/ARB adherence    Patient also appears to be prescribed:No Others in the metric at this time      ASSESSMENT    ACE/ARB ADHERENCE    Insurance Records claims through 07/08/2023 (Prior Year [address] = not reported; YTD [address] = FIRST FILL; Potential Fail Date: 07.19.23):   Lisinopril last filled on 02.16.23 for 90 day supply. Next refill due: 05.17.23    Per Insurer Portal: last filled on (same as above). .     BP Readings from Last 3 Encounters:   06/27/23 (!) 148/78   06/06/23 (!) 148/67   05/16/23 (!) 162/67     Estimated Creatinine Clearance: 48 mL/min (based on SCr of 0.7 mg/dL). Lab Results   Component Value Date    CREATININE 0.7 04/20/2023     Lab Results   Component Value Date    K 4.6 04/20/2023          PLAN  The following are interventions that have been identified:   Patient overdue refilling lisinopril. Unsure if patient is still prescribed this medication. This medication was discontinued 3/9/23. Outreach:  No patient out reach planned at this time.        Recent Visits  Date Type Provider Dept   04/20/23 Office Visit MD Maria Guadalupe Martinez   03/09/23 Office Visit MD Maria Guadalupe Martinez   02/16/23 Office Visit MD Maria Guadalupe Martinez   [facility]   02/02/23 Office Visit LINO Dugan CNP [facility]   01/16/23 Office Visit MD Maria Guadalupe Martinez   10/19/22 Office Visit MD Maria Guadalupe Martinez   04/18/22 Office Visit MD Maria Guadalupe Martinez    Showing recent visits within past 540 days with a meds authorizing provider and meeting all other requirements  Future Appointments  Date Type Provider Dept   10/25/23 Appointment Lee Montiel MD yx

## 2023-08-28 PROBLEM — M79.602 CHRONIC PAIN OF LEFT UPPER EXTREMITY: Status: ACTIVE | Noted: 2023-02-16

## 2023-08-28 PROBLEM — I10 HYPERTENSION: Status: ACTIVE | Noted: 2022-04-18

## 2023-08-28 PROBLEM — M53.3 DISORDER OF SACRUM: Status: ACTIVE | Noted: 2023-01-16

## 2023-08-29 ENCOUNTER — OFFICE VISIT (OUTPATIENT)
Dept: PAIN MANAGEMENT | Age: 78
End: 2023-08-29
Payer: MEDICARE

## 2023-08-29 VITALS
TEMPERATURE: 97.2 F | RESPIRATION RATE: 16 BRPM | WEIGHT: 120 LBS | OXYGEN SATURATION: 97 % | HEART RATE: 84 BPM | BODY MASS INDEX: 23.56 KG/M2 | HEIGHT: 60 IN

## 2023-08-29 DIAGNOSIS — G89.4 CHRONIC PAIN SYNDROME: ICD-10-CM

## 2023-08-29 DIAGNOSIS — M54.16 LUMBAR RADICULOPATHY: ICD-10-CM

## 2023-08-29 DIAGNOSIS — M46.1 SACROILIITIS (HCC): Primary | ICD-10-CM

## 2023-08-29 DIAGNOSIS — Z85.42 HISTORY OF ENDOMETRIAL CANCER: ICD-10-CM

## 2023-08-29 DIAGNOSIS — M48.062 SPINAL STENOSIS OF LUMBAR REGION WITH NEUROGENIC CLAUDICATION: ICD-10-CM

## 2023-08-29 PROCEDURE — 1090F PRES/ABSN URINE INCON ASSESS: CPT | Performed by: STUDENT IN AN ORGANIZED HEALTH CARE EDUCATION/TRAINING PROGRAM

## 2023-08-29 PROCEDURE — 99213 OFFICE O/P EST LOW 20 MIN: CPT | Performed by: STUDENT IN AN ORGANIZED HEALTH CARE EDUCATION/TRAINING PROGRAM

## 2023-08-29 PROCEDURE — 1036F TOBACCO NON-USER: CPT | Performed by: STUDENT IN AN ORGANIZED HEALTH CARE EDUCATION/TRAINING PROGRAM

## 2023-08-29 PROCEDURE — G8400 PT W/DXA NO RESULTS DOC: HCPCS | Performed by: STUDENT IN AN ORGANIZED HEALTH CARE EDUCATION/TRAINING PROGRAM

## 2023-08-29 PROCEDURE — 1123F ACP DISCUSS/DSCN MKR DOCD: CPT | Performed by: STUDENT IN AN ORGANIZED HEALTH CARE EDUCATION/TRAINING PROGRAM

## 2023-08-29 PROCEDURE — G8427 DOCREV CUR MEDS BY ELIG CLIN: HCPCS | Performed by: STUDENT IN AN ORGANIZED HEALTH CARE EDUCATION/TRAINING PROGRAM

## 2023-08-29 PROCEDURE — G8420 CALC BMI NORM PARAMETERS: HCPCS | Performed by: STUDENT IN AN ORGANIZED HEALTH CARE EDUCATION/TRAINING PROGRAM

## 2023-09-12 LAB — MAMMOGRAPHY, EXTERNAL: NORMAL

## 2023-10-25 ENCOUNTER — OFFICE VISIT (OUTPATIENT)
Dept: FAMILY MEDICINE CLINIC | Age: 78
End: 2023-10-25
Payer: MEDICARE

## 2023-10-25 VITALS
DIASTOLIC BLOOD PRESSURE: 79 MMHG | BODY MASS INDEX: 23.75 KG/M2 | RESPIRATION RATE: 20 BRPM | HEIGHT: 60 IN | HEART RATE: 67 BPM | OXYGEN SATURATION: 98 % | WEIGHT: 121 LBS | SYSTOLIC BLOOD PRESSURE: 152 MMHG | TEMPERATURE: 96.9 F

## 2023-10-25 DIAGNOSIS — I25.10 ARTERIOSCLEROSIS OF CORONARY ARTERY: ICD-10-CM

## 2023-10-25 DIAGNOSIS — I65.21 STENOSIS OF RIGHT CAROTID ARTERY: ICD-10-CM

## 2023-10-25 DIAGNOSIS — E55.9 VITAMIN D DEFICIENCY: ICD-10-CM

## 2023-10-25 DIAGNOSIS — R42 DISEQUILIBRIUM: ICD-10-CM

## 2023-10-25 DIAGNOSIS — Z85.42 HISTORY OF ENDOMETRIAL CANCER: ICD-10-CM

## 2023-10-25 DIAGNOSIS — R73.09 ELEVATED HEMOGLOBIN A1C: ICD-10-CM

## 2023-10-25 DIAGNOSIS — R41.3 MEMORY CHANGE: ICD-10-CM

## 2023-10-25 DIAGNOSIS — R03.0 WHITE COAT SYNDROME WITHOUT DIAGNOSIS OF HYPERTENSION: Primary | ICD-10-CM

## 2023-10-25 LAB — HBA1C MFR BLD: 5.6 %

## 2023-10-25 PROCEDURE — 1090F PRES/ABSN URINE INCON ASSESS: CPT | Performed by: STUDENT IN AN ORGANIZED HEALTH CARE EDUCATION/TRAINING PROGRAM

## 2023-10-25 PROCEDURE — G8427 DOCREV CUR MEDS BY ELIG CLIN: HCPCS | Performed by: STUDENT IN AN ORGANIZED HEALTH CARE EDUCATION/TRAINING PROGRAM

## 2023-10-25 PROCEDURE — 99214 OFFICE O/P EST MOD 30 MIN: CPT | Performed by: STUDENT IN AN ORGANIZED HEALTH CARE EDUCATION/TRAINING PROGRAM

## 2023-10-25 PROCEDURE — G8484 FLU IMMUNIZE NO ADMIN: HCPCS | Performed by: STUDENT IN AN ORGANIZED HEALTH CARE EDUCATION/TRAINING PROGRAM

## 2023-10-25 PROCEDURE — 3078F DIAST BP <80 MM HG: CPT | Performed by: STUDENT IN AN ORGANIZED HEALTH CARE EDUCATION/TRAINING PROGRAM

## 2023-10-25 PROCEDURE — G8400 PT W/DXA NO RESULTS DOC: HCPCS | Performed by: STUDENT IN AN ORGANIZED HEALTH CARE EDUCATION/TRAINING PROGRAM

## 2023-10-25 PROCEDURE — G8420 CALC BMI NORM PARAMETERS: HCPCS | Performed by: STUDENT IN AN ORGANIZED HEALTH CARE EDUCATION/TRAINING PROGRAM

## 2023-10-25 PROCEDURE — 3077F SYST BP >= 140 MM HG: CPT | Performed by: STUDENT IN AN ORGANIZED HEALTH CARE EDUCATION/TRAINING PROGRAM

## 2023-10-25 PROCEDURE — 83036 HEMOGLOBIN GLYCOSYLATED A1C: CPT | Performed by: STUDENT IN AN ORGANIZED HEALTH CARE EDUCATION/TRAINING PROGRAM

## 2023-10-25 PROCEDURE — 1036F TOBACCO NON-USER: CPT | Performed by: STUDENT IN AN ORGANIZED HEALTH CARE EDUCATION/TRAINING PROGRAM

## 2023-10-25 PROCEDURE — 1123F ACP DISCUSS/DSCN MKR DOCD: CPT | Performed by: STUDENT IN AN ORGANIZED HEALTH CARE EDUCATION/TRAINING PROGRAM

## 2023-10-25 RX ORDER — ASPIRIN 81 MG/1
81 TABLET ORAL DAILY
Qty: 90 TABLET | Refills: 3
Start: 2023-10-25

## 2023-10-25 ASSESSMENT — ENCOUNTER SYMPTOMS
BLOOD IN STOOL: 0
SHORTNESS OF BREATH: 0
COUGH: 0
CONSTIPATION: 0
ABDOMINAL PAIN: 0
DIARRHEA: 0
WHEEZING: 0
ABDOMINAL DISTENTION: 0
NAUSEA: 0

## 2023-10-25 NOTE — PROGRESS NOTES
alert.   Psychiatric:         Mood and Affect: Mood normal.         Behavior: Behavior normal.             An electronic signature was used to authenticate this note. --Valeria Mccormack MD       *NOTE: This report was transcribed using voice recognition software. Every effort was made to ensure accuracy; however, inadvertent computerized transcription errors may be present.

## 2023-11-13 ENCOUNTER — HOSPITAL ENCOUNTER (OUTPATIENT)
Dept: ULTRASOUND IMAGING | Age: 78
Discharge: HOME | End: 2023-11-15
Payer: MEDICARE

## 2023-11-13 DIAGNOSIS — I65.21 STENOSIS OF RIGHT CAROTID ARTERY: ICD-10-CM

## 2023-11-13 PROCEDURE — 93880 EXTRACRANIAL BILAT STUDY: CPT

## 2023-12-11 ENCOUNTER — OFFICE VISIT (OUTPATIENT)
Dept: PRIMARY CARE CLINIC | Age: 78
End: 2023-12-11
Payer: MEDICARE

## 2023-12-11 VITALS
TEMPERATURE: 97.7 F | WEIGHT: 124 LBS | RESPIRATION RATE: 19 BRPM | BODY MASS INDEX: 24.35 KG/M2 | HEART RATE: 75 BPM | HEIGHT: 60 IN | SYSTOLIC BLOOD PRESSURE: 155 MMHG | DIASTOLIC BLOOD PRESSURE: 77 MMHG | OXYGEN SATURATION: 99 %

## 2023-12-11 DIAGNOSIS — J22 ACUTE LOWER RESPIRATORY INFECTION: ICD-10-CM

## 2023-12-11 DIAGNOSIS — R05.8 PRODUCTIVE COUGH: ICD-10-CM

## 2023-12-11 PROCEDURE — 1036F TOBACCO NON-USER: CPT | Performed by: NURSE PRACTITIONER

## 2023-12-11 PROCEDURE — G8427 DOCREV CUR MEDS BY ELIG CLIN: HCPCS | Performed by: NURSE PRACTITIONER

## 2023-12-11 PROCEDURE — 1123F ACP DISCUSS/DSCN MKR DOCD: CPT | Performed by: NURSE PRACTITIONER

## 2023-12-11 PROCEDURE — 3077F SYST BP >= 140 MM HG: CPT | Performed by: NURSE PRACTITIONER

## 2023-12-11 PROCEDURE — G8420 CALC BMI NORM PARAMETERS: HCPCS | Performed by: NURSE PRACTITIONER

## 2023-12-11 PROCEDURE — 99213 OFFICE O/P EST LOW 20 MIN: CPT | Performed by: NURSE PRACTITIONER

## 2023-12-11 PROCEDURE — G8400 PT W/DXA NO RESULTS DOC: HCPCS | Performed by: NURSE PRACTITIONER

## 2023-12-11 PROCEDURE — 3078F DIAST BP <80 MM HG: CPT | Performed by: NURSE PRACTITIONER

## 2023-12-11 PROCEDURE — 1090F PRES/ABSN URINE INCON ASSESS: CPT | Performed by: NURSE PRACTITIONER

## 2023-12-11 PROCEDURE — G8484 FLU IMMUNIZE NO ADMIN: HCPCS | Performed by: NURSE PRACTITIONER

## 2023-12-11 RX ORDER — AZITHROMYCIN 250 MG/1
250 TABLET, FILM COATED ORAL SEE ADMIN INSTRUCTIONS
Qty: 6 TABLET | Refills: 0 | Status: SHIPPED | OUTPATIENT
Start: 2023-12-11 | End: 2023-12-16

## 2023-12-11 RX ORDER — DEXTROMETHORPHAN HYDROBROMIDE AND PROMETHAZINE HYDROCHLORIDE 15; 6.25 MG/5ML; MG/5ML
5 SYRUP ORAL 4 TIMES DAILY PRN
Qty: 180 ML | Refills: 0 | Status: SHIPPED | OUTPATIENT
Start: 2023-12-11 | End: 2023-12-18

## 2023-12-11 NOTE — PROGRESS NOTES
Chief Complaint:   Cough (Last 24 hours patient recently had covid )      History of Present Illness   Source of history provided by:  patient. Key Elliott is a 66 y.o. old female with a past medical history of:   Past Medical History:   Diagnosis Date    Arthritis     Babesiosis 2009    tick disease; treated    Back pain     Endometrial cancer ([address]) 11/21 2019 approx    new dx    History of blood transfusion     Hyperlipidemia     not on any medications    GENNY on CPAP         Pt presents to the [facility] with a cough/chest congestion/nasal drainage for the past 1-2 days. States the cough is  productive. No  fever noted. Denies any N/V/D, abdominal pain, CP, progressive SOB, dizziness, or lethargy. ROS    Unless otherwise stated in this report or unable to obtain because of the patient's clinical or mental status as evidenced by the medical record, this patients's positive and negative responses for Review of Systems, constitutional, psych, eyes, ENT, cardiovascular, respiratory, gastrointestinal, neurological, genitourinary, musculoskeletal, integument systems and systems related to the presenting problem are either stated in the preceding or were not pertinent or were negative for the symptoms and/or complaints related to the medical problem. Past Surgical History:  has a past surgical history that includes Tubal ligation (1980); Tonsillectomy (1947); Bunionectomy (2006 2007); cyst removal (2010); Colonoscopy; other surgical history (Right, 07/03/2015); Finger trigger release (Left, 12/18/2018); Colonoscopy (N/A, 12/04/2019); Carpal tunnel release (Bilateral); Hysterectomy (12/17/2019); [address] Surgery (N/A, 02/11/2020); Injection (Right, 06/2021); Port Surgery; Pain management procedure (N/A, 3/7/2023); and Nerve Block (Left, 6/6/2023). Social History:  reports that she has never smoked. She has never used smokeless tobacco. She reports current alcohol use.  She reports that she does

## 2023-12-13 ENCOUNTER — TELEPHONE (OUTPATIENT)
Dept: FAMILY MEDICINE CLINIC | Age: 78
End: 2023-12-13

## 2023-12-13 NOTE — TELEPHONE ENCOUNTER
Received fax from pharmacy. Patient has allergies to narcrolides there for pharmacy needs clarification on medication. Please advise    Azithromycin 250 mg pack tablet.

## 2024-01-24 ENCOUNTER — OFFICE VISIT (OUTPATIENT)
Dept: FAMILY MEDICINE CLINIC | Age: 79
End: 2024-01-24

## 2024-01-24 VITALS
OXYGEN SATURATION: 96 % | RESPIRATION RATE: 19 BRPM | HEART RATE: 71 BPM | SYSTOLIC BLOOD PRESSURE: 142 MMHG | TEMPERATURE: 97.9 F | WEIGHT: 122.8 LBS | HEIGHT: 60 IN | BODY MASS INDEX: 24.11 KG/M2 | DIASTOLIC BLOOD PRESSURE: 70 MMHG

## 2024-01-24 DIAGNOSIS — M85.80 OSTEOPENIA, UNSPECIFIED LOCATION: ICD-10-CM

## 2024-01-24 DIAGNOSIS — I25.10 ARTERIOSCLEROSIS OF CORONARY ARTERY: ICD-10-CM

## 2024-01-24 DIAGNOSIS — I10 WHITE COAT SYNDROME WITH DIAGNOSIS OF HYPERTENSION: ICD-10-CM

## 2024-01-24 DIAGNOSIS — I65.21 STENOSIS OF RIGHT CAROTID ARTERY: ICD-10-CM

## 2024-01-24 DIAGNOSIS — E78.1 PURE HYPERTRIGLYCERIDEMIA: ICD-10-CM

## 2024-01-24 DIAGNOSIS — Z00.00 MEDICARE ANNUAL WELLNESS VISIT, SUBSEQUENT: Primary | ICD-10-CM

## 2024-01-24 DIAGNOSIS — I10 PRIMARY HYPERTENSION: ICD-10-CM

## 2024-01-24 DIAGNOSIS — M46.1 SACROILIITIS (HCC): ICD-10-CM

## 2024-01-24 DIAGNOSIS — Z78.0 POSTMENOPAUSAL: ICD-10-CM

## 2024-01-24 DIAGNOSIS — Z71.89 ACP (ADVANCE CARE PLANNING): ICD-10-CM

## 2024-01-24 RX ORDER — DIMENHYDRINATE 50 MG
1000 TABLET ORAL DAILY
COMMUNITY

## 2024-01-24 RX ORDER — ASPIRIN 325 MG
325 TABLET, DELAYED RELEASE (ENTERIC COATED) ORAL DAILY
Qty: 90 TABLET | Refills: 3
Start: 2024-01-24

## 2024-01-24 RX ORDER — AMOXICILLIN 500 MG/1
500 CAPSULE ORAL 3 TIMES DAILY
COMMUNITY
End: 2024-01-24

## 2024-01-24 ASSESSMENT — LIFESTYLE VARIABLES
HOW MANY STANDARD DRINKS CONTAINING ALCOHOL DO YOU HAVE ON A TYPICAL DAY: PATIENT DOES NOT DRINK
HOW OFTEN DO YOU HAVE A DRINK CONTAINING ALCOHOL: NEVER
HOW OFTEN DO YOU HAVE A DRINK CONTAINING ALCOHOL: MONTHLY OR LESS
HOW MANY STANDARD DRINKS CONTAINING ALCOHOL DO YOU HAVE ON A TYPICAL DAY: 1 OR 2

## 2024-01-24 ASSESSMENT — PATIENT HEALTH QUESTIONNAIRE - PHQ9
SUM OF ALL RESPONSES TO PHQ QUESTIONS 1-9: 0
1. LITTLE INTEREST OR PLEASURE IN DOING THINGS: 0
SUM OF ALL RESPONSES TO PHQ QUESTIONS 1-9: 0
2. FEELING DOWN, DEPRESSED OR HOPELESS: 0
SUM OF ALL RESPONSES TO PHQ QUESTIONS 1-9: 0
SUM OF ALL RESPONSES TO PHQ9 QUESTIONS 1 & 2: 0
SUM OF ALL RESPONSES TO PHQ QUESTIONS 1-9: 0

## 2024-01-24 NOTE — PROGRESS NOTES
notice any trouble with your hearing that hasn't been managed with hearing aids?: (!) Yes    Interventions:  Patient declines any further evaluation or treatment         The 10-year ASCVD risk score (Mary ALEJO, et al., 2019) is: 27.1%    Values used to calculate the score:      Age: 78 years      Sex: Female      Is Non- : No      Diabetic: No      Tobacco smoker: No      Systolic Blood Pressure: 142 mmHg      Is BP treated: No      HDL Cholesterol: 81 mg/dL      Total Cholesterol: 256 mg/dL  Patient declined statin medication, discussed with patient that ASCVD risk is high for heart attack and stroke in the next 10 years and that guideline directed medical therapy based on evidence recommends the high intensity statin.  Shared decision making used and evidence shared with patient as well as mechanism of action to prevent stroke and heart attack of the statin.  Patient endorsed understanding is willing to incur risk and will follow strict lifestyle modifications including cardio metabolic food plan, weight loss, 150 minutes of cardiovascular exercise a week, and stress reduction techniques             Objective   Vitals:    01/24/24 1040 01/24/24 1047   BP: (!) 161/73 (!) 142/70   Pulse: 71    Resp: 19    Temp: 97.9 °F (36.6 °C)    SpO2: 96%    Weight: 55.7 kg (122 lb 12.8 oz)    Height: 1.524 m (5')       Body mass index is 23.98 kg/m².        General Appearance: alert and appropriate, in no acute distress  Skin: warm and dry, no rash or erythema of exposed skin  Head: normocephalic and atraumatic  Eyes: extraocular eye movements intact, conjunctivae normal  ENT: tympanic membrane, external ear and ear canal normal bilaterally, nose without deformity, nasal mucosa and turbinates normal without polyps  Neck: supple and non-tender without mass, no cervical lymphadenopathy  Pulmonary/Chest: clear to auscultation bilaterally- no wheezes, rales or rhonchi, normal air movement, no respiratory

## 2024-01-29 LAB
CHOLESTEROL, TOTAL: NORMAL
CHOLESTEROL/HDL RATIO: NORMAL
GLUCOSE BLD-MCNC: NORMAL MG/DL
HDLC SERPL-MCNC: NORMAL MG/DL
LDL CHOLESTEROL CALCULATED: NORMAL
NONHDLC SERPL-MCNC: NORMAL MG/DL
TRIGL SERPL-MCNC: NORMAL MG/DL
TSH SERPL DL<=0.05 MIU/L-ACNC: NORMAL M[IU]/L
VITAMIN B-12: NORMAL
VITAMIN D 25-HYDROXY: NORMAL
VITAMIN D2, 25 HYDROXY: NORMAL
VITAMIN D3,25 HYDROXY: NORMAL
VLDLC SERPL CALC-MCNC: NORMAL MG/DL

## 2024-02-02 DIAGNOSIS — E78.1 PURE HYPERTRIGLYCERIDEMIA: ICD-10-CM

## 2024-04-08 ENCOUNTER — OFFICE VISIT (OUTPATIENT)
Dept: PRIMARY CARE CLINIC | Age: 79
End: 2024-04-08
Payer: MEDICARE

## 2024-04-08 VITALS — TEMPERATURE: 97.2 F | SYSTOLIC BLOOD PRESSURE: 154 MMHG | DIASTOLIC BLOOD PRESSURE: 79 MMHG | HEART RATE: 80 BPM

## 2024-04-08 DIAGNOSIS — J01.90 ACUTE BACTERIAL SINUSITIS: Primary | ICD-10-CM

## 2024-04-08 DIAGNOSIS — B96.89 ACUTE BACTERIAL SINUSITIS: Primary | ICD-10-CM

## 2024-04-08 PROCEDURE — 1090F PRES/ABSN URINE INCON ASSESS: CPT | Performed by: NURSE PRACTITIONER

## 2024-04-08 PROCEDURE — G8400 PT W/DXA NO RESULTS DOC: HCPCS | Performed by: NURSE PRACTITIONER

## 2024-04-08 PROCEDURE — 3078F DIAST BP <80 MM HG: CPT | Performed by: NURSE PRACTITIONER

## 2024-04-08 PROCEDURE — 1036F TOBACCO NON-USER: CPT | Performed by: NURSE PRACTITIONER

## 2024-04-08 PROCEDURE — 1123F ACP DISCUSS/DSCN MKR DOCD: CPT | Performed by: NURSE PRACTITIONER

## 2024-04-08 PROCEDURE — G8427 DOCREV CUR MEDS BY ELIG CLIN: HCPCS | Performed by: NURSE PRACTITIONER

## 2024-04-08 PROCEDURE — G8420 CALC BMI NORM PARAMETERS: HCPCS | Performed by: NURSE PRACTITIONER

## 2024-04-08 PROCEDURE — 99213 OFFICE O/P EST LOW 20 MIN: CPT | Performed by: NURSE PRACTITIONER

## 2024-04-08 PROCEDURE — 3077F SYST BP >= 140 MM HG: CPT | Performed by: NURSE PRACTITIONER

## 2024-04-08 RX ORDER — BROMPHENIRAMINE MALEATE, PSEUDOEPHEDRINE HYDROCHLORIDE, AND DEXTROMETHORPHAN HYDROBROMIDE 2; 30; 10 MG/5ML; MG/5ML; MG/5ML
5 SYRUP ORAL 4 TIMES DAILY PRN
Qty: 118 ML | Refills: 0 | Status: SHIPPED | OUTPATIENT
Start: 2024-04-08

## 2024-04-08 RX ORDER — PREDNISONE 10 MG/1
10 TABLET ORAL 2 TIMES DAILY
Qty: 10 TABLET | Refills: 0 | Status: SHIPPED | OUTPATIENT
Start: 2024-04-08 | End: 2024-04-13

## 2024-04-08 RX ORDER — CEFDINIR 300 MG/1
300 CAPSULE ORAL 2 TIMES DAILY
Qty: 20 CAPSULE | Refills: 0 | Status: SHIPPED | OUTPATIENT
Start: 2024-04-08 | End: 2024-04-18

## 2024-04-08 NOTE — PROGRESS NOTES
Chief Complaint:   Sinusitis (X1 week , yellow mucous drainage, no other symptoms)      History of Present Illness   Source of history provided by:  patient.      Roopa Couch is a 78 y.o. old female with a past medical history of:   Past Medical History:   Diagnosis Date    Arthritis     Babesiosis 2009    tick disease; treated    Back pain     Endometrial cancer (HCC) 11/21 2019 approx    new dx    History of blood transfusion     Hyperlipidemia     not on any medications    GENNY on CPAP        Pt  presents to the [facility] with a cough/congestion/sinus pressure for the past 5-7 days.  States the cough is non productive.    No fever noted.   Denies any N/V/D, abdominal pain, CP, progressive SOB, dizziness, or lethargy.           ROS    Unless otherwise stated in this report or unable to obtain because of the patient's clinical or mental status as evidenced by the medical record, this patients's positive and negative responses for Review of Systems, constitutional, psych, eyes, ENT, cardiovascular, respiratory, gastrointestinal, neurological, genitourinary, musculoskeletal, integument systems and systems related to the presenting problem are either stated in the preceding or were not pertinent or were negative for the symptoms and/or complaints related to the medical problem.    Past Surgical History:  has a past surgical history that includes Tubal ligation (1980); Tonsillectomy (1947); Bunionectomy (2006 2007); cyst removal (2010); Colonoscopy; other surgical history (Right, 07/03/2015); Finger trigger release (Left, 12/18/2018); Colonoscopy (N/A, 12/04/2019); Carpal tunnel release (Bilateral); Hysterectomy (12/17/2019); Port Surgery (N/A, 02/11/2020); Injection (Right, 06/2021); Port Surgery; Pain management procedure (N/A, 3/7/2023); and Nerve Block (Left, 6/6/2023).  Social History:  reports that she has never smoked. She has never used smokeless tobacco. She reports current alcohol use. She reports that

## 2024-04-18 ENCOUNTER — OFFICE VISIT (OUTPATIENT)
Dept: FAMILY MEDICINE CLINIC | Age: 79
End: 2024-04-18

## 2024-04-18 VITALS
HEIGHT: 60 IN | OXYGEN SATURATION: 99 % | TEMPERATURE: 97.1 F | SYSTOLIC BLOOD PRESSURE: 150 MMHG | BODY MASS INDEX: 24.15 KG/M2 | WEIGHT: 123 LBS | DIASTOLIC BLOOD PRESSURE: 70 MMHG | RESPIRATION RATE: 20 BRPM | HEART RATE: 77 BPM

## 2024-04-18 DIAGNOSIS — I10 PRIMARY HYPERTENSION: ICD-10-CM

## 2024-04-18 DIAGNOSIS — I10 WHITE COAT SYNDROME WITH DIAGNOSIS OF HYPERTENSION: ICD-10-CM

## 2024-04-18 DIAGNOSIS — I25.10 ARTERIOSCLEROSIS OF CORONARY ARTERY: ICD-10-CM

## 2024-04-18 DIAGNOSIS — R22.0 SWELLING OF RIGHT SIDE OF FACE: ICD-10-CM

## 2024-04-18 DIAGNOSIS — J34.89 NASAL SORE: Primary | ICD-10-CM

## 2024-04-18 DIAGNOSIS — G47.33 OBSTRUCTIVE SLEEP APNEA SYNDROME: ICD-10-CM

## 2024-04-18 DIAGNOSIS — L02.91 ABSCESS: ICD-10-CM

## 2024-04-18 PROBLEM — M17.11 OSTEOARTHRITIS OF RIGHT KNEE: Status: ACTIVE | Noted: 2024-04-18

## 2024-04-18 PROBLEM — M23.203 DEGENERATIVE TEAR OF MEDIAL MENISCUS OF RIGHT KNEE: Status: ACTIVE | Noted: 2024-04-18

## 2024-04-18 RX ORDER — CETIRIZINE HYDROCHLORIDE 10 MG/1
TABLET ORAL
COMMUNITY
Start: 2019-11-07

## 2024-04-18 RX ORDER — AMOXICILLIN AND CLAVULANATE POTASSIUM 875; 125 MG/1; MG/1
1 TABLET, FILM COATED ORAL 2 TIMES DAILY
Qty: 14 TABLET | Refills: 0 | Status: SHIPPED | OUTPATIENT
Start: 2024-04-18 | End: 2024-04-25

## 2024-04-18 ASSESSMENT — ENCOUNTER SYMPTOMS
SHORTNESS OF BREATH: 0
ABDOMINAL DISTENTION: 0
ABDOMINAL PAIN: 0
SORE THROAT: 1
WHEEZING: 0

## 2024-04-18 NOTE — PROGRESS NOTES
Roopa Couch (:  1945) is a 78 y.o. female, established patient follow up , here for evaluation of the following:  Follow-up (From walk in /)      Assessment & Plan   ASSESSMENT/PLAN      1. Nasal sore  Acute, just finished course of cefdinir, will switch to Augmentin and use mupirocin, CT of sinus due to right facial swelling  -     mupirocin (BACTROBAN) 2 % ointment; Apply topically 3 times daily., Disp-30 g, R-1, Normal  -     amoxicillin-clavulanate (AUGMENTIN) 875-125 MG per tablet; Take 1 tablet by mouth 2 times daily for 7 days, Disp-14 tablet, R-0Normal  -     CT SINUS WO CONTRAST; Future  2. Swelling of right side of face  4. White coat syndrome with diagnosis of hypertension  Chronic, well controlled at home, has white coat syndrome   5. Primary hypertension    Return if symptoms worsen or fail to improve, for has appointment 2024.         Subjective   Portions of this note were completed by shara Siddiqi during the course of the visit.  All decision-making was completed by, and the note in its entirety was reviewed by myself, Roseline Wade MD.    SUBJECTIVE/OBJECTIVE:  HPI  Pt is here for face swelling an sores in nose    Was seen through walk in care just finished 10 day antibiotics.  Antibiotics helped did help but she is not feeling 100% better   Still having facial pain and some swelling/ fatigue/ sore throat/ headaches/ sores in nose.  Pt thinks facial pain could be due to dental work- following with dentist this week     Exercising 3x a week since February     Labs done , scanned in - LDL elevated. Lifestyle recommended   Declined statin on ASA   MOCHA 2023 could recheck         Declined preventative screening identified as care gaps unless ordered through this visit    PHQ2/PHQ9      No data recorded     Past Medical History:  has a past medical history of Arthritis, Babesiosis, Back pain, Endometrial cancer (HCC), History of blood transfusion, Hyperlipidemia, and

## 2024-05-07 ENCOUNTER — HOSPITAL ENCOUNTER (OUTPATIENT)
Dept: CT IMAGING | Age: 79
Discharge: HOME | End: 2024-05-09
Payer: MEDICARE

## 2024-05-07 DIAGNOSIS — J34.89 NASAL SORE: ICD-10-CM

## 2024-05-07 PROCEDURE — 70486 CT MAXILLOFACIAL W/O DYE: CPT

## 2024-07-09 ENCOUNTER — PATIENT MESSAGE (OUTPATIENT)
Dept: FAMILY MEDICINE CLINIC | Age: 79
End: 2024-07-09

## 2024-07-09 DIAGNOSIS — R73.09 ELEVATED HEMOGLOBIN A1C: ICD-10-CM

## 2024-07-09 DIAGNOSIS — I10 PRIMARY HYPERTENSION: Primary | ICD-10-CM

## 2024-07-09 NOTE — TELEPHONE ENCOUNTER
From: Roopa Couch  To: Dr. Roseline Wade  Sent: 7/9/2024 1:03 PM EDT  Subject: up coming appointment 7/22    what blood work should I get? and how soon? I do not have a printer. I can pick scripts up at your office. I would like a fasting insulin test and will pay out of pocket if not covered. and an A1C, along with what other labs you want. Thank you

## 2024-07-10 NOTE — TELEPHONE ENCOUNTER
1. Primary hypertension  -     Comprehensive Metabolic Panel; Future  -     CBC with Auto Differential; Future  2. Elevated hemoglobin A1c  -     Insulin, total; Future

## 2024-07-16 ENCOUNTER — HOSPITAL ENCOUNTER (OUTPATIENT)
Age: 79
Discharge: HOME | End: 2024-07-16
Payer: MEDICARE

## 2024-07-16 DIAGNOSIS — R73.09 ELEVATED HEMOGLOBIN A1C: ICD-10-CM

## 2024-07-16 DIAGNOSIS — I10 PRIMARY HYPERTENSION: ICD-10-CM

## 2024-07-16 LAB
ALBUMIN SERPL-MCNC: 4.3 G/DL (ref 3.5–5.2)
ALP SERPL-CCNC: 88 U/L (ref 35–104)
ALT SERPL-CCNC: 17 U/L (ref 0–32)
ANION GAP SERPL CALCULATED.3IONS-SCNC: 9 MMOL/L (ref 7–16)
AST SERPL-CCNC: 23 U/L (ref 0–31)
BASOPHILS # BLD: 0.02 K/UL (ref 0–0.2)
BASOPHILS NFR BLD: 0 % (ref 0–2)
BILIRUB SERPL-MCNC: 0.3 MG/DL (ref 0–1.2)
BUN SERPL-MCNC: 17 MG/DL (ref 6–23)
CALCIUM SERPL-MCNC: 9.3 MG/DL (ref 8.6–10.2)
CHLORIDE SERPL-SCNC: 99 MMOL/L (ref 98–107)
CO2 SERPL-SCNC: 25 MMOL/L (ref 22–29)
CREAT SERPL-MCNC: 0.8 MG/DL (ref 0.5–1)
EOSINOPHIL # BLD: 0.24 K/UL (ref 0.05–0.5)
EOSINOPHILS RELATIVE PERCENT: 5 % (ref 0–6)
ERYTHROCYTE [DISTWIDTH] IN BLOOD BY AUTOMATED COUNT: 12.9 % (ref 11.5–15)
GFR, ESTIMATED: 71 ML/MIN/1.73M2
GLUCOSE SERPL-MCNC: 94 MG/DL (ref 74–99)
HCT VFR BLD AUTO: 33.2 % (ref 34–48)
HGB BLD-MCNC: 11.3 G/DL (ref 11.5–15.5)
IMM GRANULOCYTES # BLD AUTO: <0.03 K/UL (ref 0–0.58)
IMM GRANULOCYTES NFR BLD: 0 % (ref 0–5)
LYMPHOCYTES NFR BLD: 1.48 K/UL (ref 1.5–4)
LYMPHOCYTES RELATIVE PERCENT: 31 % (ref 20–42)
MCH RBC QN AUTO: 30.5 PG (ref 26–35)
MCHC RBC AUTO-ENTMCNC: 34 G/DL (ref 32–34.5)
MCV RBC AUTO: 89.5 FL (ref 80–99.9)
MONOCYTES NFR BLD: 0.7 K/UL (ref 0.1–0.95)
MONOCYTES NFR BLD: 15 % (ref 2–12)
NEUTROPHILS NFR BLD: 48 % (ref 43–80)
NEUTS SEG NFR BLD: 2.26 K/UL (ref 1.8–7.3)
PLATELET # BLD AUTO: 281 K/UL (ref 130–450)
PMV BLD AUTO: 9.3 FL (ref 7–12)
POTASSIUM SERPL-SCNC: 4.3 MMOL/L (ref 3.5–5)
PROT SERPL-MCNC: 6.5 G/DL (ref 6.4–8.3)
RBC # BLD AUTO: 3.71 M/UL (ref 3.5–5.5)
SODIUM SERPL-SCNC: 133 MMOL/L (ref 132–146)
WBC OTHER # BLD: 4.7 K/UL (ref 4.5–11.5)

## 2024-07-16 PROCEDURE — 85025 COMPLETE CBC W/AUTO DIFF WBC: CPT

## 2024-07-16 PROCEDURE — 80053 COMPREHEN METABOLIC PANEL: CPT

## 2024-07-16 PROCEDURE — 36415 COLL VENOUS BLD VENIPUNCTURE: CPT

## 2024-07-16 PROCEDURE — 83525 ASSAY OF INSULIN: CPT

## 2024-07-18 LAB
INSULIN REFERENCE RANGE:: NORMAL
INSULIN: 3.7 MU/L

## 2024-07-22 ENCOUNTER — OFFICE VISIT (OUTPATIENT)
Dept: FAMILY MEDICINE CLINIC | Age: 79
End: 2024-07-22
Payer: MEDICARE

## 2024-07-22 VITALS
TEMPERATURE: 97 F | BODY MASS INDEX: 24.15 KG/M2 | OXYGEN SATURATION: 98 % | SYSTOLIC BLOOD PRESSURE: 142 MMHG | DIASTOLIC BLOOD PRESSURE: 70 MMHG | WEIGHT: 123 LBS | HEART RATE: 72 BPM | RESPIRATION RATE: 20 BRPM | HEIGHT: 60 IN

## 2024-07-22 DIAGNOSIS — I10 WHITE COAT SYNDROME WITH DIAGNOSIS OF HYPERTENSION: ICD-10-CM

## 2024-07-22 DIAGNOSIS — Z91.89 CARDIOVASCULAR RISK FACTOR: ICD-10-CM

## 2024-07-22 DIAGNOSIS — I25.10 ARTERIOSCLEROSIS OF CORONARY ARTERY: ICD-10-CM

## 2024-07-22 DIAGNOSIS — G47.33 OBSTRUCTIVE SLEEP APNEA SYNDROME: ICD-10-CM

## 2024-07-22 DIAGNOSIS — I10 PRIMARY HYPERTENSION: Primary | ICD-10-CM

## 2024-07-22 DIAGNOSIS — I65.21 STENOSIS OF RIGHT CAROTID ARTERY: ICD-10-CM

## 2024-07-22 DIAGNOSIS — Z29.11 NEED FOR IMMUNIZATION AGAINST RESPIRATORY SYNCYTIAL VIRUS: ICD-10-CM

## 2024-07-22 DIAGNOSIS — Z78.9 STATIN INTOLERANCE: ICD-10-CM

## 2024-07-22 DIAGNOSIS — E78.1 PURE HYPERTRIGLYCERIDEMIA: ICD-10-CM

## 2024-07-22 DIAGNOSIS — E55.9 VITAMIN D DEFICIENCY: ICD-10-CM

## 2024-07-22 DIAGNOSIS — Z23 NEED FOR COVID-19 VACCINE: ICD-10-CM

## 2024-07-22 PROCEDURE — G8420 CALC BMI NORM PARAMETERS: HCPCS | Performed by: STUDENT IN AN ORGANIZED HEALTH CARE EDUCATION/TRAINING PROGRAM

## 2024-07-22 PROCEDURE — G8400 PT W/DXA NO RESULTS DOC: HCPCS | Performed by: STUDENT IN AN ORGANIZED HEALTH CARE EDUCATION/TRAINING PROGRAM

## 2024-07-22 PROCEDURE — G8427 DOCREV CUR MEDS BY ELIG CLIN: HCPCS | Performed by: STUDENT IN AN ORGANIZED HEALTH CARE EDUCATION/TRAINING PROGRAM

## 2024-07-22 PROCEDURE — 1090F PRES/ABSN URINE INCON ASSESS: CPT | Performed by: STUDENT IN AN ORGANIZED HEALTH CARE EDUCATION/TRAINING PROGRAM

## 2024-07-22 PROCEDURE — 3077F SYST BP >= 140 MM HG: CPT | Performed by: STUDENT IN AN ORGANIZED HEALTH CARE EDUCATION/TRAINING PROGRAM

## 2024-07-22 PROCEDURE — 1123F ACP DISCUSS/DSCN MKR DOCD: CPT | Performed by: STUDENT IN AN ORGANIZED HEALTH CARE EDUCATION/TRAINING PROGRAM

## 2024-07-22 PROCEDURE — 1036F TOBACCO NON-USER: CPT | Performed by: STUDENT IN AN ORGANIZED HEALTH CARE EDUCATION/TRAINING PROGRAM

## 2024-07-22 PROCEDURE — 3078F DIAST BP <80 MM HG: CPT | Performed by: STUDENT IN AN ORGANIZED HEALTH CARE EDUCATION/TRAINING PROGRAM

## 2024-07-22 PROCEDURE — 99214 OFFICE O/P EST MOD 30 MIN: CPT | Performed by: STUDENT IN AN ORGANIZED HEALTH CARE EDUCATION/TRAINING PROGRAM

## 2024-07-22 PROCEDURE — G2211 COMPLEX E/M VISIT ADD ON: HCPCS | Performed by: STUDENT IN AN ORGANIZED HEALTH CARE EDUCATION/TRAINING PROGRAM

## 2024-07-22 ASSESSMENT — ENCOUNTER SYMPTOMS
ABDOMINAL PAIN: 0
WHEEZING: 0
ABDOMINAL DISTENTION: 0
SHORTNESS OF BREATH: 0
COUGH: 0

## 2024-07-22 NOTE — PROGRESS NOTES
Got help me be at a group 4-year-old people  Ropoa Couch (:  1945) is a 78 y.o. female, established patient follow up , here for evaluation of the followin Month Follow-Up      Assessment & Plan   ASSESSMENT/PLAN  Longitudinal patient relationship to use for care of this patient today  Medications reviewed, managed by continuing all, no refills needed    1. Primary hypertension  Chronic, well controlled, continue current medications and treatment plan, due for labs before next visit  -     Comprehensive Metabolic Panel; Future  -     CBC with Auto Differential; Future  -     TSH; Future  2. White coat syndrome with diagnosis of hypertension  Mild elevation here in the office, good blood pressures at home  3. Arteriosclerosis of coronary artery  Patient has concern for CAD, she would like calcium artery scoring, she did have mild coronary artery calcifications on last CT, would like quantification and notes she may be more willing to take a statin if she does have known risk, did discuss that this will only show Rockwell plaques and not soft plaques which could still cause heart problems, she endorses understanding, also has a stenosis of the right carotid artery so discussed that she should likely be on aspirin and statin as is, she will take the aspirin however declined statin, will get this in order to assist in determining her risk, ASCVD risk is greater than 10% however will estimate this patient's personal risk and then start statin if necessary  -     CT CARDIAC CALCIUM SCORING; Future  4. Stenosis of right carotid artery  Chronic, she will likely be on statin, is on aspirin, see #3  -     CT CARDIAC CALCIUM SCORING; Future  5. Obstructive sleep apnea syndrome  Chronic, compliant  6. Need for COVID-19 vaccine  Prescription provided for pharmacy  7. Need for immunization against respiratory syncytial virus  -     respiratory syncytial vaccine, adjuvanted (AREXVY) 120 MCG/0.5ML injection; Inject

## 2024-08-06 ENCOUNTER — OFFICE VISIT (OUTPATIENT)
Dept: PRIMARY CARE CLINIC | Age: 79
End: 2024-08-06

## 2024-08-06 ENCOUNTER — HOSPITAL ENCOUNTER (OUTPATIENT)
Dept: ULTRASOUND IMAGING | Age: 79
Discharge: HOME | End: 2024-08-08
Payer: MEDICARE

## 2024-08-06 VITALS
DIASTOLIC BLOOD PRESSURE: 70 MMHG | HEART RATE: 65 BPM | TEMPERATURE: 97.9 F | WEIGHT: 123 LBS | SYSTOLIC BLOOD PRESSURE: 162 MMHG | BODY MASS INDEX: 24.02 KG/M2 | OXYGEN SATURATION: 97 %

## 2024-08-06 DIAGNOSIS — M79.605 ACUTE LEG PAIN, LEFT: Primary | ICD-10-CM

## 2024-08-06 DIAGNOSIS — M79.605 ACUTE LEG PAIN, LEFT: ICD-10-CM

## 2024-08-06 DIAGNOSIS — M25.562 ACUTE PAIN OF LEFT KNEE: ICD-10-CM

## 2024-08-06 PROCEDURE — 93971 EXTREMITY STUDY: CPT

## 2024-08-06 NOTE — PROGRESS NOTES
Tenderness:  No TTP.              ROM: FROM hip without pain.         Joint(s) Below: left calf/ankle/foot                Tenderness:  No TTP over calf, ankle, or foot without any edema.             ROM: FROM without pain or deficits.       Neurovascular:             Sensory deficit: Sensation intact above and below the injury site.             Pulse deficit: Pulses 2+ and bounding.             Capillary refill: Less then 2 sec throughout.  Gait:  Slightly antalgic gait, but able to bear weight with mild difficulty.  Lymphatics: No lymphangitis or adenopathy noted.  Neurological:  Alert and oriented.  Motor functions intact.    Test Results Section   (All laboratory and radiology results have been personally reviewed by myself)  Labs:    Imaging:  All Radiology results interpreted by Radiologist unless otherwise noted.  No results found.    Assessment / Plan   Impression(s):  Roopa was seen today for leg swelling.    Diagnoses and all orders for this visit:    Acute leg pain, left  -     XR KNEE LEFT (MIN 4 VIEWS)  -     Vascular duplex lower extremity venous left; Future    Acute pain of left knee    Order given for left knee xray and left Ultrasound, will call with results once available. Pt does have Orthopedic appointment on Monday. RICE protocol advised.  I advised the patient that if her ultrasound was positive for DVT that she will go to the emergency room for blood work and medication however, if is negative she can continue to Ace wrap and take over-the-counter medications and apply ice.  ED sooner if symptoms worsen or change. ED immediately with any calf pain/swelling, worsening knee pain, paresthesias, weakness, fever, etc. Pt is in agreement with this care plan. All questions answered.    Electronically signed by LINO Zhou CNP   DD: 8/6/24    **This report was transcribed using voice recognition software. Every effort was made to ensure accuracy; however, inadvertent computerized

## 2024-08-12 ENCOUNTER — HOSPITAL ENCOUNTER (OUTPATIENT)
Dept: CT IMAGING | Age: 79
Discharge: HOME | End: 2024-08-14
Payer: MEDICARE

## 2024-08-12 DIAGNOSIS — I65.21 STENOSIS OF RIGHT CAROTID ARTERY: ICD-10-CM

## 2024-08-12 DIAGNOSIS — Z78.9 STATIN INTOLERANCE: ICD-10-CM

## 2024-08-12 DIAGNOSIS — Z91.89 CARDIOVASCULAR RISK FACTOR: ICD-10-CM

## 2024-08-12 DIAGNOSIS — I25.10 ARTERIOSCLEROSIS OF CORONARY ARTERY: ICD-10-CM

## 2024-08-12 PROCEDURE — 75571 CT HRT W/O DYE W/CA TEST: CPT

## 2024-09-16 LAB — MAMMOGRAPHY, EXTERNAL: NORMAL

## 2024-09-23 ENCOUNTER — OFFICE VISIT (OUTPATIENT)
Dept: SURGERY | Age: 79
End: 2024-09-23
Payer: MEDICARE

## 2024-09-23 VITALS
HEART RATE: 65 BPM | SYSTOLIC BLOOD PRESSURE: 160 MMHG | WEIGHT: 123.4 LBS | OXYGEN SATURATION: 98 % | RESPIRATION RATE: 18 BRPM | HEIGHT: 60 IN | BODY MASS INDEX: 24.23 KG/M2 | DIASTOLIC BLOOD PRESSURE: 81 MMHG | TEMPERATURE: 97.4 F

## 2024-09-23 DIAGNOSIS — K59.09 CHRONIC CONSTIPATION: Primary | ICD-10-CM

## 2024-09-23 PROCEDURE — 1036F TOBACCO NON-USER: CPT | Performed by: SURGERY

## 2024-09-23 PROCEDURE — 99202 OFFICE O/P NEW SF 15 MIN: CPT | Performed by: SURGERY

## 2024-09-23 PROCEDURE — 1090F PRES/ABSN URINE INCON ASSESS: CPT | Performed by: SURGERY

## 2024-09-23 PROCEDURE — G8427 DOCREV CUR MEDS BY ELIG CLIN: HCPCS | Performed by: SURGERY

## 2024-09-23 PROCEDURE — 3077F SYST BP >= 140 MM HG: CPT | Performed by: SURGERY

## 2024-09-23 PROCEDURE — G8420 CALC BMI NORM PARAMETERS: HCPCS | Performed by: SURGERY

## 2024-09-23 PROCEDURE — G8400 PT W/DXA NO RESULTS DOC: HCPCS | Performed by: SURGERY

## 2024-09-23 PROCEDURE — 3079F DIAST BP 80-89 MM HG: CPT | Performed by: SURGERY

## 2024-09-23 PROCEDURE — 1123F ACP DISCUSS/DSCN MKR DOCD: CPT | Performed by: SURGERY

## 2024-09-23 RX ORDER — SODIUM CHLORIDE 9 MG/ML
INJECTION, SOLUTION INTRAVENOUS CONTINUOUS
OUTPATIENT
Start: 2024-09-23

## 2024-09-24 ENCOUNTER — TELEPHONE (OUTPATIENT)
Dept: SURGERY | Age: 79
End: 2024-09-24

## 2024-09-24 DIAGNOSIS — Z86.010 HX OF COLONIC POLYPS: ICD-10-CM

## 2024-09-24 PROBLEM — K59.00 CONSTIPATION: Status: ACTIVE | Noted: 2024-09-24

## 2024-09-24 PROBLEM — Z86.0100 HX OF COLONIC POLYPS: Status: ACTIVE | Noted: 2024-09-24

## 2024-10-06 ENCOUNTER — HOSPITAL ENCOUNTER (EMERGENCY)
Age: 79
Discharge: HOME | End: 2024-10-07
Attending: STUDENT IN AN ORGANIZED HEALTH CARE EDUCATION/TRAINING PROGRAM
Payer: MEDICARE

## 2024-10-06 VITALS
SYSTOLIC BLOOD PRESSURE: 190 MMHG | OXYGEN SATURATION: 99 % | WEIGHT: 120 LBS | HEIGHT: 60 IN | RESPIRATION RATE: 18 BRPM | DIASTOLIC BLOOD PRESSURE: 78 MMHG | HEART RATE: 61 BPM | BODY MASS INDEX: 23.56 KG/M2 | TEMPERATURE: 97.5 F

## 2024-10-06 DIAGNOSIS — M79.604 RIGHT LEG PAIN: Primary | ICD-10-CM

## 2024-10-06 LAB
BASOPHILS # BLD: 0.02 K/UL (ref 0–0.2)
BASOPHILS NFR BLD: 0 % (ref 0–2)
BILIRUB UR QL STRIP: NEGATIVE
CLARITY UR: CLEAR
COLOR UR: YELLOW
EOSINOPHIL # BLD: 0.17 K/UL (ref 0.05–0.5)
EOSINOPHILS RELATIVE PERCENT: 2 % (ref 0–6)
ERYTHROCYTE [DISTWIDTH] IN BLOOD BY AUTOMATED COUNT: 13.1 % (ref 11.5–15)
GLUCOSE UR STRIP-MCNC: NEGATIVE MG/DL
HCT VFR BLD AUTO: 34.7 % (ref 34–48)
HGB BLD-MCNC: 11.9 G/DL (ref 11.5–15.5)
HGB UR QL STRIP.AUTO: NEGATIVE
IMM GRANULOCYTES # BLD AUTO: 0.03 K/UL (ref 0–0.58)
IMM GRANULOCYTES NFR BLD: 0 % (ref 0–5)
INR PPP: 0.9
KETONES UR STRIP-MCNC: NEGATIVE MG/DL
LACTATE BLDV-SCNC: 0.5 MMOL/L (ref 0.5–2.2)
LEUKOCYTE ESTERASE UR QL STRIP: NEGATIVE
LYMPHOCYTES NFR BLD: 2.22 K/UL (ref 1.5–4)
LYMPHOCYTES RELATIVE PERCENT: 32 % (ref 20–42)
MCH RBC QN AUTO: 30.5 PG (ref 26–35)
MCHC RBC AUTO-ENTMCNC: 34.3 G/DL (ref 32–34.5)
MCV RBC AUTO: 89 FL (ref 80–99.9)
MONOCYTES NFR BLD: 0.89 K/UL (ref 0.1–0.95)
MONOCYTES NFR BLD: 13 % (ref 2–12)
NEUTROPHILS NFR BLD: 52 % (ref 43–80)
NEUTS SEG NFR BLD: 3.63 K/UL (ref 1.8–7.3)
NITRITE UR QL STRIP: NEGATIVE
PARTIAL THROMBOPLASTIN TIME: 32.5 SEC (ref 24.5–35.1)
PH UR STRIP: 6 [PH] (ref 5–9)
PLATELET # BLD AUTO: 286 K/UL (ref 130–450)
PMV BLD AUTO: 9.1 FL (ref 7–12)
PROT UR STRIP-MCNC: NEGATIVE MG/DL
PROTHROMBIN TIME: 10.1 SEC (ref 9.3–12.4)
RBC # BLD AUTO: 3.9 M/UL (ref 3.5–5.5)
RBC #/AREA URNS HPF: NORMAL /HPF
SP GR UR STRIP: 1.01 (ref 1–1.03)
UROBILINOGEN UR STRIP-ACNC: 0.2 EU/DL (ref 0–1)
WBC #/AREA URNS HPF: NORMAL /HPF
WBC OTHER # BLD: 7 K/UL (ref 4.5–11.5)

## 2024-10-06 PROCEDURE — 81001 URINALYSIS AUTO W/SCOPE: CPT

## 2024-10-06 PROCEDURE — 80053 COMPREHEN METABOLIC PANEL: CPT

## 2024-10-06 PROCEDURE — 99285 EMERGENCY DEPT VISIT HI MDM: CPT

## 2024-10-06 PROCEDURE — 83605 ASSAY OF LACTIC ACID: CPT

## 2024-10-06 PROCEDURE — 85025 COMPLETE CBC W/AUTO DIFF WBC: CPT

## 2024-10-06 PROCEDURE — 6370000000 HC RX 637 (ALT 250 FOR IP): Performed by: STUDENT IN AN ORGANIZED HEALTH CARE EDUCATION/TRAINING PROGRAM

## 2024-10-06 PROCEDURE — 85610 PROTHROMBIN TIME: CPT

## 2024-10-06 PROCEDURE — 85730 THROMBOPLASTIN TIME PARTIAL: CPT

## 2024-10-06 RX ORDER — IBUPROFEN 200 MG
600 TABLET ORAL EVERY 6 HOURS PRN
COMMUNITY

## 2024-10-06 RX ORDER — HYDROCODONE BITARTRATE AND ACETAMINOPHEN 5; 325 MG/1; MG/1
1 TABLET ORAL ONCE
Status: COMPLETED | OUTPATIENT
Start: 2024-10-06 | End: 2024-10-06

## 2024-10-06 RX ADMIN — HYDROCODONE BITARTRATE AND ACETAMINOPHEN 1 TABLET: 5; 325 TABLET ORAL at 23:25

## 2024-10-06 ASSESSMENT — PAIN DESCRIPTION - ORIENTATION
ORIENTATION: RIGHT;LEFT;LOWER
ORIENTATION: RIGHT;LOWER

## 2024-10-06 ASSESSMENT — PAIN DESCRIPTION - LOCATION
LOCATION: LEG
LOCATION: LEG;BACK;GROIN

## 2024-10-06 ASSESSMENT — PAIN DESCRIPTION - ONSET: ONSET: ON-GOING

## 2024-10-06 ASSESSMENT — PAIN DESCRIPTION - FREQUENCY: FREQUENCY: CONTINUOUS

## 2024-10-06 ASSESSMENT — PAIN - FUNCTIONAL ASSESSMENT
PAIN_FUNCTIONAL_ASSESSMENT: 0-10
PAIN_FUNCTIONAL_ASSESSMENT: PREVENTS OR INTERFERES WITH MANY ACTIVE NOT PASSIVE ACTIVITIES
PAIN_FUNCTIONAL_ASSESSMENT: ACTIVITIES ARE NOT PREVENTED

## 2024-10-06 ASSESSMENT — PAIN SCALES - GENERAL
PAINLEVEL_OUTOF10: 6
PAINLEVEL_OUTOF10: 4

## 2024-10-06 ASSESSMENT — PAIN DESCRIPTION - DESCRIPTORS: DESCRIPTORS: ACHING;SORE

## 2024-10-07 ENCOUNTER — HOSPITAL ENCOUNTER (OUTPATIENT)
Dept: ULTRASOUND IMAGING | Age: 79
Discharge: HOME | End: 2024-10-09
Attending: STUDENT IN AN ORGANIZED HEALTH CARE EDUCATION/TRAINING PROGRAM
Payer: MEDICARE

## 2024-10-07 ENCOUNTER — APPOINTMENT (OUTPATIENT)
Dept: CT IMAGING | Age: 79
End: 2024-10-07
Payer: MEDICARE

## 2024-10-07 DIAGNOSIS — M79.604 RIGHT LEG PAIN: ICD-10-CM

## 2024-10-07 LAB
ALBUMIN SERPL-MCNC: 5.1 G/DL (ref 3.5–5.2)
ALP SERPL-CCNC: 95 U/L (ref 35–104)
ALT SERPL-CCNC: 17 U/L (ref 0–32)
ANION GAP SERPL CALCULATED.3IONS-SCNC: 12 MMOL/L (ref 7–16)
AST SERPL-CCNC: 24 U/L (ref 0–31)
BILIRUB SERPL-MCNC: 0.2 MG/DL (ref 0–1.2)
BUN SERPL-MCNC: 22 MG/DL (ref 6–23)
CALCIUM SERPL-MCNC: 9.9 MG/DL (ref 8.6–10.2)
CHLORIDE SERPL-SCNC: 97 MMOL/L (ref 98–107)
CO2 SERPL-SCNC: 24 MMOL/L (ref 22–29)
CREAT SERPL-MCNC: 0.9 MG/DL (ref 0.5–1)
GFR, ESTIMATED: 70 ML/MIN/1.73M2
GLUCOSE SERPL-MCNC: 97 MG/DL (ref 74–99)
POTASSIUM SERPL-SCNC: 4 MMOL/L (ref 3.5–5)
PROT SERPL-MCNC: 7.5 G/DL (ref 6.4–8.3)
SODIUM SERPL-SCNC: 133 MMOL/L (ref 132–146)

## 2024-10-07 PROCEDURE — 93971 EXTREMITY STUDY: CPT

## 2024-10-07 PROCEDURE — 74177 CT ABD & PELVIS W/CONTRAST: CPT

## 2024-10-07 PROCEDURE — 73562 X-RAY EXAM OF KNEE 3: CPT

## 2024-10-07 PROCEDURE — 73590 X-RAY EXAM OF LOWER LEG: CPT

## 2024-10-07 PROCEDURE — 6360000004 HC RX CONTRAST MEDICATION: Performed by: RADIOLOGY

## 2024-10-07 RX ORDER — IOPAMIDOL 755 MG/ML
75 INJECTION, SOLUTION INTRAVASCULAR
Status: COMPLETED | OUTPATIENT
Start: 2024-10-07 | End: 2024-10-07

## 2024-10-07 RX ADMIN — IOPAMIDOL 75 ML: 755 INJECTION, SOLUTION INTRAVENOUS at 00:23

## 2024-10-07 ASSESSMENT — PAIN - FUNCTIONAL ASSESSMENT: PAIN_FUNCTIONAL_ASSESSMENT: NONE - DENIES PAIN

## 2024-10-07 NOTE — ED PROVIDER NOTES
HPI   This is a 79-year-old female patient presents emergency department for evaluation of right leg pain, swelling, right groin pain.  No history of DVT.  She does note history of sciatica, having shooting pains down from her back on the right side to the leg however she noticed some right lateral lower leg swelling that has been ongoing for about a week of the tib-fib area.  She is also having some right inner groin pain no known trauma or falls.  No history of DVT or PE.  No chest pain or shortness of breath.  Review of Systems   Constitutional:  Negative for chills and fever.   HENT:  Negative for congestion.    Respiratory:  Negative for cough and shortness of breath.    Cardiovascular:  Negative for chest pain.   Gastrointestinal:  Positive for abdominal pain. Negative for diarrhea, nausea and vomiting.   Genitourinary:  Negative for difficulty urinating, dysuria and hematuria.   Musculoskeletal:  Negative for back pain.        Right leg pain and inguinal pain   Skin:  Negative for color change.   All other systems reviewed and are negative.       Physical Exam  Vitals and nursing note reviewed.   Constitutional:       Appearance: Normal appearance.   HENT:      Head: Normocephalic and atraumatic.      Nose: Nose normal. No congestion.      Mouth/Throat:      Mouth: Mucous membranes are moist.      Pharynx: Oropharynx is clear.   Eyes:      Conjunctiva/sclera: Conjunctivae normal.      Pupils: Pupils are equal, round, and reactive to light.   Cardiovascular:      Rate and Rhythm: Normal rate and regular rhythm.      Pulses: Normal pulses.      Heart sounds: Normal heart sounds.   Pulmonary:      Effort: Pulmonary effort is normal. No respiratory distress.      Breath sounds: Normal breath sounds.   Abdominal:      General: There is no distension.      Tenderness: There is no abdominal tenderness.      Comments: Right inguinal tenderness and right lower quadrant tenderness   Musculoskeletal:         General:

## 2024-10-08 ENCOUNTER — OFFICE VISIT (OUTPATIENT)
Dept: FAMILY MEDICINE CLINIC | Age: 79
End: 2024-10-08
Payer: MEDICARE

## 2024-10-08 VITALS
HEIGHT: 60 IN | OXYGEN SATURATION: 96 % | WEIGHT: 122 LBS | BODY MASS INDEX: 23.95 KG/M2 | DIASTOLIC BLOOD PRESSURE: 58 MMHG | TEMPERATURE: 98.9 F | SYSTOLIC BLOOD PRESSURE: 138 MMHG | HEART RATE: 78 BPM

## 2024-10-08 DIAGNOSIS — I25.10 ARTERIOSCLEROSIS OF CORONARY ARTERY: ICD-10-CM

## 2024-10-08 DIAGNOSIS — I10 WHITE COAT SYNDROME WITH DIAGNOSIS OF HYPERTENSION: ICD-10-CM

## 2024-10-08 DIAGNOSIS — M54.10 RADICULAR PAIN OF RIGHT LOWER EXTREMITY: ICD-10-CM

## 2024-10-08 DIAGNOSIS — Z78.9 STATIN INTOLERANCE: ICD-10-CM

## 2024-10-08 DIAGNOSIS — M79.89 RIGHT LEG SWELLING: Primary | ICD-10-CM

## 2024-10-08 DIAGNOSIS — I10 PRIMARY HYPERTENSION: ICD-10-CM

## 2024-10-08 DIAGNOSIS — Z91.89 CARDIOVASCULAR RISK FACTOR: ICD-10-CM

## 2024-10-08 PROCEDURE — 1123F ACP DISCUSS/DSCN MKR DOCD: CPT | Performed by: STUDENT IN AN ORGANIZED HEALTH CARE EDUCATION/TRAINING PROGRAM

## 2024-10-08 PROCEDURE — 1036F TOBACCO NON-USER: CPT | Performed by: STUDENT IN AN ORGANIZED HEALTH CARE EDUCATION/TRAINING PROGRAM

## 2024-10-08 PROCEDURE — 99214 OFFICE O/P EST MOD 30 MIN: CPT | Performed by: STUDENT IN AN ORGANIZED HEALTH CARE EDUCATION/TRAINING PROGRAM

## 2024-10-08 PROCEDURE — G8427 DOCREV CUR MEDS BY ELIG CLIN: HCPCS | Performed by: STUDENT IN AN ORGANIZED HEALTH CARE EDUCATION/TRAINING PROGRAM

## 2024-10-08 PROCEDURE — 1090F PRES/ABSN URINE INCON ASSESS: CPT | Performed by: STUDENT IN AN ORGANIZED HEALTH CARE EDUCATION/TRAINING PROGRAM

## 2024-10-08 PROCEDURE — G8420 CALC BMI NORM PARAMETERS: HCPCS | Performed by: STUDENT IN AN ORGANIZED HEALTH CARE EDUCATION/TRAINING PROGRAM

## 2024-10-08 PROCEDURE — G8400 PT W/DXA NO RESULTS DOC: HCPCS | Performed by: STUDENT IN AN ORGANIZED HEALTH CARE EDUCATION/TRAINING PROGRAM

## 2024-10-08 PROCEDURE — G8484 FLU IMMUNIZE NO ADMIN: HCPCS | Performed by: STUDENT IN AN ORGANIZED HEALTH CARE EDUCATION/TRAINING PROGRAM

## 2024-10-08 PROCEDURE — 3075F SYST BP GE 130 - 139MM HG: CPT | Performed by: STUDENT IN AN ORGANIZED HEALTH CARE EDUCATION/TRAINING PROGRAM

## 2024-10-08 PROCEDURE — G2211 COMPLEX E/M VISIT ADD ON: HCPCS | Performed by: STUDENT IN AN ORGANIZED HEALTH CARE EDUCATION/TRAINING PROGRAM

## 2024-10-08 PROCEDURE — 3078F DIAST BP <80 MM HG: CPT | Performed by: STUDENT IN AN ORGANIZED HEALTH CARE EDUCATION/TRAINING PROGRAM

## 2024-10-08 RX ORDER — SIMVASTATIN 20 MG
20 TABLET ORAL NIGHTLY
Qty: 90 TABLET | Refills: 1 | Status: SHIPPED | OUTPATIENT
Start: 2024-10-08

## 2024-10-08 RX ORDER — PREDNISONE 20 MG/1
40 TABLET ORAL DAILY
Qty: 10 TABLET | Refills: 0 | Status: SHIPPED | OUTPATIENT
Start: 2024-10-08 | End: 2024-10-13

## 2024-10-08 RX ORDER — AMLODIPINE BESYLATE 2.5 MG/1
2.5 TABLET ORAL DAILY
Qty: 90 TABLET | Refills: 0 | Status: SHIPPED | OUTPATIENT
Start: 2024-10-08

## 2024-10-08 ASSESSMENT — ENCOUNTER SYMPTOMS
SHORTNESS OF BREATH: 0
WHEEZING: 0
ABDOMINAL PAIN: 0
COUGH: 0
ABDOMINAL DISTENTION: 0
BACK PAIN: 1

## 2024-10-08 NOTE — PROGRESS NOTES
Roopa Couch (:  1945) is a 79 y.o. female, established patient follow up , here for evaluation of the following:  Follow-up (Right leg pain and swelling.)      Assessment & Plan   ASSESSMENT/PLAN      1. Right leg swelling  Right leg swelling, seen in the ED, negative DVT ultrasound, she does have a small patch of tender raised tissue on the anterior calf, does not seem to be infected, recommended continued massage, Voltaren gel, heat or ice as is comfortable, unclear cause however could have been to increase activity, she did play [employer] ball, does exercise regularly  2. White coat syndrome with diagnosis of hypertension  3. Primary hypertension  Chronic, elevated here, was very high in the emergency room, is agreeable to start very low-dose of blood pressure medication  -     amLODIPine (NORVASC) 2.5 MG tablet; Take 1 tablet by mouth daily, Disp-90 tablet, R-0Normal  4. Arteriosclerosis of coronary artery  Chronic, not well-controlled, will start new medication per below, she notes she has tolerated the statin in the past  -     simvastatin (ZOCOR) 20 MG tablet; Take 1 tablet by mouth nightly, Disp-90 tablet, R-1Normal  5. Cardiovascular risk factor  -     simvastatin (ZOCOR) 20 MG tablet; Take 1 tablet by mouth nightly, Disp-90 tablet, R-1Normal  6. Statin intolerance  7. Radicular pain of right lower extremity  She does have known sacral and lumbar pathology, she does plan to go see pain management, will trial prednisone in the meantime  -     predniSONE (DELTASONE) 20 MG tablet; Take 2 tablets by mouth daily for 5 days, Disp-10 tablet, R-0Normal    No follow-ups on file.         Subjective   SUBJECTIVE/OBJECTIVE:  VANITA Blas is here for follow-up after emergency room visit yesterday. She notes she has been walking 1.5 miles on treadmill and had been doing well. She notes then back started hurting and pain shot down leg and legs were numb. She feels hot and swollen outer right calf, walking painful

## 2024-10-23 ENCOUNTER — OFFICE VISIT (OUTPATIENT)
Age: 79
End: 2024-10-23

## 2024-10-23 VITALS
TEMPERATURE: 97.9 F | BODY MASS INDEX: 23.95 KG/M2 | WEIGHT: 122 LBS | OXYGEN SATURATION: 98 % | HEART RATE: 66 BPM | RESPIRATION RATE: 16 BRPM | HEIGHT: 60 IN | DIASTOLIC BLOOD PRESSURE: 62 MMHG | SYSTOLIC BLOOD PRESSURE: 137 MMHG

## 2024-10-23 DIAGNOSIS — Z85.42 HISTORY OF ENDOMETRIAL CANCER: ICD-10-CM

## 2024-10-23 DIAGNOSIS — M48.062 SPINAL STENOSIS OF LUMBAR REGION WITH NEUROGENIC CLAUDICATION: ICD-10-CM

## 2024-10-23 DIAGNOSIS — G89.4 CHRONIC PAIN SYNDROME: ICD-10-CM

## 2024-10-23 DIAGNOSIS — M46.1 SACROILIITIS (HCC): Primary | ICD-10-CM

## 2024-10-23 DIAGNOSIS — M54.16 LUMBAR RADICULOPATHY: ICD-10-CM

## 2024-10-23 NOTE — PROGRESS NOTES
Roopa Couch presents to the [facility] on 10/23/2024. Roopa is complaining of pain lower back radiates to right leg. The pain is intermittent. The pain is described as aching, throbbing, and burning. Pain is rated on her best day at a 2, on her worst day at a 7, and on average at a 5 on the VAS scale. She took her last dose of Tylenol today.     Any procedures since your last visit: No,    Pacemaker or defibrillator: No     She is  on NSAIDS and is  on anticoagulation medications to include ASA and is managed by Roseline Wade MD  .     Medication Contract and Consent for Opioid Use Documents Filed        No documents found                    /62   Pulse 66   Temp 97.9 °F (36.6 °C) (Infrared)   Resp 16   Ht 1.524 m (5')   Wt 55.3 kg (122 lb)   SpO2 98%   BMI 23.83 kg/m²      No LMP recorded. Patient has had a hysterectomy.    
  Pulse 66   Temp 97.9 °F (36.6 °C) (Infrared)   Resp 16   Ht 1.524 m (5')   Wt 55.3 kg (122 lb)   SpO2 98%   BMI 23.83 kg/m²     General:  Pleasant in no distress and A & O x 3, Build:Normal Weight, Function: Rises from seated position easily      HEENT:normocephalic, atraumatic, Pupils regular, round, equal Sclera: icterus absent      Lungs: Breathing:normal breath pattern, unlabored     CVS: RRR, pulses intact b/l     Abdomen: non-distended and normal Non tender, no guarding.      Cervical spine: Inspection: normal   Palpation: No tenderness over the midline and paraspinal area, bilaterally   Range of motion: Normal flexion, extension, rotation bilaterally and is not painful.  Spurling's: negative bilaterally   Hernández's: negative bilaterally      Thoracic spine: Inspection: normal   Palpation:No tenderness over the midline and paraspinal area, bilaterally  Range of motion: normal in flexion, extension rotation bilateral and is not painful.      Lumbar spine: Inspection: normal   Spine Range of motion: Normal, flexion Normal, Lateral bending, extension and rotation bilaterally reduced is somewhat painful.   Palpation:tenderness paravertebral muscles and midline tenderness.  Facet Loading: left-negative and right-negative.     Musculoskeletal:  Sacroiliac joint compression - positive  right  SI J distraction negative  CARLIE test: positive right  Gaenslen's test: positive right  Piriformis tenderness: negative bilaterally   SLR : negative bilaterally   Trochanteric bursa tenderness: negative bilaterally   CVA tenderness: No       Extremities:  Tremors: None bilaterally upper and lower   Range of motion:  grossly normal  Shoulders: Generally normal shoulders ,    Hips: no pain with internal rotation of hips   Varicose veins: absent    Pulses: present Lt radial   Cyanosis: none   Edema: none x all 4 extremities      Neurological: Sensory:normal to light touch   , CN 2-12 grossly intact      MOTOR Left (x/5)

## 2024-11-18 NOTE — PROGRESS NOTES
[facility] PRE-ADMISSION TESTING INSTRUCTIONS    The Preadmission Testing patient is instructed accordingly using the following criteria (check applicable):    ARRIVAL INSTRUCTIONS:  [x] Parking the day of Surgery is located in the Main Entrance lot.  Upon entering the door, make an immediate right to the surgery reception desk    [x] Bring photo ID and insurance card    [] Bring in a copy of Living will or Durable Power of  papers.    [x] Please be sure to arrange for a responsible adult to provide transportation to and from the hospital    [x] Please arrange for a responsible adult to be with you for the 24 hour period post procedure due to having anesthesia    [x] If you awake am of surgery not feeling well or have temperature >100 please call [phone]    GENERAL INSTRUCTIONS:    [x]  may have up to 8oz of water until 4 hours prior to surgery. No gum, no candy, no mints. NPO time:       [x] You may brush your teeth, do not swallow any toothpaste    [x] Take medications as instructed     [x] Stop herbal supplements and vitamins 5 days prior to procedure    [x] Follow preop dosing of blood thinners per physician instructions    [] Take 1/2 dose of evening insulin, but no insulin after midnight    [] No oral diabetic medications after midnight    [] If diabetic and have low blood sugar or feel symptomatic, take 1-2oz apple juice only    [] Bring inhalers day of surgery    [] Bring urine specimen day of surgery    [x] Shower or bath with soap, lather and rinse well, AM of Surgery, no lotion, powders or creams to surgical site    [x] Follow bowel prep as instructed per surgeon    [x] No tobacco products within 24 hours of surgery     [x] No alcohol or illegal drug use, marijuana included, within 24 hours of surgery.    [x] Jewelry, body piercing's, eyeglasses, contact lenses and dentures are not permitted into surgery (bring cases)      [x] Please do not wear any nail polish,  make up or hair products on the day of surgery    [x] You can expect a call the business day prior to procedure to notify you if your arrival time changes    [x] If you receive a survey after surgery we would greatly appreciate your comments    [] Parent/guardian of a minor must accompany their child and remain on the premises  the entire time they are under our care     [] Pediatric patients may bring favorite toy, blanket or comfort item with them    [] A caregiver or family member must remain with the patient during their stay if they are mentally handicapped, have dementia, disoriented or unable to use a call light or would be a safety concern if left unattended    [x] Please notify surgeon if you develop any illness between now and time of surgery (cold, cough, sore throat, fever, nausea, vomiting) or any signs of infections  including skin, wounds, and dental.    []  The Outpatient Pharmacy is available to fill your prescription here on your day of surgery, ask your preop nurse for details    [] Other instructions    EDUCATIONAL MATERIALS PROVIDED:    [] PAT Preoperative Education Packet/Booklet     [] Medication List    [] Transfusion bracelet applied with instructions    [] Shower with soap, lather and rinse well, and use CHG wipes provided the evening before surgery as instructed    [] Incentive spirometer with instructions

## 2024-11-20 ENCOUNTER — HOSPITAL ENCOUNTER (OUTPATIENT)
Age: 79
Setting detail: OUTPATIENT SURGERY
Discharge: HOME | End: 2024-11-20
Attending: SURGERY | Admitting: SURGERY
Payer: MEDICARE

## 2024-11-20 ENCOUNTER — ANESTHESIA (OUTPATIENT)
Dept: ENDOSCOPY | Age: 79
End: 2024-11-20
Payer: MEDICARE

## 2024-11-20 ENCOUNTER — ANESTHESIA EVENT (OUTPATIENT)
Dept: ENDOSCOPY | Age: 79
End: 2024-11-20
Payer: MEDICARE

## 2024-11-20 VITALS
WEIGHT: 122 LBS | OXYGEN SATURATION: 98 % | BODY MASS INDEX: 23.95 KG/M2 | HEART RATE: 62 BPM | SYSTOLIC BLOOD PRESSURE: 139 MMHG | HEIGHT: 60 IN | DIASTOLIC BLOOD PRESSURE: 74 MMHG | TEMPERATURE: 97.9 F | RESPIRATION RATE: 16 BRPM

## 2024-11-20 PROCEDURE — 3700000001 HC ADD 15 MINUTES (ANESTHESIA): Performed by: SURGERY

## 2024-11-20 PROCEDURE — 6360000002 HC RX W HCPCS: Performed by: NURSE ANESTHETIST, CERTIFIED REGISTERED

## 2024-11-20 PROCEDURE — 7100000011 HC PHASE II RECOVERY - ADDTL 15 MIN: Performed by: SURGERY

## 2024-11-20 PROCEDURE — 45378 DIAGNOSTIC COLONOSCOPY: CPT | Performed by: SURGERY

## 2024-11-20 PROCEDURE — 2580000003 HC RX 258: Performed by: SURGERY

## 2024-11-20 PROCEDURE — 7100000010 HC PHASE II RECOVERY - FIRST 15 MIN: Performed by: SURGERY

## 2024-11-20 PROCEDURE — 2500000003 HC RX 250 WO HCPCS: Performed by: NURSE ANESTHETIST, CERTIFIED REGISTERED

## 2024-11-20 PROCEDURE — 2709999900 HC NON-CHARGEABLE SUPPLY: Performed by: SURGERY

## 2024-11-20 PROCEDURE — 3609027000 HC COLONOSCOPY: Performed by: SURGERY

## 2024-11-20 PROCEDURE — 3700000000 HC ANESTHESIA ATTENDED CARE: Performed by: SURGERY

## 2024-11-20 RX ORDER — SODIUM CHLORIDE 9 MG/ML
INJECTION, SOLUTION INTRAVENOUS CONTINUOUS
Status: DISCONTINUED | OUTPATIENT
Start: 2024-11-20 | End: 2024-11-20 | Stop reason: HOSPADM

## 2024-11-20 RX ORDER — LIDOCAINE HYDROCHLORIDE 20 MG/ML
INJECTION, SOLUTION EPIDURAL; INFILTRATION; INTRACAUDAL; PERINEURAL
Status: DISCONTINUED | OUTPATIENT
Start: 2024-11-20 | End: 2024-11-20 | Stop reason: SDUPTHER

## 2024-11-20 RX ORDER — PROPOFOL 10 MG/ML
INJECTION, EMULSION INTRAVENOUS
Status: DISCONTINUED | OUTPATIENT
Start: 2024-11-20 | End: 2024-11-20 | Stop reason: SDUPTHER

## 2024-11-20 RX ADMIN — LIDOCAINE HYDROCHLORIDE 5 ML: 20 INJECTION, SOLUTION EPIDURAL; INFILTRATION; INTRACAUDAL; PERINEURAL at 08:06

## 2024-11-20 RX ADMIN — SODIUM CHLORIDE: 9 INJECTION, SOLUTION INTRAVENOUS at 08:07

## 2024-11-20 RX ADMIN — PROPOFOL 210 MG: 10 INJECTION, EMULSION INTRAVENOUS at 08:06

## 2024-11-20 ASSESSMENT — PAIN - FUNCTIONAL ASSESSMENT: PAIN_FUNCTIONAL_ASSESSMENT: 0-10

## 2024-11-20 NOTE — ANESTHESIA POSTPROCEDURE EVALUATION
Department of Anesthesiology  Postprocedure Note    Patient: Roopa Couch  MRN: [id number]  YOB: 1945  Date of evaluation: 11/20/2024    Procedure Summary       Date: 11/20/24 Room / Location: [place] [address] / [facility]    Anesthesia Start: 0805 Anesthesia Stop: 0828    Procedure: COLONOSCOPY DIAGNOSTIC Diagnosis:       Hx of colonic polyps      Constipation      (Hx of colonic polyps [Z86.010])      (Constipation [K59.00])    Surgeons: Roseline Arenas MD Responsible Provider: Ricky Renee MD    Anesthesia Type: MAC ASA Status: 3            Anesthesia Type: MAC    Orlin Phase I: Orlin Score: 10    Orlin Phase II:      Anesthesia Post Evaluation    Patient location during evaluation: bedside  Patient participation: complete - patient participated  Level of consciousness: awake  Pain score: 0  Airway patency: patent  Nausea & Vomiting: no nausea and no vomiting  Cardiovascular status: blood pressure returned to baseline and hemodynamically stable  Respiratory status: acceptable  Hydration status: stable  Pain management: adequate and satisfactory to patient        No notable events documented.

## 2024-11-20 NOTE — H&P
Patient's office history and physical was reviewed.    Patient examined.    There has been no change in the patient's history and physical.      Physician Signature: Electronically signed by Dr. Roseline Arenas    General Surgery History and Physical    Patient's Name/Date of Birth: Roopa Couch / 1945    Date: 11/20/2024    PCP: Roseline Wade MD    Referring Physician:   No ref. provider found  N/A    CHIEF COMPLAINT:    No chief complaint on file.        HISTORY OF PRESENT ILLNESS:    Roopa Couch is an 79 y.o. female who presents for a colonoscopy. She has chronic constipation. No nausea, vomiting, diarrhea, constipation. No changes in stool caliber. No bloody or black stools. No abdominal pain. No unintentional weight loss. No family history of colon cancer. The patient has a known history of: colon polyps. The patient has had a colonoscopy before - history of uterine cancer and colon polyps.    Past Medical History:   Past Medical History:   Diagnosis Date    Arthritis     Babesiosis 2009    tick disease; treated    Back pain     Carotid artery stenosis     Endometrial cancer (HCC) 11/21 2019 approx    new dx    Hearing loss     bilat hearing aids    History of blood transfusion     Hyperlipidemia     not on any medications    GENNY on CPAP         Past Surgical History:   Past Surgical History:   Procedure Laterality Date    BUNIONECTOMY  2006 2007    bilateral    CARPAL TUNNEL RELEASE Bilateral     COLONOSCOPY      COLONOSCOPY N/A 12/04/2019    COLONOSCOPY WITH BIOPSY performed by Roseline Arenas MD at [facility]    CYST REMOVAL  2010    right hand    FINGER TRIGGER RELEASE Left 12/18/2018    left ring finger.    HYSTERECTOMY (CERVIX STATUS UNKNOWN)  12/17/2019    [facility]    HYSTERECTOMY, VAGINAL  2019    INJECTION Right 06/2021    Patient had right knee injection    KNEE ARTHROPLASTY  2016    NERVE BLOCK Left 06/06/2023    LEFT SACROILIAC JOINT INJECTION UNDER FLUOROSCOPIC GUIDANCE

## 2024-11-20 NOTE — OP NOTE
Operative Note: Colonoscopy    Roopa Couch     DATE OF PROCEDURE: 11/20/2024  SURGEON: Dr. TK BERGMAN MD, M.D.     PREOPERATIVE DIAGNOSES:    History of colon polyps  Chronic constipation    POSTOPERATIVE DIAGNOSES:  Tortuous colon    SPECIMENS:  * No specimens in log *     OPERATION:   Colonoscopy to the cecum      ANESTHESIA: LMAC    COMPLICATIONS: None.     BLOOD LOSS: Minimal    Procedure Note:    CONSENT AND INDICATIONS:  This is a 79 y.o. year old female who is having the above.  I have discussed with the patient and/or the patient representative the indication, alternatives, and the possible risks and/or complications of the planned procedure and the anesthesia methods. The patient and/or patient representative appear to understand and agree to proceed.    PROCEDURE: Bowel prep was done yesterday until the bowels were clear. The patient was placed on the table and sedated by anesthesia. A rectal exam was performed and no mass was felt. A lubricated scope was passed into the rectum which looked normal.  The scope was passed all the way around through the sigmoid, descending, transverse and ascending colon to the cecum. The bowel prep was clear. The colon was tortuous. The cecum was identified by the appendiceal orifice, ileocecal valve, and light reflex in the RLQ.The scope was then slowly withdrawn, each area was examined again on the way out.  No other abnormalities were seen.  The scope was retroflexed in the rectum and it was normal . Withdrawal time was at least 6 minutes. The patient tolerated the procedure well.     PLAN:    Follow up colonoscopy in 5 years.    Physician Signature: Electronically signed by Dr. TK BERGMAN MD MAntonD. FACS    Send copy of H&P to PCP, Tk Wade MD and referring physician, No ref. provider found

## 2024-11-20 NOTE — DISCHARGE INSTRUCTIONS
[facility] Colonoscopy PROCEDURE DISCHARGE INSTRUCTIONS  You may be drowsy or lightheaded after receiving sedation or anesthesia.    A responsible person should be with you for the next 24 hours.    Please follow the instructions checked below:    DIET INSTRUCTIONS:  [x]Start with light diet and progress to your normal diet as you feel like eating. If you experience nausea or repeated episodes of vomiting which persist beyond 12-24 hours, notify your doctor.  []Other     ACTIVITY INSTRUCTIONS:  [x]Rest today. Increase activity as tolerated    []No heavy lifting or strenuous activity     [x]No driving for today  []Other      MEDICATION INSTRUCTIONS:    []Prescriptions sent with you.  Use as directed.  When taking pain medications, you may experience dizziness or drowsiness.  Do not drink alcohol or drive when taking these medications.  [x]Continue preop medications                               Post-procedure Care   If any tissue was removed:   It will be sent to a lab to be examined. It may take 1-2 weeks for results. The doctor will usually give an initial report after the scope is removed. Other tests may be recommended.   A small amount of bleeding may occur during the first few days after the procedure.     When you return home after the procedure, be sure to follow your doctor's instructions, which may include:   Resume medicines as instructed by your doctor.   Resume normal diet, unless directed otherwise by your doctor.   The sedative will make you drowsy. Avoid driving, operating machinery, or making important decisions for the rest of the day.   Rest for the remainder of the day.     After arriving home, contact your doctor if any of the following occurs:   Bleeding from your rectum, notify your doctor if you pass a teaspoonful of blood or more.   Black, tarry stools   Severe abdominal pain   Hard, swollen abdomen   Signs of infection, including fever or chills   Inability to pass gas or

## 2024-11-20 NOTE — ANESTHESIA PRE PROCEDURE
Department of Anesthesiology  Preprocedure Note       Name:  Roopa Couch   Age:  79 y.o.  :  1945                                          MRN:  [id number]         Date:  2024      Surgeon: Surgeon(s):  Roseline Arenas MD    Procedure: COLONOSCOPY DIAGNOSTIC    Medications prior to admission:   Prior to Admission medications    Medication Sig Start Date End Date Taking? Authorizing Provider   simvastatin (ZOCOR) 20 MG tablet Take 1 tablet by mouth nightly  Patient not taking: Reported on 2024 10/8/24   Roseline Wade MD   ibuprofen (ADVIL;MOTRIN) 200 MG tablet Take 3 tablets by mouth every 6 hours as needed for Pain    Arcelia Dubois MD   Flaxseed, Linseed, (FLAX SEED OIL) 1000 MG CAPS Take 1,000 mg by mouth daily    Arcelia Dubois MD   aspirin 325 MG EC tablet Take 1 tablet by mouth daily 24   Roseline Wade MD   Glucosamine-Chondroitin (GLUCOSAMINE CHONDR COMPLEX PO) Take by mouth    Arcelia Dubois MD   turmeric 500 MG CAPS Take by mouth daily    Arcelia Dubois MD   acetaminophen (TYLENOL) 325 MG tablet Take 2 tablets by mouth every 6 hours as needed for Pain    Arcelia Dubois MD   vitamin D-3 (CHOLECALCIFEROL) 5000 UNITS TABS Take 1 tablet by mouth daily    Arcelia Dubois MD   Magnesium 500 MG CAPS Take by mouth every evening 200 bid    ProviderArcelia MD       Current medications:    Current Facility-Administered Medications   Medication Dose Route Frequency Provider Last Rate Last Admin    0.9 % sodium chloride infusion   IntraVENous Continuous Roseline Arenas MD           Allergies:    Allergies   Allergen Reactions    Azmacort [Triamcinolone] Swelling    Atovaquone Rash     Other reaction(s): Rash    Doxycycline Rash     Other reaction(s): Rash       Problem List:    Patient Active Problem List   Diagnosis Code    Obstructive sleep apnea syndrome G47.33    White coat syndrome with diagnosis of hypertension I10    Pure

## 2024-12-05 ENCOUNTER — OFFICE VISIT (OUTPATIENT)
Age: 79
End: 2024-12-05
Payer: MEDICARE

## 2024-12-05 VITALS
HEIGHT: 60 IN | OXYGEN SATURATION: 97 % | TEMPERATURE: 98 F | WEIGHT: 122 LBS | SYSTOLIC BLOOD PRESSURE: 146 MMHG | HEART RATE: 68 BPM | RESPIRATION RATE: 16 BRPM | BODY MASS INDEX: 23.95 KG/M2 | DIASTOLIC BLOOD PRESSURE: 65 MMHG

## 2024-12-05 DIAGNOSIS — Z85.42 HISTORY OF ENDOMETRIAL CANCER: ICD-10-CM

## 2024-12-05 DIAGNOSIS — M54.41 CHRONIC MIDLINE LOW BACK PAIN WITH BILATERAL SCIATICA: ICD-10-CM

## 2024-12-05 DIAGNOSIS — G89.29 CHRONIC MIDLINE LOW BACK PAIN WITH BILATERAL SCIATICA: ICD-10-CM

## 2024-12-05 DIAGNOSIS — M54.16 LUMBAR RADICULOPATHY: ICD-10-CM

## 2024-12-05 DIAGNOSIS — M48.062 SPINAL STENOSIS OF LUMBAR REGION WITH NEUROGENIC CLAUDICATION: Primary | ICD-10-CM

## 2024-12-05 DIAGNOSIS — M46.1 SACROILIITIS (HCC): ICD-10-CM

## 2024-12-05 DIAGNOSIS — G89.4 CHRONIC PAIN SYNDROME: ICD-10-CM

## 2024-12-05 DIAGNOSIS — M54.42 CHRONIC MIDLINE LOW BACK PAIN WITH BILATERAL SCIATICA: ICD-10-CM

## 2024-12-05 PROCEDURE — 99213 OFFICE O/P EST LOW 20 MIN: CPT | Performed by: STUDENT IN AN ORGANIZED HEALTH CARE EDUCATION/TRAINING PROGRAM

## 2024-12-05 PROCEDURE — 1160F RVW MEDS BY RX/DR IN RCRD: CPT | Performed by: STUDENT IN AN ORGANIZED HEALTH CARE EDUCATION/TRAINING PROGRAM

## 2024-12-05 PROCEDURE — 1123F ACP DISCUSS/DSCN MKR DOCD: CPT | Performed by: STUDENT IN AN ORGANIZED HEALTH CARE EDUCATION/TRAINING PROGRAM

## 2024-12-05 PROCEDURE — 3077F SYST BP >= 140 MM HG: CPT | Performed by: STUDENT IN AN ORGANIZED HEALTH CARE EDUCATION/TRAINING PROGRAM

## 2024-12-05 PROCEDURE — 1159F MED LIST DOCD IN RCRD: CPT | Performed by: STUDENT IN AN ORGANIZED HEALTH CARE EDUCATION/TRAINING PROGRAM

## 2024-12-05 PROCEDURE — 3078F DIAST BP <80 MM HG: CPT | Performed by: STUDENT IN AN ORGANIZED HEALTH CARE EDUCATION/TRAINING PROGRAM

## 2024-12-05 NOTE — PROGRESS NOTES
Roopa Couch presents to the [facility] on 12/5/2024. Roopa is complaining of pain in her low back. The pain is intermittent. The pain is described as aching. Pain is rated on her best day at a 2, on her worst day at a 8, and on average at a 5 on the VAS scale. She took her last dose of Tylenol yesterday.     Any procedures since your last visit: No    Pacemaker or defibrillator: No managed by na.    She is not on NSAIDS and is  on anticoagulation medications to include ASA and is managed by Dr. Wade.     Do you want someone present when the provider examines you? No    Medication Contract and Consent for Opioid Use Documents Filed        No documents found                    There were no vitals taken for this visit.     No LMP recorded. Patient has had a hysterectomy.

## 2024-12-06 NOTE — PROGRESS NOTES
[facility] - Pain Medicine  [address] [place] [address]    Pain Medicine Follow Up Note      Roopa Couch     Date of Visit:  12/5/2024    CC:  Patient presents for follow up   Chief Complaint   Patient presents with    Follow-up     Follow up       HPI:    Pain is worse.  Medication side effects:none.   Recent interventional procedures:no  Blood Thinners/Anticoagulation:  no  Herbal Supplements: no  Pertinent Allergies: no  Diabetic: no    Previous Plan:  PT - HEP - doing great  Consider repeat L SIJ, or SAMUEL, Hold ASA/ibup    Interval Changes:  Now having acute midline low back pain and some pain in both SI joints  Was recently told she may be at increased risk of having fractures due to bone density  Is currently being evaluated at [facility] for therapeutic options      Procedures: yes,    3/7/2023 - L4/5 SAMUEL - 80% relief    6/6/2023 - Left SIJ - 90% relief  for 3+ months       Imaging: New: no     XRAY:    MRI:  MRI LUMBAR SPINE WO CONTRAST 11/11/2022    Narrative  EXAMINATION:  MRI OF THE LUMBAR SPINE WITHOUT CONTRAST, 11/11/2022 1:45 pm    TECHNIQUE:  Multiplanar multisequence MRI of the lumbar spine was performed without the  administration of intravenous contrast.    COMPARISON:  None.    HISTORY:  ORDERING SYSTEM PROVIDED HISTORY: Chronic bilateral low back pain without  sciatica  TECHNOLOGIST PROVIDED HISTORY:  Reason for exam:->low back pain failed 12 weeks PT. OMT, heat. OTC meds  tylenol and ibuprofen  What is the sedation requirement?->None  What reading provider will be dictating this exam?->CRC    FINDINGS:  BONES/ALIGNMENT:  No fracture or joint dislocation.  In the L5 vertebral  body, there is an area of signal abnormality, which measures approximately  1.8 x 1.3 cm and is predominantly T1 hypointense and T2 hyperintense.  The  remainder of the marrow signal is unremarkable.    SPINAL CORD: The conus terminates normally.    SOFT TISSUES: No paraspinal mass

## 2025-01-03 ENCOUNTER — OFFICE VISIT (OUTPATIENT)
Dept: PAIN MANAGEMENT | Age: 80
End: 2025-01-03
Payer: MEDICARE

## 2025-01-03 VITALS
RESPIRATION RATE: 16 BRPM | HEIGHT: 60 IN | TEMPERATURE: 97.9 F | BODY MASS INDEX: 23.95 KG/M2 | HEART RATE: 69 BPM | WEIGHT: 122 LBS | OXYGEN SATURATION: 94 % | SYSTOLIC BLOOD PRESSURE: 152 MMHG | DIASTOLIC BLOOD PRESSURE: 71 MMHG

## 2025-01-03 DIAGNOSIS — M54.42 CHRONIC MIDLINE LOW BACK PAIN WITH BILATERAL SCIATICA: ICD-10-CM

## 2025-01-03 DIAGNOSIS — M48.062 SPINAL STENOSIS OF LUMBAR REGION WITH NEUROGENIC CLAUDICATION: ICD-10-CM

## 2025-01-03 DIAGNOSIS — G89.29 CHRONIC MIDLINE LOW BACK PAIN WITH BILATERAL SCIATICA: ICD-10-CM

## 2025-01-03 DIAGNOSIS — M54.16 LUMBAR RADICULOPATHY: ICD-10-CM

## 2025-01-03 DIAGNOSIS — M46.1 SACROILIITIS (HCC): Primary | ICD-10-CM

## 2025-01-03 DIAGNOSIS — M54.41 CHRONIC MIDLINE LOW BACK PAIN WITH BILATERAL SCIATICA: ICD-10-CM

## 2025-01-03 PROCEDURE — 99214 OFFICE O/P EST MOD 30 MIN: CPT | Performed by: STUDENT IN AN ORGANIZED HEALTH CARE EDUCATION/TRAINING PROGRAM

## 2025-01-03 PROCEDURE — 1123F ACP DISCUSS/DSCN MKR DOCD: CPT | Performed by: STUDENT IN AN ORGANIZED HEALTH CARE EDUCATION/TRAINING PROGRAM

## 2025-01-03 PROCEDURE — 3078F DIAST BP <80 MM HG: CPT | Performed by: STUDENT IN AN ORGANIZED HEALTH CARE EDUCATION/TRAINING PROGRAM

## 2025-01-03 PROCEDURE — 3077F SYST BP >= 140 MM HG: CPT | Performed by: STUDENT IN AN ORGANIZED HEALTH CARE EDUCATION/TRAINING PROGRAM

## 2025-01-03 PROCEDURE — 1160F RVW MEDS BY RX/DR IN RCRD: CPT | Performed by: STUDENT IN AN ORGANIZED HEALTH CARE EDUCATION/TRAINING PROGRAM

## 2025-01-03 PROCEDURE — 1159F MED LIST DOCD IN RCRD: CPT | Performed by: STUDENT IN AN ORGANIZED HEALTH CARE EDUCATION/TRAINING PROGRAM

## 2025-01-03 RX ORDER — SODIUM CHLORIDE 9 MG/ML
INJECTION, SOLUTION INTRAVENOUS PRN
OUTPATIENT
Start: 2025-01-03

## 2025-01-03 RX ORDER — SODIUM CHLORIDE 0.9 % (FLUSH) 0.9 %
5-40 SYRINGE (ML) INJECTION EVERY 12 HOURS SCHEDULED
OUTPATIENT
Start: 2025-01-03

## 2025-01-03 RX ORDER — SODIUM CHLORIDE 0.9 % (FLUSH) 0.9 %
5-40 SYRINGE (ML) INJECTION PRN
OUTPATIENT
Start: 2025-01-03

## 2025-01-03 NOTE — PROGRESS NOTES
Roopa Couch presents to the [facility] on 1/3/2025. Roopa is complaining of pain low back. The pain is persistent. The pain is described as aching and dull. Pain is rated on her best day at a 2, on her worst day at a 7, and on average at a 5 on the VAS scale.    Any procedures since your last visit: No,    Pacemaker or defibrillator: No    She is  on NSAIDS and is  on anticoagulation medications to include ASA and is managed by self.     Do you want someone present when the provider examines you? No    Medication Contract and Consent for Opioid Use Documents Filed        No documents found                    BP (!) 152/71   Pulse 69   Temp 97.9 °F (36.6 °C) (Infrared)   Resp 16   Ht 1.524 m (5')   Wt 55.3 kg (122 lb)   SpO2 94%   BMI 23.83 kg/m²      No LMP recorded. Patient has had a hysterectomy.

## 2025-01-04 NOTE — PROGRESS NOTES
[facility] - Pain Medicine  [address] [place] [address]    Pain Medicine Follow Up Note      Roopa Couch     Date of Visit:  1/3/2025    CC:  Patient presents for follow up   Chief Complaint   Patient presents with    Follow-up     Lower back       HPI:    Pain is unchanged.  Medication side effects:none.   Recent interventional procedures:no  Blood Thinners/Anticoagulation:  no  Herbal Supplements: no  Pertinent Allergies: no  Diabetic: no    Previous Plan:  PT - HEP - doing great  Chiropractic - did not move forward with osteostrong  Consider repeat SIJ, or SAMUEL  XR L spine - done    Interval Changes:  Continues to have pain in low back/buttock near both SI joints  Wishes to consider interventions      Procedures: yes,    3/7/2023 - L4/5 SAMUEL - 80% relief    6/6/2023 - Left SIJ - 90% relief  for 3+ months       Imaging: New: no     XRAY:  Xray 12/2024  Overall moderate degenerative changes mostly mid through lower lumbar spine,  facet joints probably worse than discs.  Lumbar dextroscoliosis.  No  traumatic malalignment or acute fracture is identified.   Mild degenerative  spondylolisthesis not excluded within limitations of the rotation and  scoliosis.     IMPRESSION:  Scoliosis and degenerative changes.    MRI:  MRI LUMBAR SPINE WO CONTRAST 11/11/2022    Narrative  EXAMINATION:  MRI OF THE LUMBAR SPINE WITHOUT CONTRAST, 11/11/2022 1:45 pm    TECHNIQUE:  Multiplanar multisequence MRI of the lumbar spine was performed without the  administration of intravenous contrast.    COMPARISON:  None.    HISTORY:  ORDERING SYSTEM PROVIDED HISTORY: Chronic bilateral low back pain without  sciatica  TECHNOLOGIST PROVIDED HISTORY:  Reason for exam:->low back pain failed 12 weeks PT. OMT, heat. OTC meds  tylenol and ibuprofen  What is the sedation requirement?->None  What reading provider will be dictating this exam?->CRC    FINDINGS:  BONES/ALIGNMENT:  No fracture or joint dislocation.  In the L5

## 2025-01-16 ENCOUNTER — PATIENT MESSAGE (OUTPATIENT)
Dept: FAMILY MEDICINE CLINIC | Age: 80
End: 2025-01-16

## 2025-01-16 DIAGNOSIS — R73.09 ELEVATED HEMOGLOBIN A1C: ICD-10-CM

## 2025-01-16 DIAGNOSIS — R73.01 ELEVATED FASTING BLOOD SUGAR: Primary | ICD-10-CM

## 2025-01-16 DIAGNOSIS — E78.1 PURE HYPERTRIGLYCERIDEMIA: ICD-10-CM

## 2025-01-17 NOTE — TELEPHONE ENCOUNTER
1. Elevated fasting blood sugar  -     Insulin, total; Future  -     Hemoglobin A1C; Future  2. Elevated hemoglobin A1c  -     Insulin, total; Future  -     Hemoglobin A1C; Future  3. Pure hypertriglyceridemia  -     Insulin, total; Future  -     Hemoglobin A1C; Future    Labs ordered per patient preference

## 2025-01-21 ENCOUNTER — HOSPITAL ENCOUNTER (OUTPATIENT)
Age: 80
Discharge: HOME | End: 2025-01-21
Payer: MEDICARE

## 2025-01-21 DIAGNOSIS — E55.9 VITAMIN D DEFICIENCY: ICD-10-CM

## 2025-01-21 DIAGNOSIS — E78.1 PURE HYPERTRIGLYCERIDEMIA: ICD-10-CM

## 2025-01-21 DIAGNOSIS — I10 PRIMARY HYPERTENSION: ICD-10-CM

## 2025-01-21 DIAGNOSIS — R73.09 ELEVATED HEMOGLOBIN A1C: ICD-10-CM

## 2025-01-21 DIAGNOSIS — R73.01 ELEVATED FASTING BLOOD SUGAR: ICD-10-CM

## 2025-01-21 LAB
25(OH)D3 SERPL-MCNC: 72.5 NG/ML (ref 30–100)
ALBUMIN SERPL-MCNC: 4.5 G/DL (ref 3.5–5.2)
ALP SERPL-CCNC: 89 U/L (ref 35–104)
ALT SERPL-CCNC: 16 U/L (ref 0–32)
ANION GAP SERPL CALCULATED.3IONS-SCNC: 10 MMOL/L (ref 7–16)
AST SERPL-CCNC: 22 U/L (ref 0–31)
BASOPHILS # BLD: 0.03 K/UL (ref 0–0.2)
BASOPHILS NFR BLD: 1 % (ref 0–2)
BILIRUB SERPL-MCNC: 0.3 MG/DL (ref 0–1.2)
BUN SERPL-MCNC: 14 MG/DL (ref 6–23)
CALCIUM SERPL-MCNC: 9.7 MG/DL (ref 8.6–10.2)
CHLORIDE SERPL-SCNC: 97 MMOL/L (ref 98–107)
CHOLEST SERPL-MCNC: 161 MG/DL
CO2 SERPL-SCNC: 28 MMOL/L (ref 22–29)
CREAT SERPL-MCNC: 0.8 MG/DL (ref 0.5–1)
EOSINOPHIL # BLD: 0.3 K/UL (ref 0.05–0.5)
EOSINOPHILS RELATIVE PERCENT: 5 % (ref 0–6)
ERYTHROCYTE [DISTWIDTH] IN BLOOD BY AUTOMATED COUNT: 12.5 % (ref 11.5–15)
GFR, ESTIMATED: 74 ML/MIN/1.73M2
GLUCOSE SERPL-MCNC: 95 MG/DL (ref 74–99)
HBA1C MFR BLD: 5.7 % (ref 4–5.6)
HCT VFR BLD AUTO: 34.4 % (ref 34–48)
HDLC SERPL-MCNC: 71 MG/DL
HGB BLD-MCNC: 11.8 G/DL (ref 11.5–15.5)
IMM GRANULOCYTES # BLD AUTO: <0.03 K/UL (ref 0–0.58)
IMM GRANULOCYTES NFR BLD: 0 % (ref 0–5)
LDLC SERPL CALC-MCNC: 72 MG/DL
LYMPHOCYTES NFR BLD: 1.4 K/UL (ref 1.5–4)
LYMPHOCYTES RELATIVE PERCENT: 25 % (ref 20–42)
MCH RBC QN AUTO: 31.1 PG (ref 26–35)
MCHC RBC AUTO-ENTMCNC: 34.3 G/DL (ref 32–34.5)
MCV RBC AUTO: 90.5 FL (ref 80–99.9)
MONOCYTES NFR BLD: 0.75 K/UL (ref 0.1–0.95)
MONOCYTES NFR BLD: 13 % (ref 2–12)
NEUTROPHILS NFR BLD: 56 % (ref 43–80)
NEUTS SEG NFR BLD: 3.15 K/UL (ref 1.8–7.3)
PLATELET # BLD AUTO: 263 K/UL (ref 130–450)
PMV BLD AUTO: 9.5 FL (ref 7–12)
POTASSIUM SERPL-SCNC: 4.3 MMOL/L (ref 3.5–5)
PROT SERPL-MCNC: 6.8 G/DL (ref 6.4–8.3)
RBC # BLD AUTO: 3.8 M/UL (ref 3.5–5.5)
SODIUM SERPL-SCNC: 135 MMOL/L (ref 132–146)
TRIGL SERPL-MCNC: 91 MG/DL
TSH SERPL DL<=0.05 MIU/L-ACNC: 1.01 UIU/ML (ref 0.27–4.2)
VLDLC SERPL CALC-MCNC: 18 MG/DL
WBC OTHER # BLD: 5.6 K/UL (ref 4.5–11.5)

## 2025-01-21 PROCEDURE — 85025 COMPLETE CBC W/AUTO DIFF WBC: CPT

## 2025-01-21 PROCEDURE — 84443 ASSAY THYROID STIM HORMONE: CPT

## 2025-01-21 PROCEDURE — 82306 VITAMIN D 25 HYDROXY: CPT

## 2025-01-21 PROCEDURE — 36415 COLL VENOUS BLD VENIPUNCTURE: CPT

## 2025-01-21 PROCEDURE — 83525 ASSAY OF INSULIN: CPT

## 2025-01-21 PROCEDURE — 80053 COMPREHEN METABOLIC PANEL: CPT

## 2025-01-21 PROCEDURE — 83036 HEMOGLOBIN GLYCOSYLATED A1C: CPT

## 2025-01-21 PROCEDURE — 80061 LIPID PANEL: CPT

## 2025-01-23 ENCOUNTER — PREP FOR PROCEDURE (OUTPATIENT)
Dept: PAIN MANAGEMENT | Age: 80
End: 2025-01-23

## 2025-01-23 ENCOUNTER — TELEPHONE (OUTPATIENT)
Dept: PAIN MANAGEMENT | Age: 80
End: 2025-01-23

## 2025-01-23 LAB
INSULIN COMMENT: NORMAL
INSULIN REFERENCE RANGE:: NORMAL
INSULIN: 5.5 MU/L

## 2025-01-23 NOTE — TELEPHONE ENCOUNTER
Call to Roopa Couch that procedure was scheduled for 01/31/2025 and that [facility] should call her a few days before for the pre op call and between 2:00 PM and 4:00 PM  the business day before with the arrival time. Instructed Roopa to hold ibuprofen for 24 hours, Celebrex, Mobic, and naprosyn for 4 days and any aspirin containing products, CoQ 10, or fish oil for 7 days. Instructed to call office back if any questions. Roopa verbalized understanding.    **Please give one 5mg Valium PO to patient in preop per Dr. Rosario orders**    Electronically signed by Marlen Mendez RN on 1/23/2025 at 4:38 PM

## 2025-01-24 SDOH — ECONOMIC STABILITY: FOOD INSECURITY: WITHIN THE PAST 12 MONTHS, THE FOOD YOU BOUGHT JUST DIDN'T LAST AND YOU DIDN'T HAVE MONEY TO GET MORE.: NEVER TRUE

## 2025-01-24 SDOH — ECONOMIC STABILITY: FOOD INSECURITY: WITHIN THE PAST 12 MONTHS, YOU WORRIED THAT YOUR FOOD WOULD RUN OUT BEFORE YOU GOT MONEY TO BUY MORE.: NEVER TRUE

## 2025-01-24 SDOH — ECONOMIC STABILITY: INCOME INSECURITY: IN THE LAST 12 MONTHS, WAS THERE A TIME WHEN YOU WERE NOT ABLE TO PAY THE MORTGAGE OR RENT ON TIME?: NO

## 2025-01-24 SDOH — HEALTH STABILITY: PHYSICAL HEALTH: ON AVERAGE, HOW MANY DAYS PER WEEK DO YOU ENGAGE IN MODERATE TO STRENUOUS EXERCISE (LIKE A BRISK WALK)?: 0 DAYS

## 2025-01-24 ASSESSMENT — PATIENT HEALTH QUESTIONNAIRE - PHQ9
1. LITTLE INTEREST OR PLEASURE IN DOING THINGS: NOT AT ALL
SUM OF ALL RESPONSES TO PHQ QUESTIONS 1-9: 0
2. FEELING DOWN, DEPRESSED OR HOPELESS: NOT AT ALL
SUM OF ALL RESPONSES TO PHQ9 QUESTIONS 1 & 2: 0

## 2025-01-24 ASSESSMENT — LIFESTYLE VARIABLES
HOW OFTEN DO YOU HAVE SIX OR MORE DRINKS ON ONE OCCASION: 1
HOW MANY STANDARD DRINKS CONTAINING ALCOHOL DO YOU HAVE ON A TYPICAL DAY: 1
HOW OFTEN DO YOU HAVE A DRINK CONTAINING ALCOHOL: MONTHLY OR LESS
HOW OFTEN DO YOU HAVE A DRINK CONTAINING ALCOHOL: 2
HOW MANY STANDARD DRINKS CONTAINING ALCOHOL DO YOU HAVE ON A TYPICAL DAY: 1 OR 2

## 2025-01-27 ENCOUNTER — OFFICE VISIT (OUTPATIENT)
Dept: FAMILY MEDICINE CLINIC | Age: 80
End: 2025-01-27

## 2025-01-27 VITALS
HEART RATE: 67 BPM | SYSTOLIC BLOOD PRESSURE: 160 MMHG | RESPIRATION RATE: 17 BRPM | OXYGEN SATURATION: 98 % | TEMPERATURE: 96.4 F | WEIGHT: 124.5 LBS | DIASTOLIC BLOOD PRESSURE: 68 MMHG | BODY MASS INDEX: 24.44 KG/M2 | HEIGHT: 60 IN

## 2025-01-27 DIAGNOSIS — M25.552 BILATERAL HIP PAIN: ICD-10-CM

## 2025-01-27 DIAGNOSIS — Z71.89 ACP (ADVANCE CARE PLANNING): ICD-10-CM

## 2025-01-27 DIAGNOSIS — Z86.0100 HX OF COLONIC POLYPS: ICD-10-CM

## 2025-01-27 DIAGNOSIS — M25.551 BILATERAL HIP PAIN: ICD-10-CM

## 2025-01-27 DIAGNOSIS — I10 PRIMARY HYPERTENSION: ICD-10-CM

## 2025-01-27 DIAGNOSIS — I10 WHITE COAT SYNDROME WITH DIAGNOSIS OF HYPERTENSION: ICD-10-CM

## 2025-01-27 DIAGNOSIS — Z00.00 MEDICARE ANNUAL WELLNESS VISIT, SUBSEQUENT: Primary | ICD-10-CM

## 2025-01-27 DIAGNOSIS — M54.17 LUMBOSACRAL RADICULOPATHY: ICD-10-CM

## 2025-01-27 DIAGNOSIS — M46.1 SACROILIITIS (HCC): ICD-10-CM

## 2025-01-27 DIAGNOSIS — G47.33 OBSTRUCTIVE SLEEP APNEA SYNDROME: ICD-10-CM

## 2025-01-27 DIAGNOSIS — R93.7 ABNORMAL MAGNETIC RESONANCE IMAGING OF LUMBAR SPINE: ICD-10-CM

## 2025-01-27 RX ORDER — VALSARTAN 40 MG/1
40 TABLET ORAL DAILY
Qty: 30 TABLET | Refills: 1 | Status: SHIPPED | OUTPATIENT
Start: 2025-01-27

## 2025-01-27 NOTE — PATIENT INSTRUCTIONS
stretches, and gentle yoga.  Aim for at least twice a week, preferably after your muscles are warmed up from other activities.  It can help you function better in daily life.  Balancing.  This helps you stay coordinated and have good posture.  Includes heel-to-toe walking, madison chi, and certain types of yoga.  Aim for at least 3 days a week.  It can reduce your risk of falling.  Even if you have a hard time meeting the recommendations, it's better to be more active than less active. All activity done in each category counts toward your weekly total. You'd be surprised how daily things like carrying groceries, keeping up with grandchildren, and taking the stairs can add up.  What keeps you from being active?  If you've had a hard time being more active, you're not alone. Maybe you remember being able to do more. Or maybe you've never thought of yourself as being active. It's frustrating when you can't do the things you want. Being more active can help. What's holding you back?  Getting started.  Have a goal, but break it into easy tasks. Small steps build into big accomplishments.  Staying motivated.  If you feel like skipping your activity, remember your goal. Maybe you want to move better and stay independent. Every activity gets you one step closer.  Not feeling your best.  Start with 5 minutes of an activity you enjoy. Prove to yourself you can do it. As you get comfortable, increase your time.  You may not be where you want to be. But you're in the process of getting there. Everyone starts somewhere.  How can you find safe ways to stay active?  Talk with your doctor about any physical challenges you're facing. Make a plan with your doctor if you have a health problem or aren't sure how to get started with activity.  If you're already active, ask your doctor if there is anything you should change to stay safe as your body and health change.  If you tend to feel dizzy after you take medicine, avoid activity at that

## 2025-01-27 NOTE — PROGRESS NOTES
Roopa Couch (:  1945) is a 79 y.o. female, Here for AWV, This is separate note for diagnosis management     Assessment & Plan   ASSESSMENT/PLAN  Patient was also seen today for annual well visit, please also see that note  Medicare annual wellness visit, subsequent  See separate note  Greater than 3 labs reviewed  White coat syndrome with diagnosis of hypertension  Whitecoat plus hypertension at home, lightheadedness with lisinopril, leg swelling with amlodipine, will try valsartan, low-dose, can increase to twice a day if needed  Obstructive sleep apnea syndrome  Continue  Primary hypertension  Chronic, not well-controlled, will start new medication per below  -     valsartan (DIOVAN) 40 MG tablet; Take 1 tablet by mouth daily, Disp-30 tablet, R-1Normal  Hx of colonic polyps  Did have updated colonoscopy, recheck in 5 years  ACP (advance care planning)  She does see stickiness, recommend she bring them in so we can scan them  Bilateral hip pain  She does have chronic burning pain, bilateral lateral hips, hip versus back, is following with pain management and plans for injection, also with chiropractor, recommended she try to increase her activity, will get hip x-ray, continue with current healthy lifestyle, anti-inflammatory nutrition  -     XR HIP BILATERAL W AP PELVIS (2 VIEWS); Future  Sacroiliitis (HCC)  Following with chiro and pain management  Lumbosacral radiculopathy  See above, unclear if this is causing the hip pain or if it is related to the hip  Abnormal magnetic resonance imaging of lumbar spine  See above    She also has osteoporosis managed by gynecology, did provide her with information on lifestyle changes and after visit summary, vitamin D and calcium both look good on labs today  Return in 6 months (on 2025) for Medicare Annual Wellness Visit in 1 year, Follow up chronic disease 6 m .           Subjective   SUBJECTIVE/OBJECTIVE:  HPI  Also see annual well visit for

## 2025-01-28 NOTE — PROGRESS NOTES
[facility]  INSTRUCTIONS  ...........................................................................................................................................     [x] Parking the day of Surgery is located in the Main Entrance lot.  Upon entering the door, make immediate right into the surgery reception room    [x]  Bring photo ID and insurance card     [x] NPO after midnight    [x]  Wear loose comfortable clothing    [x]  Please follow instructions for medications as given per Dr's office    [x] You can expect a call the business day prior to procedure to notify you of your arrival time     [x] Please arrange for

## 2025-01-31 ENCOUNTER — HOSPITAL ENCOUNTER (OUTPATIENT)
Dept: GENERAL RADIOLOGY | Age: 80
End: 2025-01-31
Attending: STUDENT IN AN ORGANIZED HEALTH CARE EDUCATION/TRAINING PROGRAM
Payer: MEDICARE

## 2025-01-31 ENCOUNTER — HOSPITAL ENCOUNTER (OUTPATIENT)
Age: 80
Setting detail: OUTPATIENT SURGERY
Discharge: HOME | End: 2025-01-31
Attending: STUDENT IN AN ORGANIZED HEALTH CARE EDUCATION/TRAINING PROGRAM | Admitting: STUDENT IN AN ORGANIZED HEALTH CARE EDUCATION/TRAINING PROGRAM
Payer: MEDICARE

## 2025-01-31 VITALS
HEART RATE: 69 BPM | RESPIRATION RATE: 15 BRPM | TEMPERATURE: 98 F | WEIGHT: 120 LBS | OXYGEN SATURATION: 96 % | DIASTOLIC BLOOD PRESSURE: 69 MMHG | SYSTOLIC BLOOD PRESSURE: 169 MMHG | HEIGHT: 60 IN | BODY MASS INDEX: 23.56 KG/M2

## 2025-01-31 DIAGNOSIS — R52 PAIN MANAGEMENT: ICD-10-CM

## 2025-01-31 PROCEDURE — 7100000010 HC PHASE II RECOVERY - FIRST 15 MIN: Performed by: STUDENT IN AN ORGANIZED HEALTH CARE EDUCATION/TRAINING PROGRAM

## 2025-01-31 PROCEDURE — 6360000002 HC RX W HCPCS: Performed by: STUDENT IN AN ORGANIZED HEALTH CARE EDUCATION/TRAINING PROGRAM

## 2025-01-31 PROCEDURE — 7100000011 HC PHASE II RECOVERY - ADDTL 15 MIN: Performed by: STUDENT IN AN ORGANIZED HEALTH CARE EDUCATION/TRAINING PROGRAM

## 2025-01-31 PROCEDURE — 6360000004 HC RX CONTRAST MEDICATION: Performed by: STUDENT IN AN ORGANIZED HEALTH CARE EDUCATION/TRAINING PROGRAM

## 2025-01-31 PROCEDURE — 2709999900 HC NON-CHARGEABLE SUPPLY: Performed by: STUDENT IN AN ORGANIZED HEALTH CARE EDUCATION/TRAINING PROGRAM

## 2025-01-31 PROCEDURE — 3600000002 HC SURGERY LEVEL 2 BASE: Performed by: STUDENT IN AN ORGANIZED HEALTH CARE EDUCATION/TRAINING PROGRAM

## 2025-01-31 PROCEDURE — 3600000012 HC SURGERY LEVEL 2 ADDTL 15MIN: Performed by: STUDENT IN AN ORGANIZED HEALTH CARE EDUCATION/TRAINING PROGRAM

## 2025-01-31 PROCEDURE — 6370000000 HC RX 637 (ALT 250 FOR IP)

## 2025-01-31 RX ORDER — SODIUM CHLORIDE 0.9 % (FLUSH) 0.9 %
5-40 SYRINGE (ML) INJECTION PRN
Status: DISCONTINUED | OUTPATIENT
Start: 2025-01-31 | End: 2025-01-31 | Stop reason: HOSPADM

## 2025-01-31 RX ORDER — DIAZEPAM 5 MG/1
TABLET ORAL
Status: COMPLETED
Start: 2025-01-31 | End: 2025-01-31

## 2025-01-31 RX ORDER — IOPAMIDOL 612 MG/ML
INJECTION, SOLUTION INTRATHECAL PRN
Status: DISCONTINUED | OUTPATIENT
Start: 2025-01-31 | End: 2025-01-31 | Stop reason: ALTCHOICE

## 2025-01-31 RX ORDER — SODIUM CHLORIDE 0.9 % (FLUSH) 0.9 %
5-40 SYRINGE (ML) INJECTION EVERY 12 HOURS SCHEDULED
Status: DISCONTINUED | OUTPATIENT
Start: 2025-01-31 | End: 2025-01-31 | Stop reason: HOSPADM

## 2025-01-31 RX ORDER — BUPIVACAINE HYDROCHLORIDE 2.5 MG/ML
INJECTION, SOLUTION EPIDURAL; INFILTRATION; INTRACAUDAL PRN
Status: DISCONTINUED | OUTPATIENT
Start: 2025-01-31 | End: 2025-01-31 | Stop reason: ALTCHOICE

## 2025-01-31 RX ORDER — METHYLPREDNISOLONE ACETATE 40 MG/ML
INJECTION, SUSPENSION INTRA-ARTICULAR; INTRALESIONAL; INTRAMUSCULAR; SOFT TISSUE PRN
Status: DISCONTINUED | OUTPATIENT
Start: 2025-01-31 | End: 2025-01-31 | Stop reason: ALTCHOICE

## 2025-01-31 RX ORDER — SODIUM CHLORIDE 9 MG/ML
INJECTION, SOLUTION INTRAVENOUS PRN
Status: DISCONTINUED | OUTPATIENT
Start: 2025-01-31 | End: 2025-01-31 | Stop reason: HOSPADM

## 2025-01-31 RX ORDER — LIDOCAINE HYDROCHLORIDE 5 MG/ML
INJECTION, SOLUTION INFILTRATION; INTRAVENOUS PRN
Status: DISCONTINUED | OUTPATIENT
Start: 2025-01-31 | End: 2025-01-31 | Stop reason: ALTCHOICE

## 2025-01-31 RX ORDER — DIAZEPAM 5 MG/1
5 TABLET ORAL
Status: DISCONTINUED | OUTPATIENT
Start: 2025-01-31 | End: 2025-01-31 | Stop reason: HOSPADM

## 2025-01-31 RX ADMIN — DIAZEPAM 5 MG: 5 TABLET ORAL at 10:31

## 2025-01-31 ASSESSMENT — PAIN - FUNCTIONAL ASSESSMENT
PAIN_FUNCTIONAL_ASSESSMENT: 0-10

## 2025-01-31 ASSESSMENT — PAIN DESCRIPTION - DESCRIPTORS: DESCRIPTORS: DISCOMFORT

## 2025-01-31 NOTE — H&P
[facility]  Pain Medicine  [address] [place] [address]    Procedure History & Physical      Roopa Rhoadesless     HPI:    Patient  is here for   LBP, B/L SIJ pain for B/L SIJ injection   Labs/imaging studies reviewed   All question and concerns addressed including R/B/A associated with the procedure    Past Medical History:   Diagnosis Date    Arthritis     Babesiosis 2009    tick disease; treated    Back pain     Carotid artery stenosis     Endometrial cancer (HCC) 11/21 2019 approx    new dx    Hearing loss     bilat hearing aids    History of blood transfusion     Hyperlipidemia     Hypertension     GENNY on CPAP        Past Surgical History:   Procedure Laterality Date    BUNIONECTOMY  2006 2007    bilateral    CARPAL TUNNEL RELEASE Bilateral     COLONOSCOPY      COLONOSCOPY N/A 12/04/2019    COLONOSCOPY WITH BIOPSY performed by Roseline Arenas MD at [facility] ENDOSCOPY    COLONOSCOPY N/A 11/20/2024    COLONOSCOPY DIAGNOSTIC performed by Roseline Arenas MD at [facility] ENDOSCOPY    CYST REMOVAL  2010    right hand    FINGER TRIGGER RELEASE Left 12/18/2018    left ring finger.    HYSTERECTOMY (CERVIX STATUS UNKNOWN)  12/17/2019    [facility]    HYSTERECTOMY, VAGINAL  2019    INJECTION Right 06/2021    Patient had right knee injection    KNEE ARTHROPLASTY  2016    NERVE BLOCK Left 06/06/2023    LEFT SACROILIAC JOINT INJECTION UNDER FLUOROSCOPIC GUIDANCE performed by Leann Guerra MD at [facility] OR    OTHER SURGICAL HISTORY Right 07/03/2015    excision mass right upper arm    PAIN MANAGEMENT PROCEDURE N/A 03/07/2023    LUMBAR EPIDURAL STEROID INJECTION UNDER FLUOROSCOPIC GUIDANCE AT L4-L5 performed by Leann Guerra MD at [facility] OR    PORT SURGERY N/A 02/11/2020    INSERTION OF A POWER PORT performed by Ceasar Baumann MD at [facility] OR    PORT SURGERY      removal    TONSILLECTOMY  1947    TUBAL LIGATION  1980       Prior to Admission medications    Medication Sig Start Date

## 2025-01-31 NOTE — DISCHARGE INSTRUCTIONS
[facility] Pain Management Department  [place] [phone]  Dr. Rafaela Murphy   Post-Pain Block/Radiofrequency  Home Going Instructions    1-Go home, rest for the remainder of the day  2-Please do not lift over 20 pounds the day of the injection  3-If you received sedation No: alcohol, driving, operating lawn mowers, plows, tractors or other dangerous equipment until next morning. Do not make important decisions or sign legal documents for 24 hours. You may experience light headedness, dizziness, nausea or sleepiness after sedation. Do not stay alone. A responsible adult must be with you for 24 hours. You could be nauseated from the medications you have received. Your IV site may be sore and bruised.    4-No dietary restrictions     5-Resume all medications the same day, blood thinners to be resumed 24 hours after injection if you were instructed to stop any.    6-Keep the surgical site clean and dry, you may shower the next morning and remove the      dressing.     7- No sitz baths, tub baths or hot tubs/swimming for 24 hours.       8- If you have any pain at the injection site(s), application of an ice pack to the area should be       helpful, 20 minutes on/20 minutes off for next 48 hours.  9- Call [facility] Pain Management immediately at if you develop.  Fever greater than 100.4 F  Have bleeding or drainage from the puncture site  Have progressive Leg/arm numbness and or weakness  Loss of control of bowel and or bladder (wet/soil yourself)  Severe headache with inability to lift head  10-You may return to work the next day

## 2025-02-26 ENCOUNTER — OFFICE VISIT (OUTPATIENT)
Age: 80
End: 2025-02-26
Payer: MEDICARE

## 2025-02-26 VITALS
HEIGHT: 60 IN | RESPIRATION RATE: 16 BRPM | SYSTOLIC BLOOD PRESSURE: 138 MMHG | HEART RATE: 69 BPM | WEIGHT: 120 LBS | DIASTOLIC BLOOD PRESSURE: 62 MMHG | BODY MASS INDEX: 23.56 KG/M2 | TEMPERATURE: 97.9 F | OXYGEN SATURATION: 97 %

## 2025-02-26 DIAGNOSIS — M46.1 SACROILIITIS: ICD-10-CM

## 2025-02-26 DIAGNOSIS — M54.16 LUMBAR RADICULOPATHY: ICD-10-CM

## 2025-02-26 DIAGNOSIS — M48.062 SPINAL STENOSIS OF LUMBAR REGION WITH NEUROGENIC CLAUDICATION: ICD-10-CM

## 2025-02-26 DIAGNOSIS — M47.816 LUMBAR SPONDYLOSIS: Primary | ICD-10-CM

## 2025-02-26 PROCEDURE — 1123F ACP DISCUSS/DSCN MKR DOCD: CPT | Performed by: STUDENT IN AN ORGANIZED HEALTH CARE EDUCATION/TRAINING PROGRAM

## 2025-02-26 PROCEDURE — 1159F MED LIST DOCD IN RCRD: CPT | Performed by: STUDENT IN AN ORGANIZED HEALTH CARE EDUCATION/TRAINING PROGRAM

## 2025-02-26 PROCEDURE — 99213 OFFICE O/P EST LOW 20 MIN: CPT | Performed by: STUDENT IN AN ORGANIZED HEALTH CARE EDUCATION/TRAINING PROGRAM

## 2025-02-26 PROCEDURE — 3075F SYST BP GE 130 - 139MM HG: CPT | Performed by: STUDENT IN AN ORGANIZED HEALTH CARE EDUCATION/TRAINING PROGRAM

## 2025-02-26 PROCEDURE — 3078F DIAST BP <80 MM HG: CPT | Performed by: STUDENT IN AN ORGANIZED HEALTH CARE EDUCATION/TRAINING PROGRAM

## 2025-02-26 PROCEDURE — 1160F RVW MEDS BY RX/DR IN RCRD: CPT | Performed by: STUDENT IN AN ORGANIZED HEALTH CARE EDUCATION/TRAINING PROGRAM

## 2025-02-26 NOTE — PROGRESS NOTES
Roopa Couch presents to the [facility] on 2/26/2025. Roopa is complaining of pain low back. The pain is intermittent. The pain is described as aching. Pain is rated on her best day at a 1, on her worst day at a 6, and on average at a 4 on the VAS scale.     Any procedures since your last visit: Yes,     Pacemaker or defibrillator: No     She is  on NSAIDS and is  on anticoagulation medications to include ASA and is managed by Roseline Wade MD..     Do you want someone present when the provider examines you? No    Medication Contract and Consent for Opioid Use Documents Filed        No documents found                    /62   Pulse 69   Temp 97.9 °F (36.6 °C) (Infrared)   Resp 16   Ht 1.524 m (5')   Wt 54.4 kg (120 lb)   SpO2 97%   BMI 23.44 kg/m²      No LMP recorded. Patient has had a hysterectomy.

## 2025-02-26 NOTE — PROGRESS NOTES
[facility] - Pain Medicine  [address] [place] [address]    Pain Medicine Follow Up Note      Roopa Couch     Date of Visit:  2/26/2025    CC:  Patient presents for follow up   Chief Complaint   Patient presents with    Follow-up     BILATERAL SACROILIAC JOINT INJECTION UNDER FLUOROSCOPIC GUIDANCE       HPI:    Pain is better.  Medication side effects:none.   Recent interventional procedures: B/L SIJ - good   Blood Thinners/Anticoagulation:  no  Herbal Supplements: no  Pertinent Allergies: no  Diabetic: no    Previous Plan:  PT - HEP - doing great  B/L SIJ - done    Interval Changes:  Good relief of LB/buttock pain after SIJ inj  Has some slightly higher pain, near facet joints   Has been more active, trying to do more stretches    Procedures: yes,    3/7/2023 - L4/5 SAMUEL - 80% relief    6/6/2023 - Left SIJ - 90% relief  for 3+ months  1/31/2025- b/L SIJ - 50% releif for 1 month        Imaging: New: no     XRAY:  Xray 12/2024  Overall moderate degenerative changes mostly mid through lower lumbar spine,  facet joints probably worse than discs.  Lumbar dextroscoliosis.  No  traumatic malalignment or acute fracture is identified.   Mild degenerative  spondylolisthesis not excluded within limitations of the rotation and  scoliosis.     IMPRESSION:  Scoliosis and degenerative changes.    MRI:  MRI LUMBAR SPINE WO CONTRAST 11/11/2022    Narrative  EXAMINATION:  MRI OF THE LUMBAR SPINE WITHOUT CONTRAST, 11/11/2022 1:45 pm    TECHNIQUE:  Multiplanar multisequence MRI of the lumbar spine was performed without the  administration of intravenous contrast.    COMPARISON:  None.    HISTORY:  ORDERING SYSTEM PROVIDED HISTORY: Chronic bilateral low back pain without  sciatica  TECHNOLOGIST PROVIDED HISTORY:  Reason for exam:->low back pain failed 12 weeks PT. OMT, heat. OTC meds  tylenol and ibuprofen  What is the sedation requirement?->None  What reading provider will be dictating this

## 2025-04-03 ENCOUNTER — OFFICE VISIT (OUTPATIENT)
Dept: FAMILY MEDICINE CLINIC | Age: 80
End: 2025-04-03

## 2025-04-03 VITALS
SYSTOLIC BLOOD PRESSURE: 153 MMHG | DIASTOLIC BLOOD PRESSURE: 70 MMHG | OXYGEN SATURATION: 97 % | TEMPERATURE: 97 F | HEART RATE: 75 BPM | WEIGHT: 123 LBS | HEIGHT: 60 IN | RESPIRATION RATE: 17 BRPM | BODY MASS INDEX: 24.15 KG/M2

## 2025-04-03 DIAGNOSIS — I10 PRIMARY HYPERTENSION: Primary | ICD-10-CM

## 2025-04-03 RX ORDER — VALSARTAN 40 MG/1
80 TABLET ORAL NIGHTLY
Qty: 180 TABLET | Refills: 0 | Status: SHIPPED | OUTPATIENT
Start: 2025-04-03

## 2025-04-03 ASSESSMENT — ENCOUNTER SYMPTOMS
WHEEZING: 0
COUGH: 0
ABDOMINAL DISTENTION: 0
SHORTNESS OF BREATH: 0
ABDOMINAL PAIN: 0

## 2025-04-03 NOTE — PROGRESS NOTES
Roopa Couch (:  1945) is a 79 y.o. female, established patient follow up , here for evaluation of the following:  Hypertension      Assessment & Plan   ASSESSMENT/PLAN      1. Primary hypertension  Chronic, 40 mg valsartan still with elevated home readings greater than 140 over greater than 85, will increase valsartan, first to 60 mg and then if blood pressure still high to 80 mg, she can also take it twice a day if she prefers, good kidney function at last check, not having any medication side effects  -     valsartan (DIOVAN) 40 MG tablet; Take 2 tablets by mouth nightly, Disp-180 tablet, R-0Normal    Return for has apt in july .         Subjective   SUBJECTIVE/OBJECTIVE:  HPI  Here with concerns of blood pressure medication is not working she is currently using 40 mg of valsartan at night      She did send message by was on vacation, covering PCP requested her to have a visit    Roopa Couch \"Melvina\" to [facility] Clinical Staff (supporting You)         25  1:21 PM  I have been on Valsartan 40 mg daily since 25.   My BP is pretty consistently over 140 systolic.  Often 150.   Diastolic  is  almost  always  in 60’s.    Do you think I should increase the dose to 60 mg and see what that does?  The pills are scored , so easily broken.  I have one refill left.     I look forward to hearing from you.    Thank you.       25  1:34 PM  Aleena Amaya MA routed this conversation to Me  Edin Rojas MD to Roopa Couch \"Melvina\"         25  3:01 PM  Hello.  Please schedule with PCP to discuss in detail.  Take care.     Electronically signed by Edin Rojas MD on 2025 at 3:01 PM    Last read by Roopa Couch \"Melvina\" at 6:19PM on 2025.      Last visit she had had arthritis both hips, did recommend exercises Ortho info      Declined preventative screening identified as care gaps unless ordered through this visit    PHQ2/PHQ9      No data recorded     Past

## 2025-05-20 ENCOUNTER — TELEPHONE (OUTPATIENT)
Dept: ORTHOPEDIC SURGERY | Age: 80
End: 2025-05-20

## 2025-05-20 NOTE — TELEPHONE ENCOUNTER
Patient called wanting to schedule appt with Dr MARCUS Hernández.  She has been under the care of Dr Fairchild.  Patient was instructed to call Dr Fairchild office and have records forwarded to the office.  Instructed once records are received we will contact patient to get appt scheduled for Matt Knees

## 2025-05-28 ENCOUNTER — OFFICE VISIT (OUTPATIENT)
Age: 80
End: 2025-05-28
Payer: MEDICARE

## 2025-05-28 VITALS
DIASTOLIC BLOOD PRESSURE: 70 MMHG | BODY MASS INDEX: 24.15 KG/M2 | OXYGEN SATURATION: 99 % | HEIGHT: 60 IN | SYSTOLIC BLOOD PRESSURE: 174 MMHG | HEART RATE: 64 BPM | TEMPERATURE: 97.7 F | WEIGHT: 123 LBS

## 2025-05-28 DIAGNOSIS — M54.16 LUMBAR RADICULOPATHY: ICD-10-CM

## 2025-05-28 DIAGNOSIS — Z85.42 HISTORY OF ENDOMETRIAL CANCER: ICD-10-CM

## 2025-05-28 DIAGNOSIS — M54.42 CHRONIC MIDLINE LOW BACK PAIN WITH BILATERAL SCIATICA: ICD-10-CM

## 2025-05-28 DIAGNOSIS — M47.816 LUMBAR SPONDYLOSIS: Primary | ICD-10-CM

## 2025-05-28 DIAGNOSIS — M54.41 CHRONIC MIDLINE LOW BACK PAIN WITH BILATERAL SCIATICA: ICD-10-CM

## 2025-05-28 DIAGNOSIS — G89.29 CHRONIC MIDLINE LOW BACK PAIN WITH BILATERAL SCIATICA: ICD-10-CM

## 2025-05-28 DIAGNOSIS — M46.1 SACROILIITIS: ICD-10-CM

## 2025-05-28 DIAGNOSIS — M48.062 SPINAL STENOSIS OF LUMBAR REGION WITH NEUROGENIC CLAUDICATION: ICD-10-CM

## 2025-05-28 PROCEDURE — 3077F SYST BP >= 140 MM HG: CPT | Performed by: STUDENT IN AN ORGANIZED HEALTH CARE EDUCATION/TRAINING PROGRAM

## 2025-05-28 PROCEDURE — 1123F ACP DISCUSS/DSCN MKR DOCD: CPT | Performed by: STUDENT IN AN ORGANIZED HEALTH CARE EDUCATION/TRAINING PROGRAM

## 2025-05-28 PROCEDURE — 1160F RVW MEDS BY RX/DR IN RCRD: CPT | Performed by: STUDENT IN AN ORGANIZED HEALTH CARE EDUCATION/TRAINING PROGRAM

## 2025-05-28 PROCEDURE — 3078F DIAST BP <80 MM HG: CPT | Performed by: STUDENT IN AN ORGANIZED HEALTH CARE EDUCATION/TRAINING PROGRAM

## 2025-05-28 PROCEDURE — 99213 OFFICE O/P EST LOW 20 MIN: CPT | Performed by: STUDENT IN AN ORGANIZED HEALTH CARE EDUCATION/TRAINING PROGRAM

## 2025-05-28 PROCEDURE — 1159F MED LIST DOCD IN RCRD: CPT | Performed by: STUDENT IN AN ORGANIZED HEALTH CARE EDUCATION/TRAINING PROGRAM

## 2025-05-28 NOTE — PROGRESS NOTES
Roopa Couch presents to the [facility] on 5/28/2025. Roopa is complaining of pain lower back and left knee. The pain is intermittent. The pain is described as aching. Pain is rated on her best day at a 2, on her worst day at a 6, and on average at a 4 on the VAS scale. She took her last dose of Motrin and Tylenol 05/26.      Any procedures since your last visit: No    She is  on NSAIDS and  is  on anticoagulation medications to include ASA and is managed by Roseline Wade MD .     Pacemaker or defibrillator: No     Do you want someone present when the provider examines you? No    Medication Contract and Consent for Opioid Use Documents Filed        No documents found                       BP (!) 174/70   Pulse 64   Temp 97.7 °F (36.5 °C)   Ht 1.524 m (5')   Wt 55.8 kg (123 lb)   SpO2 99%   BMI 24.02 kg/m²      Roopa's blood pressure was elevated today. She was instructed to contact his primary care provider as soon as possible.    No LMP recorded. Patient has had a hysterectomy.

## 2025-05-28 NOTE — PROGRESS NOTES
[facility] - Pain Medicine  [address] [place] [address]    Pain Medicine Follow Up Note      Roopa Couch     Date of Visit:  5/28/2025    CC:  Patient presents for follow up   Chief Complaint   Patient presents with    Follow-up     Lower back and left knee pain       HPI:    Pain is unchanged.  Medication side effects:none.   Recent interventional procedures:  no new  Blood Thinners/Anticoagulation:  no  Herbal Supplements: no  Pertinent Allergies: no  Diabetic: no    Previous Plan:  PT - HEP - doing great    Interval Changes:  Good relief of LB/buttock pain   Has been more active, trying to do more stretches  Having some worse Left Knee pain  Seeing Betty   Notices some swelling around knees/calf  Had US per her report, negative for DVT  Last US in system - 10/2024 - neg DVT, + baker's cyst      Procedures: yes,    3/7/2023 - L4/5 SAMUEL - 80% relief    6/6/2023 - Left SIJ - 90% relief  for 3+ months  1/31/2025- b/L SIJ - 50% releif for 1 month        Imaging: New: no     XRAY:  Xray 12/2024  Overall moderate degenerative changes mostly mid through lower lumbar spine,  facet joints probably worse than discs.  Lumbar dextroscoliosis.  No  traumatic malalignment or acute fracture is identified.   Mild degenerative  spondylolisthesis not excluded within limitations of the rotation and  scoliosis.     IMPRESSION:  Scoliosis and degenerative changes.    MRI:  MRI LUMBAR SPINE WO CONTRAST 11/11/2022    Narrative  EXAMINATION:  MRI OF THE LUMBAR SPINE WITHOUT CONTRAST, 11/11/2022 1:45 pm    TECHNIQUE:  Multiplanar multisequence MRI of the lumbar spine was performed without the  administration of intravenous contrast.    COMPARISON:  None.    HISTORY:  ORDERING SYSTEM PROVIDED HISTORY: Chronic bilateral low back pain without  sciatica  TECHNOLOGIST PROVIDED HISTORY:  Reason for exam:->low back pain failed 12 weeks PT. OMT, heat. OTC meds  tylenol and ibuprofen  What is the sedation

## 2025-06-05 ENCOUNTER — OFFICE VISIT (OUTPATIENT)
Dept: ORTHOPEDIC SURGERY | Age: 80
End: 2025-06-05

## 2025-06-05 VITALS
HEART RATE: 73 BPM | DIASTOLIC BLOOD PRESSURE: 81 MMHG | TEMPERATURE: 97.5 F | RESPIRATION RATE: 12 BRPM | SYSTOLIC BLOOD PRESSURE: 169 MMHG | OXYGEN SATURATION: 98 %

## 2025-06-05 DIAGNOSIS — M25.562 PAIN IN BOTH KNEES, UNSPECIFIED CHRONICITY: Primary | ICD-10-CM

## 2025-06-05 DIAGNOSIS — M17.0 BILATERAL PRIMARY OSTEOARTHRITIS OF KNEE: ICD-10-CM

## 2025-06-05 DIAGNOSIS — M25.561 PAIN IN BOTH KNEES, UNSPECIFIED CHRONICITY: Primary | ICD-10-CM

## 2025-06-05 RX ORDER — BUPIVACAINE HYDROCHLORIDE 2.5 MG/ML
3 INJECTION, SOLUTION INFILTRATION; PERINEURAL ONCE
Status: COMPLETED | OUTPATIENT
Start: 2025-06-05 | End: 2025-06-05

## 2025-06-05 RX ORDER — TRIAMCINOLONE ACETONIDE 40 MG/ML
80 INJECTION, SUSPENSION INTRA-ARTICULAR; INTRAMUSCULAR ONCE
Status: COMPLETED | OUTPATIENT
Start: 2025-06-05 | End: 2025-06-05

## 2025-06-05 RX ADMIN — TRIAMCINOLONE ACETONIDE 80 MG: 40 INJECTION, SUSPENSION INTRA-ARTICULAR; INTRAMUSCULAR at 10:59

## 2025-06-05 RX ADMIN — BUPIVACAINE HYDROCHLORIDE 7.5 MG: 2.5 INJECTION, SOLUTION INFILTRATION; PERINEURAL at 10:58

## 2025-06-05 NOTE — PROGRESS NOTES
New Patient     Referring Provider:   No referring provider defined for this encounter.    CHIEF COMPLAINT:   Chief Complaint   Patient presents with    Knee Pain     Bilateral knee pain, L>R        HPI:    History of Present Illness  The patient presents for evaluation of bilateral knee pain.    She reports experiencing bilateral knee pain, with the right knee being more symptomatic than the left. The pain is constant, even at night, and is accompanied by swelling in the entire leg. She has been under the care of Dr. Montgomery, who initially suspected a blood clot and ordered an ultrasound, but the results were negative. Despite this, the pain persists. She has received injections from Dr. Matta, with the most recent one administered in 12/2024, which provided some relief. Additionally, she mentions a history of a Baker's cyst in her left knee.    PAST SURGICAL HISTORY:  She reports a history of a broken ankle treated by Dr. Matta years ago.       PAST MEDICAL HISTORY  Past Medical History:   Diagnosis Date    Arthritis     Babesiosis 2009    tick disease; treated    Back pain     Carotid artery stenosis     Endometrial cancer (HCC) 11/21 2019 approx    new dx    Hearing loss     bilat hearing aids    History of blood transfusion     Hyperlipidemia     Hypertension     GENNY on CPAP        PAST SURGICAL HISTORY  Past Surgical History:   Procedure Laterality Date    BACK INJECTION Bilateral 1/31/2025    BILATERAL SACROILIAC JOINT INJECTION UNDER FLUOROSCOPIC GUIDANCE performed by Leann Guerra MD at [facility] OR    BUNIONECTOMY  2006 2007    bilateral    CARPAL TUNNEL RELEASE Bilateral     COLONOSCOPY      COLONOSCOPY N/A 12/04/2019    COLONOSCOPY WITH BIOPSY performed by Roseline Arenas MD at [facility] ENDOSCOPY    COLONOSCOPY N/A 11/20/2024    COLONOSCOPY DIAGNOSTIC performed by Roseline Arenas MD at [facility] ENDOSCOPY    CYST REMOVAL  2010    right hand    FINGER TRIGGER RELEASE Left 12/18/2018    left

## 2025-06-23 ENCOUNTER — OFFICE VISIT (OUTPATIENT)
Dept: FAMILY MEDICINE CLINIC | Age: 80
End: 2025-06-23
Payer: MEDICARE

## 2025-06-23 ENCOUNTER — RESULTS FOLLOW-UP (OUTPATIENT)
Dept: FAMILY MEDICINE CLINIC | Age: 80
End: 2025-06-23

## 2025-06-23 VITALS
OXYGEN SATURATION: 98 % | HEIGHT: 59 IN | TEMPERATURE: 97.5 F | DIASTOLIC BLOOD PRESSURE: 45 MMHG | BODY MASS INDEX: 24.19 KG/M2 | RESPIRATION RATE: 17 BRPM | SYSTOLIC BLOOD PRESSURE: 145 MMHG | WEIGHT: 120 LBS | HEART RATE: 72 BPM

## 2025-06-23 DIAGNOSIS — I65.21 STENOSIS OF RIGHT CAROTID ARTERY: ICD-10-CM

## 2025-06-23 DIAGNOSIS — Z85.42 HISTORY OF UTERINE CANCER: ICD-10-CM

## 2025-06-23 DIAGNOSIS — R73.09 ELEVATED HEMOGLOBIN A1C: ICD-10-CM

## 2025-06-23 DIAGNOSIS — I65.23 BILATERAL CAROTID ARTERY STENOSIS: Primary | ICD-10-CM

## 2025-06-23 DIAGNOSIS — R06.02 SHORTNESS OF BREATH: ICD-10-CM

## 2025-06-23 DIAGNOSIS — I10 WHITE COAT SYNDROME WITH DIAGNOSIS OF HYPERTENSION: ICD-10-CM

## 2025-06-23 DIAGNOSIS — R10.30 LOWER ABDOMINAL PAIN: Primary | ICD-10-CM

## 2025-06-23 DIAGNOSIS — I10 PRIMARY HYPERTENSION: ICD-10-CM

## 2025-06-23 LAB — HBA1C MFR BLD: 5.7 %

## 2025-06-23 PROCEDURE — 1160F RVW MEDS BY RX/DR IN RCRD: CPT | Performed by: STUDENT IN AN ORGANIZED HEALTH CARE EDUCATION/TRAINING PROGRAM

## 2025-06-23 PROCEDURE — 3078F DIAST BP <80 MM HG: CPT | Performed by: STUDENT IN AN ORGANIZED HEALTH CARE EDUCATION/TRAINING PROGRAM

## 2025-06-23 PROCEDURE — 1123F ACP DISCUSS/DSCN MKR DOCD: CPT | Performed by: STUDENT IN AN ORGANIZED HEALTH CARE EDUCATION/TRAINING PROGRAM

## 2025-06-23 PROCEDURE — 83036 HEMOGLOBIN GLYCOSYLATED A1C: CPT | Performed by: STUDENT IN AN ORGANIZED HEALTH CARE EDUCATION/TRAINING PROGRAM

## 2025-06-23 PROCEDURE — 3077F SYST BP >= 140 MM HG: CPT | Performed by: STUDENT IN AN ORGANIZED HEALTH CARE EDUCATION/TRAINING PROGRAM

## 2025-06-23 PROCEDURE — 1159F MED LIST DOCD IN RCRD: CPT | Performed by: STUDENT IN AN ORGANIZED HEALTH CARE EDUCATION/TRAINING PROGRAM

## 2025-06-23 PROCEDURE — 99214 OFFICE O/P EST MOD 30 MIN: CPT | Performed by: STUDENT IN AN ORGANIZED HEALTH CARE EDUCATION/TRAINING PROGRAM

## 2025-06-23 PROCEDURE — G2211 COMPLEX E/M VISIT ADD ON: HCPCS | Performed by: STUDENT IN AN ORGANIZED HEALTH CARE EDUCATION/TRAINING PROGRAM

## 2025-06-23 RX ORDER — VALSARTAN 40 MG/1
40 TABLET ORAL DAILY
Qty: 90 TABLET | Refills: 3 | Status: SHIPPED | OUTPATIENT
Start: 2025-06-23

## 2025-06-23 ASSESSMENT — ENCOUNTER SYMPTOMS
ABDOMINAL DISTENTION: 0
ABDOMINAL PAIN: 1
SHORTNESS OF BREATH: 1
COUGH: 0
WHEEZING: 0

## 2025-06-23 NOTE — PROGRESS NOTES
Roopa Couch (:  1945) is a 79 y.o. female, established patient follow up , here for evaluation of the following:  Hypertension     The patient (or guardian, if applicable) and other individuals in attendance with the patient were advised that Artificial Intelligence will be utilized during this visit to record, process the conversation to generate a clinical note, and support improvement of the AI technology. The patient (or guardian, if applicable) and other individuals in attendance at the appointment consented to the use of AI, including the recording.        Assessment & Plan  1. Hypertension: Chronic.  - Blood pressure readings have been inconsistent, with systolic values ranging from 169/81 to 174/70.   - Large difference between diastolic and systolic, possible aortic regurg, will get echo especially with her shortness of breath  - Resume valsartan at a reduced dose of 40 mg.  - Order echocardiogram and vascular ultrasound to assess cardiac function.    2. Leg pain.  - Resume valsartan at a reduced dose of 40 mg.  - Increase water intake to at least 55 ounces daily.  - Engage in stretching exercises for legs and back for at least 10 minutes before bedtime.  - Core strengthening and back strengthening exercises recommended every three days.  - Explore yoga or other exercises specifically designed for restless legs and leg cramps.    3. Visual disturbances.  - Consult with ophthalmologist to investigate the cause of visual disturbances.  Possibly peripheral vision is decreasing     4. Lower abdominal cramping.  - CT scan of the abdomen with contrast will be ordered to rule out any underlying conditions.  History of uterine cancer, does have follow-up with oncology and gynecology however not for several months  - Abdominal x-ray will be performed today to check for stool accumulation.  She will stop supplement    Follow-up  - Scheduled for a follow-up visit on 2025.  Declined preventative

## 2025-07-08 PROBLEM — L82.0 INFLAMED SEBORRHEIC KERATOSIS: Status: ACTIVE | Noted: 2024-04-11

## 2025-07-08 PROBLEM — L72.0 EPIDERMOID CYST OF SKIN: Status: ACTIVE | Noted: 2024-04-11

## 2025-07-08 PROBLEM — C55 MALIGNANT NEOPLASM OF UTERUS (HCC): Status: ACTIVE | Noted: 2024-04-11

## 2025-07-08 PROBLEM — D22.61 MELANOCYTIC NEVUS OF RIGHT UPPER EXTREMITY: Status: ACTIVE | Noted: 2024-04-11

## 2025-07-08 PROBLEM — L57.9 SKIN CHANGES DUE TO CHRONIC EXPOSURE TO NONIONIZING RADIATION: Status: ACTIVE | Noted: 2024-04-11

## 2025-07-08 PROBLEM — L81.9 DISORDER OF PIGMENTATION: Status: ACTIVE | Noted: 2024-04-11

## 2025-07-08 PROBLEM — M19.90 ARTHRITIS: Status: ACTIVE | Noted: 2024-01-24

## 2025-07-08 PROBLEM — D18.01 HEMANGIOMA OF SKIN AND SUBCUTANEOUS TISSUE: Status: ACTIVE | Noted: 2025-06-17

## 2025-07-08 PROBLEM — D22.9 MELANOCYTIC NEVUS: Status: ACTIVE | Noted: 2025-06-17

## 2025-07-08 PROBLEM — Z86.79 HISTORY OF HYPERTENSION: Status: ACTIVE | Noted: 2025-06-17

## 2025-07-08 PROBLEM — L57.0 ACTINIC KERATOSIS: Status: ACTIVE | Noted: 2024-04-11

## 2025-07-08 PROBLEM — H91.90 HEARING LOSS: Status: ACTIVE | Noted: 2024-04-11

## 2025-07-08 PROBLEM — E78.00 HYPERCHOLESTEROLEMIA: Status: ACTIVE | Noted: 2024-04-11

## 2025-07-10 ENCOUNTER — PATIENT MESSAGE (OUTPATIENT)
Dept: FAMILY MEDICINE CLINIC | Age: 80
End: 2025-07-10

## 2025-07-11 PROBLEM — C55 MALIGNANT NEOPLASM OF UTERUS (HCC): Status: RESOLVED | Noted: 2024-04-11 | Resolved: 2025-07-11

## 2025-07-14 ENCOUNTER — HOSPITAL ENCOUNTER (OUTPATIENT)
Dept: CARDIOLOGY | Age: 80
Discharge: HOME | End: 2025-07-16
Payer: MEDICARE

## 2025-07-14 VITALS
HEIGHT: 59 IN | DIASTOLIC BLOOD PRESSURE: 68 MMHG | SYSTOLIC BLOOD PRESSURE: 132 MMHG | WEIGHT: 118 LBS | BODY MASS INDEX: 23.79 KG/M2

## 2025-07-14 DIAGNOSIS — R06.02 SHORTNESS OF BREATH: ICD-10-CM

## 2025-07-14 DIAGNOSIS — I10 PRIMARY HYPERTENSION: ICD-10-CM

## 2025-07-14 DIAGNOSIS — I10 WHITE COAT SYNDROME WITH DIAGNOSIS OF HYPERTENSION: ICD-10-CM

## 2025-07-14 PROCEDURE — 93306 TTE W/DOPPLER COMPLETE: CPT

## 2025-07-16 ENCOUNTER — HOSPITAL ENCOUNTER (OUTPATIENT)
Age: 80
Discharge: HOME | End: 2025-07-16
Payer: MEDICARE

## 2025-07-16 DIAGNOSIS — I10 PRIMARY HYPERTENSION: ICD-10-CM

## 2025-07-16 DIAGNOSIS — Z85.42 HISTORY OF UTERINE CANCER: ICD-10-CM

## 2025-07-16 DIAGNOSIS — R10.30 LOWER ABDOMINAL PAIN: ICD-10-CM

## 2025-07-16 LAB
ALBUMIN SERPL-MCNC: 4.4 G/DL (ref 3.5–5.2)
ALP SERPL-CCNC: 73 U/L (ref 35–104)
ALT SERPL-CCNC: 18 U/L (ref 0–35)
ANION GAP SERPL CALCULATED.3IONS-SCNC: 8 MMOL/L (ref 7–16)
AST SERPL-CCNC: 23 U/L (ref 0–35)
BILIRUB SERPL-MCNC: 0.2 MG/DL (ref 0–1.2)
BUN SERPL-MCNC: 16 MG/DL (ref 8–23)
CALCIUM SERPL-MCNC: 9.2 MG/DL (ref 8.8–10.2)
CHLORIDE SERPL-SCNC: 100 MMOL/L (ref 98–107)
CO2 SERPL-SCNC: 25 MMOL/L (ref 22–29)
CREAT SERPL-MCNC: 0.9 MG/DL (ref 0.5–1)
GFR, ESTIMATED: 66 ML/MIN/1.73M2
GLUCOSE SERPL-MCNC: 93 MG/DL (ref 74–99)
POTASSIUM SERPL-SCNC: 4.4 MMOL/L (ref 3.5–5.1)
PROT SERPL-MCNC: 6.6 G/DL (ref 6.4–8.3)
SODIUM SERPL-SCNC: 134 MMOL/L (ref 136–145)

## 2025-07-16 PROCEDURE — 36415 COLL VENOUS BLD VENIPUNCTURE: CPT

## 2025-07-16 PROCEDURE — 80053 COMPREHEN METABOLIC PANEL: CPT

## 2025-07-18 LAB
ECHO AO ASC DIAM: 2.3 CM
ECHO AO ASCENDING AORTA INDEX: 1.56 CM/M2
ECHO AV AREA PEAK VELOCITY: 1.9 CM2
ECHO AV AREA VTI: 2.3 CM2
ECHO AV AREA/BSA PEAK VELOCITY: 1.3 CM2/M2
ECHO AV AREA/BSA VTI: 1.6 CM2/M2
ECHO AV CUSP MM: 1.6 CM
ECHO AV MEAN GRADIENT: 3 MMHG
ECHO AV MEAN VELOCITY: 0.8 M/S
ECHO AV PEAK GRADIENT: 7 MMHG
ECHO AV PEAK VELOCITY: 1.3 M/S
ECHO AV VELOCITY RATIO: 0.69
ECHO AV VTI: 28.8 CM
ECHO BSA: 1.49 M2
ECHO EST RA PRESSURE: 3 MMHG
ECHO LA DIAMETER INDEX: 1.97 CM/M2
ECHO LA DIAMETER: 2.9 CM
ECHO LA VOL A-L A2C: 22 ML (ref 22–52)
ECHO LA VOL A-L A4C: 41 ML (ref 22–52)
ECHO LA VOL MOD A2C: 21 ML (ref 22–52)
ECHO LA VOL MOD A4C: 38 ML (ref 22–52)
ECHO LA VOLUME AREA LENGTH: 31 ML
ECHO LA VOLUME INDEX A-L A2C: 15 ML/M2 (ref 16–34)
ECHO LA VOLUME INDEX A-L A4C: 28 ML/M2 (ref 16–34)
ECHO LA VOLUME INDEX AREA LENGTH: 21 ML/M2 (ref 16–34)
ECHO LA VOLUME INDEX MOD A2C: 14 ML/M2 (ref 16–34)
ECHO LA VOLUME INDEX MOD A4C: 26 ML/M2 (ref 16–34)
ECHO LV EF PHYSICIAN: 60 %
ECHO LV FRACTIONAL SHORTENING: 26 % (ref 28–44)
ECHO LV INTERNAL DIMENSION DIASTOLE INDEX: 2.31 CM/M2
ECHO LV INTERNAL DIMENSION DIASTOLIC: 3.4 CM (ref 3.9–5.3)
ECHO LV INTERNAL DIMENSION SYSTOLIC INDEX: 1.7 CM/M2
ECHO LV INTERNAL DIMENSION SYSTOLIC: 2.5 CM
ECHO LV ISOVOLUMETRIC RELAXATION TIME (IVRT): 87.7 MS
ECHO LV IVSD: 0.9 CM (ref 0.6–0.9)
ECHO LV IVSS: 1.1 CM
ECHO LV MASS 2D: 84.9 G (ref 67–162)
ECHO LV MASS INDEX 2D: 57.7 G/M2 (ref 43–95)
ECHO LV POSTERIOR WALL DIASTOLIC: 0.9 CM (ref 0.6–0.9)
ECHO LV POSTERIOR WALL SYSTOLIC: 1.2 CM
ECHO LV RELATIVE WALL THICKNESS RATIO: 0.53
ECHO LVOT AREA: 2.8 CM2
ECHO LVOT AV VTI INDEX: 0.83
ECHO LVOT DIAM: 1.9 CM
ECHO LVOT MEAN GRADIENT: 2 MMHG
ECHO LVOT PEAK GRADIENT: 3 MMHG
ECHO LVOT PEAK VELOCITY: 0.9 M/S
ECHO LVOT STROKE VOLUME INDEX: 46.1 ML/M2
ECHO LVOT SV: 67.7 ML
ECHO LVOT VTI: 23.9 CM
ECHO MV "A" WAVE DURATION: 133.8 MSEC
ECHO MV A VELOCITY: 0.96 M/S
ECHO MV AREA PHT: 2.8 CM2
ECHO MV AREA VTI: 1.9 CM2
ECHO MV E DECELERATION TIME (DT): 190 MS
ECHO MV E VELOCITY: 0.92 M/S
ECHO MV E/A RATIO: 0.96
ECHO MV LVOT VTI INDEX: 1.46
ECHO MV MAX VELOCITY: 1.2 M/S
ECHO MV MEAN GRADIENT: 2 MMHG
ECHO MV MEAN VELOCITY: 0.6 M/S
ECHO MV PEAK GRADIENT: 5 MMHG
ECHO MV PRESSURE HALF TIME (PHT): 77.4 MS
ECHO MV REGURGITANT VELOCITY PISA: 6.5 M/S
ECHO MV REGURGITANT VTIA: 220.2 CM
ECHO MV VTI: 34.8 CM
ECHO PULMONARY ARTERY END DIASTOLIC PRESSURE: 2 MMHG
ECHO PV MAX VELOCITY: 0.8 M/S
ECHO PV MEAN GRADIENT: 2 MMHG
ECHO PV MEAN VELOCITY: 0.6 M/S
ECHO PV PEAK GRADIENT: 3 MMHG
ECHO PV REGURGITANT MAX VELOCITY: 0.7 M/S
ECHO PV VTI: 20.9 CM
ECHO PVEIN A DURATION: 124.6 MS
ECHO PVEIN A VELOCITY: 0.3 M/S
ECHO PVEIN PEAK D VELOCITY: 0.5 M/S
ECHO PVEIN PEAK S VELOCITY: 0.8 M/S
ECHO PVEIN S/D RATIO: 1.6
ECHO RIGHT VENTRICULAR SYSTOLIC PRESSURE (RVSP): 26 MMHG
ECHO RV INTERNAL DIMENSION: 2.8 CM
ECHO RV TAPSE: 3.1 CM (ref 1.7–?)
ECHO TV REGURGITANT MAX VELOCITY: 2.39 M/S
ECHO TV REGURGITANT PEAK GRADIENT: 23 MMHG

## 2025-07-19 PROBLEM — I34.0 NONRHEUMATIC MITRAL VALVE REGURGITATION: Status: ACTIVE | Noted: 2025-07-19

## 2025-07-24 ENCOUNTER — HOSPITAL ENCOUNTER (OUTPATIENT)
Dept: ULTRASOUND IMAGING | Age: 80
Discharge: HOME | End: 2025-07-26
Payer: MEDICARE

## 2025-07-24 ENCOUNTER — HOSPITAL ENCOUNTER (OUTPATIENT)
Dept: CT IMAGING | Age: 80
Discharge: HOME | End: 2025-07-26
Payer: MEDICARE

## 2025-07-24 ENCOUNTER — APPOINTMENT (OUTPATIENT)
Dept: CT IMAGING | Age: 80
End: 2025-07-24
Payer: MEDICARE

## 2025-07-24 ENCOUNTER — HOSPITAL ENCOUNTER (OUTPATIENT)
Age: 80
Discharge: HOME | End: 2025-07-26
Payer: MEDICARE

## 2025-07-24 DIAGNOSIS — Z85.42 HISTORY OF ENDOMETRIAL CANCER: ICD-10-CM

## 2025-07-24 DIAGNOSIS — Z85.42 HISTORY OF UTERINE CANCER: ICD-10-CM

## 2025-07-24 DIAGNOSIS — R10.30 LOWER ABDOMINAL PAIN: ICD-10-CM

## 2025-07-24 DIAGNOSIS — R91.8 LUNG NODULES: ICD-10-CM

## 2025-07-24 DIAGNOSIS — I65.21 STENOSIS OF RIGHT CAROTID ARTERY: ICD-10-CM

## 2025-07-24 PROCEDURE — 6360000004 HC RX CONTRAST MEDICATION: Performed by: RADIOLOGY

## 2025-07-24 PROCEDURE — 93880 EXTRACRANIAL BILAT STUDY: CPT

## 2025-07-24 PROCEDURE — 71260 CT THORAX DX C+: CPT

## 2025-07-24 PROCEDURE — 74177 CT ABD & PELVIS W/CONTRAST: CPT

## 2025-07-24 RX ORDER — IOPAMIDOL 755 MG/ML
75 INJECTION, SOLUTION INTRAVASCULAR
Status: COMPLETED | OUTPATIENT
Start: 2025-07-24 | End: 2025-07-24

## 2025-07-24 RX ADMIN — IOPAMIDOL 75 ML: 755 INJECTION, SOLUTION INTRAVENOUS at 08:29

## 2025-07-25 ENCOUNTER — TELEPHONE (OUTPATIENT)
Dept: VASCULAR SURGERY | Age: 80
End: 2025-07-25

## 2025-07-25 RX ORDER — ROSUVASTATIN CALCIUM 10 MG/1
10 TABLET, COATED ORAL DAILY
Qty: 90 TABLET | Refills: 1 | Status: SHIPPED | OUTPATIENT
Start: 2025-07-25

## 2025-07-25 NOTE — TELEPHONE ENCOUNTER
Received referral from Dr. Wade for carotid stenosis, left message for patient to make appointment to see Dr. Turk.

## 2025-07-25 NOTE — TELEPHONE ENCOUNTER
1. Bilateral carotid artery stenosis  -     [facility]  -     rosuvastatin (CRESTOR) 10 MG tablet; Take 1 tablet by mouth daily, Disp-90 tablet, R-1Normal

## 2025-07-28 DIAGNOSIS — I65.23 BILATERAL CAROTID ARTERY STENOSIS: Primary | ICD-10-CM

## 2025-08-26 ENCOUNTER — OFFICE VISIT (OUTPATIENT)
Dept: VASCULAR SURGERY | Age: 80
End: 2025-08-26
Payer: MEDICARE

## 2025-08-26 VITALS
DIASTOLIC BLOOD PRESSURE: 62 MMHG | BODY MASS INDEX: 22.78 KG/M2 | HEIGHT: 60 IN | SYSTOLIC BLOOD PRESSURE: 124 MMHG | WEIGHT: 116 LBS | HEART RATE: 74 BPM

## 2025-08-26 DIAGNOSIS — I65.23 CAROTID ARTERY STENOSIS, ASYMPTOMATIC, BILATERAL: Primary | ICD-10-CM

## 2025-08-26 PROCEDURE — 1123F ACP DISCUSS/DSCN MKR DOCD: CPT | Performed by: NURSE PRACTITIONER

## 2025-08-26 PROCEDURE — 3078F DIAST BP <80 MM HG: CPT | Performed by: NURSE PRACTITIONER

## 2025-08-26 PROCEDURE — 99203 OFFICE O/P NEW LOW 30 MIN: CPT | Performed by: NURSE PRACTITIONER

## 2025-08-26 PROCEDURE — 1159F MED LIST DOCD IN RCRD: CPT | Performed by: NURSE PRACTITIONER

## 2025-08-26 PROCEDURE — 3074F SYST BP LT 130 MM HG: CPT | Performed by: NURSE PRACTITIONER

## (undated) DEVICE — PACK PROCEDURE SURG GEN CUST

## (undated) DEVICE — ELECTRODE PT RET AD L9FT HI MOIST COND ADH HYDRGEL CORDED

## (undated) DEVICE — 3M™ RED DOT™ MONITORING ELECTRODE WITH FOAM TAPE AND STICKY GEL 2560, 50/BAG, 20/CASE, 72/PLT: Brand: RED DOT™

## (undated) DEVICE — NEEDLE HYPO 18GA L1.5IN PNK POLYPR HUB S STL THN WALL FILL

## (undated) DEVICE — Device: Brand: PORTEX

## (undated) DEVICE — NON-DEHP CATHETER EXTENSION SET, MALE LUER LOCK ADAPTER

## (undated) DEVICE — GAUZE,SPONGE,4"X4",8PLY,STRL,LF,10/TRAY: Brand: MEDLINE

## (undated) DEVICE — TOWEL,OR,DSP,ST,BLUE,STD,6/PK,12PK/CS: Brand: MEDLINE

## (undated) DEVICE — HYPODERMIC SAFETY NEEDLE: Brand: MAGELLAN

## (undated) DEVICE — CHLORAPREP 26ML ORANGE

## (undated) DEVICE — SYRINGE MED 5ML STD CLR PLAS LUERLOCK TIP N CTRL DISP

## (undated) DEVICE — BANDAGE ADH W1XL3IN NAT FAB WVN FLX DURABLE N ADH PD SEAL

## (undated) DEVICE — SYRINGE MED 10ML POLYPR LUERSLIP TIP FLAT TOP W/O SFTY DISP

## (undated) DEVICE — GRADUATE TRIANG MEASURE 1000ML BLK PRNT

## (undated) DEVICE — SYRINGE, LUER LOCK, 5ML: Brand: MEDLINE

## (undated) DEVICE — GAUZE,SPONGE,4"X4",12PLY,STERILE,LF,2'S: Brand: MEDLINE

## (undated) DEVICE — DOUBLE BASIN SET: Brand: MEDLINE INDUSTRIES, INC.

## (undated) DEVICE — DRAPE C ARM W41XL74IN UNIV MOB W RUBBERBAND CLP

## (undated) DEVICE — 6 ML SYRINGE LUER-LOCK TIP: Brand: MONOJECT

## (undated) DEVICE — 12 ML SYRINGE,LUER-LOCK TIP: Brand: MONOJECT

## (undated) DEVICE — GLOVE ORANGE PI 7 1/2   MSG9075

## (undated) DEVICE — NEEDLE HYPO 25GA L1.5IN BLU POLYPR HUB S STL REG BVL STR

## (undated) DEVICE — SYRINGE MED 10ML TRNSLUC BRL PLUNG BLK MRK POLYPR CTRL

## (undated) DEVICE — SPONGE GZ W4XL4IN RAYON POLY CVR W/NONWOVEN FAB STRL 2/PK

## (undated) DEVICE — NEEDLE HYPO 18GA L1.5IN PNK POLYPR HUB S STL REG BVL STR

## (undated) DEVICE — INTENDED FOR TISSUE SEPARATION, AND OTHER PROCEDURES THAT REQUIRE A SHARP SURGICAL BLADE TO PUNCTURE OR CUT.: Brand: BARD-PARKER ® STAINLESS STEEL BLADES

## (undated) DEVICE — FORCEPS BX L240CM JAW DIA2.4MM ORNG L CAP W/ NDL DISP RAD

## (undated) DEVICE — Z DUP USE 2257490 ADHESIVE SKIN CLSRE 036ML TPCL 2CTL CNCRLTE HIGH VSCSTY DRMB

## (undated) DEVICE — GOWN,SIRUS,FABRNF,XL,20/CS: Brand: MEDLINE

## (undated) DEVICE — DRAPE,CHEST,FENES,15X10,STERIL: Brand: MEDLINE